# Patient Record
Sex: FEMALE | Race: BLACK OR AFRICAN AMERICAN | NOT HISPANIC OR LATINO
[De-identification: names, ages, dates, MRNs, and addresses within clinical notes are randomized per-mention and may not be internally consistent; named-entity substitution may affect disease eponyms.]

---

## 2017-04-14 ENCOUNTER — APPOINTMENT (OUTPATIENT)
Dept: INTERNAL MEDICINE | Facility: CLINIC | Age: 42
End: 2017-04-14

## 2017-07-25 ENCOUNTER — APPOINTMENT (OUTPATIENT)
Dept: INTERNAL MEDICINE | Facility: CLINIC | Age: 42
End: 2017-07-25

## 2017-08-14 ENCOUNTER — APPOINTMENT (OUTPATIENT)
Dept: INTERNAL MEDICINE | Facility: CLINIC | Age: 42
End: 2017-08-14
Payer: MEDICAID

## 2017-08-14 PROCEDURE — 86580 TB INTRADERMAL TEST: CPT

## 2018-02-26 ENCOUNTER — APPOINTMENT (OUTPATIENT)
Dept: INTERNAL MEDICINE | Facility: CLINIC | Age: 43
End: 2018-02-26
Payer: MEDICAID

## 2018-02-26 PROCEDURE — 99213 OFFICE O/P EST LOW 20 MIN: CPT | Mod: 25

## 2018-02-26 PROCEDURE — 36415 COLL VENOUS BLD VENIPUNCTURE: CPT

## 2018-04-27 ENCOUNTER — APPOINTMENT (OUTPATIENT)
Dept: INTERNAL MEDICINE | Facility: CLINIC | Age: 43
End: 2018-04-27

## 2018-05-10 ENCOUNTER — APPOINTMENT (OUTPATIENT)
Dept: INTERNAL MEDICINE | Facility: CLINIC | Age: 43
End: 2018-05-10
Payer: MEDICAID

## 2018-05-10 PROCEDURE — 99214 OFFICE O/P EST MOD 30 MIN: CPT

## 2018-07-02 ENCOUNTER — APPOINTMENT (OUTPATIENT)
Dept: BARIATRICS | Facility: CLINIC | Age: 43
End: 2018-07-02
Payer: MEDICAID

## 2018-07-02 VITALS
WEIGHT: 242.5 LBS | DIASTOLIC BLOOD PRESSURE: 80 MMHG | HEIGHT: 68 IN | SYSTOLIC BLOOD PRESSURE: 120 MMHG | HEART RATE: 82 BPM | OXYGEN SATURATION: 98 % | BODY MASS INDEX: 36.75 KG/M2

## 2018-07-02 DIAGNOSIS — Z01.818 ENCOUNTER FOR OTHER PREPROCEDURAL EXAMINATION: ICD-10-CM

## 2018-07-02 DIAGNOSIS — Z86.39 PERSONAL HISTORY OF OTHER ENDOCRINE, NUTRITIONAL AND METABOLIC DISEASE: ICD-10-CM

## 2018-07-02 PROCEDURE — 99215 OFFICE O/P EST HI 40 MIN: CPT

## 2018-07-06 ENCOUNTER — RECORD ABSTRACTING (OUTPATIENT)
Age: 43
End: 2018-07-06

## 2018-07-06 DIAGNOSIS — G89.29 PAIN IN RIGHT SHOULDER: ICD-10-CM

## 2018-07-06 DIAGNOSIS — M54.5 LOW BACK PAIN: ICD-10-CM

## 2018-07-06 DIAGNOSIS — M25.511 PAIN IN RIGHT SHOULDER: ICD-10-CM

## 2018-07-06 DIAGNOSIS — M79.605 LOW BACK PAIN: ICD-10-CM

## 2018-07-06 DIAGNOSIS — Z82.49 FAMILY HISTORY OF ISCHEMIC HEART DISEASE AND OTHER DISEASES OF THE CIRCULATORY SYSTEM: ICD-10-CM

## 2018-07-10 ENCOUNTER — APPOINTMENT (OUTPATIENT)
Dept: INTERNAL MEDICINE | Facility: CLINIC | Age: 43
End: 2018-07-10
Payer: MEDICAID

## 2018-07-10 VITALS
DIASTOLIC BLOOD PRESSURE: 78 MMHG | HEIGHT: 68 IN | SYSTOLIC BLOOD PRESSURE: 120 MMHG | BODY MASS INDEX: 36.75 KG/M2 | WEIGHT: 242.5 LBS
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
## 2018-07-10 DIAGNOSIS — S93.601A UNSPECIFIED SPRAIN OF RIGHT FOOT, INITIAL ENCOUNTER: ICD-10-CM

## 2018-07-10 DIAGNOSIS — Z92.89 PERSONAL HISTORY OF OTHER MEDICAL TREATMENT: ICD-10-CM

## 2018-07-10 PROCEDURE — 99214 OFFICE O/P EST MOD 30 MIN: CPT

## 2018-07-10 RX ORDER — BENAZEPRIL HYDROCHLORIDE AND HYDROCHLOROTHIAZIDE 20; 12.5 MG/1; MG/1
20-12.5 TABLET, FILM COATED ORAL DAILY
Refills: 0 | Status: DISCONTINUED | COMMUNITY
End: 2018-07-10

## 2018-07-10 RX ORDER — FLUCONAZOLE 100 MG/1
100 TABLET ORAL
Qty: 5 | Refills: 0 | Status: DISCONTINUED | COMMUNITY
Start: 2018-03-23

## 2018-07-10 RX ORDER — BENZONATATE 200 MG/1
200 CAPSULE ORAL
Qty: 20 | Refills: 0 | Status: DISCONTINUED | COMMUNITY
Start: 2018-05-29

## 2018-07-10 RX ORDER — AZITHROMYCIN 250 MG/1
250 TABLET, FILM COATED ORAL
Qty: 6 | Refills: 0 | Status: DISCONTINUED | COMMUNITY
Start: 2018-05-29

## 2018-07-10 NOTE — HISTORY OF PRESENT ILLNESS
[FreeTextEntry1] : Routine follow up  [de-identified] : 43-year-old woman with hypertension is here for followup. She is complaining of a small mass on right shin.No pain\par Blood pressure is normal. She used to be on Benzapril hydrochlorothiazide but stopped taking Benzapril due to numbness in hands and legs.Since stopping medication feels much better.instead she increased her hydrochlorothiazide to one and a half tablet every day.

## 2018-07-10 NOTE — PLAN
[FreeTextEntry1] : For hypertension Will continue hydrochlorothiazide 37.5 mg. diets and weight loss discussed.\par CBC from February 2018 reviewed with her normal anemia.\par For lipoma will refer her to surgery.

## 2018-07-10 NOTE — PHYSICAL EXAM

## 2018-07-23 PROBLEM — S93.601A RIGHT FOOT SPRAIN: Status: RESOLVED | Noted: 2018-07-06 | Resolved: 2018-07-23
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
## 2018-07-23 PROBLEM — Z92.89 HISTORY OF EKG: Status: RESOLVED | Noted: 2018-07-23 | Resolved: 2018-07-23

## 2018-07-31 ENCOUNTER — APPOINTMENT (OUTPATIENT)
Dept: INTERNAL MEDICINE | Facility: CLINIC | Age: 43
End: 2018-07-31
Payer: MEDICAID

## 2018-07-31 VITALS
SYSTOLIC BLOOD PRESSURE: 120 MMHG | DIASTOLIC BLOOD PRESSURE: 80 MMHG | WEIGHT: 244 LBS | HEIGHT: 68 IN | BODY MASS INDEX: 36.98 KG/M2

## 2018-07-31 PROCEDURE — 99213 OFFICE O/P EST LOW 20 MIN: CPT

## 2018-07-31 RX ORDER — BENAZEPRIL HYDROCHLORIDE AND HYDROCHLOROTHIAZIDE 20; 12.5 MG/1; MG/1
20-12.5 TABLET, FILM COATED ORAL DAILY
Refills: 0 | Status: DISCONTINUED | COMMUNITY
End: 2018-07-31

## 2018-07-31 NOTE — HISTORY OF PRESENT ILLNESS
[No Pertinent Cardiac History] : no history of aortic stenosis, atrial fibrillation, coronary artery disease, recent myocardial infarction, or implantable device/pacemaker [No Pertinent Pulmonary History] : no history of asthma, COPD, sleep apnea, or smoking [No Adverse Anesthesia Reaction] : no adverse anesthesia reaction in self or family member [Excellent (>10 METs)] : Excellent (>10 METs) [Chronic Anticoagulation] : no chronic anticoagulation [Chronic Kidney Disease] : no chronic kidney disease [Diabetes] : no diabetes [FreeTextEntry1] : removal of mass in right lower leg [FreeTextEntry2] : 08/03/2020 [FreeTextEntry3] : dr finkelstein [FreeTextEntry4] : Pre-op [FreeTextEntry6] : No chest pain, No SOB, good exercise tolerance  [FreeTextEntry7] : None needed

## 2018-07-31 NOTE — ASSESSMENT
[Patient Optimized for Surgery] : Patient optimized for surgery [No Further Testing Recommended] : no further testing recommended [Continue medications as is] : Continue current medications [As per surgery] : as per surgery [FreeTextEntry4] : She is in her optimized condition for the planned surgery. HTN is well controlled. for Low K will start supplements and high K containing food.  [FreeTextEntry2] : early ambulation recommended.

## 2018-07-31 NOTE — PLAN
[FreeTextEntry1] : -Will continue Meds Norvasc and HCTZ current doses. \par -Kcl 20meq daily \par -no ASA or NSIADs from today until after surgery. \par -

## 2018-07-31 NOTE — PHYSICAL EXAM

## 2018-08-13 ENCOUNTER — APPOINTMENT (OUTPATIENT)
Dept: INTERNAL MEDICINE | Facility: CLINIC | Age: 43
End: 2018-08-13
Payer: MEDICAID

## 2018-08-13 VITALS
HEIGHT: 66 IN | WEIGHT: 241 LBS | BODY MASS INDEX: 38.73 KG/M2 | SYSTOLIC BLOOD PRESSURE: 120 MMHG | DIASTOLIC BLOOD PRESSURE: 80 MMHG

## 2018-08-13 DIAGNOSIS — Z02.1 ENCOUNTER FOR PRE-EMPLOYMENT EXAMINATION: ICD-10-CM

## 2018-08-13 PROCEDURE — 99211 OFF/OP EST MAY X REQ PHY/QHP: CPT

## 2018-08-13 PROCEDURE — 86580 TB INTRADERMAL TEST: CPT

## 2018-08-15 ENCOUNTER — APPOINTMENT (OUTPATIENT)
Dept: INTERNAL MEDICINE | Facility: CLINIC | Age: 43
End: 2018-08-15
Payer: MEDICAID

## 2018-08-15 VITALS
BODY MASS INDEX: 38.41 KG/M2 | WEIGHT: 239 LBS | HEIGHT: 66 IN | DIASTOLIC BLOOD PRESSURE: 75 MMHG | SYSTOLIC BLOOD PRESSURE: 120 MMHG

## 2018-08-15 DIAGNOSIS — Z23 ENCOUNTER FOR IMMUNIZATION: ICD-10-CM

## 2018-08-15 PROCEDURE — 36415 COLL VENOUS BLD VENIPUNCTURE: CPT

## 2018-08-15 PROCEDURE — 90715 TDAP VACCINE 7 YRS/> IM: CPT

## 2018-08-15 PROCEDURE — 90472 IMMUNIZATION ADMIN EACH ADD: CPT

## 2018-08-15 PROCEDURE — 96372 THER/PROPH/DIAG INJ SC/IM: CPT

## 2018-08-15 PROCEDURE — 99396 PREV VISIT EST AGE 40-64: CPT | Mod: 25

## 2018-08-15 PROCEDURE — 90471 IMMUNIZATION ADMIN: CPT

## 2018-08-15 NOTE — PHYSICAL EXAM

## 2018-08-15 NOTE — PLAN
[FreeTextEntry1] : Patient is here for her annual exam  patient had PPD PLACED NOW NEG AT 48HRS\par TDAP GIVEN TO PT TODAY\par OVERALL FEELS WELL WILL CHECK LABS \par EKG DONE FOR PROP FOR LIPOMA WHICH I WELL HEALED

## 2018-08-15 NOTE — HISTORY OF PRESENT ILLNESS
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

## 2018-09-17 ENCOUNTER — APPOINTMENT (OUTPATIENT)
Dept: SURGERY | Facility: CLINIC | Age: 43
End: 2018-09-17

## 2018-09-28 ENCOUNTER — APPOINTMENT (OUTPATIENT)
Dept: INTERNAL MEDICINE | Facility: CLINIC | Age: 43
End: 2018-09-28
Payer: MEDICAID

## 2018-09-28 VITALS
SYSTOLIC BLOOD PRESSURE: 130 MMHG | DIASTOLIC BLOOD PRESSURE: 70 MMHG | BODY MASS INDEX: 38.41 KG/M2 | WEIGHT: 239 LBS | HEIGHT: 66 IN

## 2018-09-28 DIAGNOSIS — E87.6 HYPOKALEMIA: ICD-10-CM

## 2018-09-28 DIAGNOSIS — A09 INFECTIOUS GASTROENTERITIS AND COLITIS, UNSPECIFIED: ICD-10-CM

## 2018-09-28 DIAGNOSIS — K52.9 NONINFECTIVE GASTROENTERITIS AND COLITIS, UNSPECIFIED: ICD-10-CM

## 2018-09-28 PROCEDURE — 99213 OFFICE O/P EST LOW 20 MIN: CPT

## 2018-09-28 NOTE — PLAN
[FreeTextEntry1] : Patient signs and symptoms examination of her history of recent travel likely indicated patient is having gastroenteritis with possible traveler's diarrhea or possible food poisoning. In discussion with patient no antibiotics return at this time. We'll do stool culture ova parasites. Also the check comprehensive metabolic panel for evaluation of potassium levels and for signs of acute dehydration.\par \par Will discuss results with patient when they return\par \par Counseling included abnormal lab results, differential diagnoses, treatment options, risks and benefits, lifestyle changes, prognosis, current condition, medications, and dose adjustments. \par The patient was interactive, attentive, asked questions, and verbalized understanding

## 2018-09-28 NOTE — PHYSICAL EXAM
[No Acute Distress] : no acute distress [Well Nourished] : well nourished [Well Developed] : well developed [Well-Appearing] : well-appearing [Normal Sclera/Conjunctiva] : normal sclera/conjunctiva [PERRL] : pupils equal round and reactive to light [EOMI] : extraocular movements intact [Normal Outer Ear/Nose] : the outer ears and nose were normal in appearance [Normal Oropharynx] : the oropharynx was normal [No Respiratory Distress] : no respiratory distress  [Clear to Auscultation] : lungs were clear to auscultation bilaterally [No Accessory Muscle Use] : no accessory muscle use [Normal Rate] : normal rate  [Regular Rhythm] : with a regular rhythm [Normal S1, S2] : normal S1 and S2 [No Carotid Bruits] : no carotid bruits [No Abdominal Bruit] : a ~M bruit was not heard ~T in the abdomen [No Varicosities] : no varicosities [Soft] : abdomen soft [Non Tender] : non-tender [Non-distended] : non-distended [No CVA Tenderness] : no CVA  tenderness [No Spinal Tenderness] : no spinal tenderness [de-identified] : hyperactive bowel sounds

## 2018-10-03 ENCOUNTER — APPOINTMENT (OUTPATIENT)
Dept: INTERNAL MEDICINE | Facility: CLINIC | Age: 43
End: 2018-10-03
Payer: MEDICAID

## 2018-10-03 DIAGNOSIS — Z23 ENCOUNTER FOR IMMUNIZATION: ICD-10-CM

## 2018-10-03 PROCEDURE — G0008: CPT

## 2018-10-03 PROCEDURE — 90686 IIV4 VACC NO PRSV 0.5 ML IM: CPT

## 2018-10-03 NOTE — HISTORY OF PRESENT ILLNESS
[FreeTextEntry1] : flu shot [de-identified] : Ms. GLENDY WALKER is a43 year female  who comes to the office for flu vaccine. Patient feels well and has no complaint at this time.

## 2018-11-14 ENCOUNTER — RX RENEWAL (OUTPATIENT)
Age: 43
End: 2018-11-14

## 2019-04-11 ENCOUNTER — NON-APPOINTMENT (OUTPATIENT)
Age: 44
End: 2019-04-11

## 2019-04-11 ENCOUNTER — APPOINTMENT (OUTPATIENT)
Dept: INTERNAL MEDICINE | Facility: CLINIC | Age: 44
End: 2019-04-11
Payer: COMMERCIAL

## 2019-04-11 VITALS
DIASTOLIC BLOOD PRESSURE: 90 MMHG | SYSTOLIC BLOOD PRESSURE: 132 MMHG | HEIGHT: 66 IN | BODY MASS INDEX: 38.41 KG/M2 | WEIGHT: 239 LBS | HEART RATE: 93 BPM

## 2019-04-11 DIAGNOSIS — Z13.29 ENCOUNTER FOR SCREENING FOR OTHER SUSPECTED ENDOCRINE DISORDER: ICD-10-CM

## 2019-04-11 PROCEDURE — 99213 OFFICE O/P EST LOW 20 MIN: CPT | Mod: 25

## 2019-04-11 PROCEDURE — 36415 COLL VENOUS BLD VENIPUNCTURE: CPT

## 2019-04-11 PROCEDURE — G0444 DEPRESSION SCREEN ANNUAL: CPT

## 2019-04-11 PROCEDURE — 93000 ELECTROCARDIOGRAM COMPLETE: CPT

## 2019-04-11 PROCEDURE — G0447 BEHAVIOR COUNSEL OBESITY 15M: CPT

## 2019-04-11 PROCEDURE — 99396 PREV VISIT EST AGE 40-64: CPT | Mod: 25

## 2019-04-11 PROCEDURE — 99497 ADVNCD CARE PLAN 30 MIN: CPT

## 2019-04-11 RX ORDER — POTASSIUM CHLORIDE 1.5 G/1.58G
20 POWDER, FOR SOLUTION ORAL DAILY
Qty: 30 | Refills: 3 | Status: DISCONTINUED | COMMUNITY
Start: 2018-07-31 | End: 2019-04-11

## 2019-04-11 RX ORDER — HYDROCHLOROTHIAZIDE 25 MG/1
25 TABLET ORAL DAILY
Qty: 45 | Refills: 2 | Status: DISCONTINUED | COMMUNITY
Start: 2018-11-14 | End: 2019-04-11

## 2019-04-11 RX ORDER — ONDANSETRON 4 MG/1
4 TABLET ORAL EVERY 6 HOURS
Qty: 30 | Refills: 0 | Status: DISCONTINUED | COMMUNITY
Start: 2018-09-28 | End: 2019-04-11

## 2019-04-11 NOTE — HISTORY OF PRESENT ILLNESS
[FreeTextEntry1] : blood pressure issue [de-identified] : Ms. GLENDY WALKER is a 44 year female with a PMH of HTN comes to the office for physical exam. Patient feels well and has no complaints at this time.

## 2019-04-11 NOTE — COUNSELING
[Weight management counseling provided] : Weight management [Healthy eating counseling provided] : healthy eating [Good understanding] : Patient has a good understanding of disease, goals and obesity follow-up plan [Activity counseling provided] : activity [Low Salt Diet] : Low salt diet [Low Fat Diet] : Low fat diet [___ min/wk activity recommended] : [unfilled] min/wk activity recommended [Walking] : Walking [ - Behavioral Counseling for Obesity (Face-to-Face for 15 Minutes)] : Behavioral Counseling for Obesity (Face-to-Face for 15 Minutes) [ - Annual Depression Screening] : Annual Depression Screening

## 2019-04-11 NOTE — PHYSICAL EXAM
[No Acute Distress] : no acute distress [Well Developed] : well developed [Well Nourished] : well nourished [Normal Sclera/Conjunctiva] : normal sclera/conjunctiva [Well-Appearing] : well-appearing [EOMI] : extraocular movements intact [PERRL] : pupils equal round and reactive to light [Normal Oropharynx] : the oropharynx was normal [Normal Outer Ear/Nose] : the outer ears and nose were normal in appearance [No JVD] : no jugular venous distention [Supple] : supple [No Lymphadenopathy] : no lymphadenopathy [Thyroid Normal, No Nodules] : the thyroid was normal and there were no nodules present [No Respiratory Distress] : no respiratory distress  [Clear to Auscultation] : lungs were clear to auscultation bilaterally [No Accessory Muscle Use] : no accessory muscle use [Normal Rate] : normal rate  [Regular Rhythm] : with a regular rhythm [Normal S1, S2] : normal S1 and S2 [No Murmur] : no murmur heard [No Carotid Bruits] : no carotid bruits [No Abdominal Bruit] : a ~M bruit was not heard ~T in the abdomen [No Varicosities] : no varicosities [Pedal Pulses Present] : the pedal pulses are present [No Edema] : there was no peripheral edema [No Extremity Clubbing/Cyanosis] : no extremity clubbing/cyanosis [Soft] : abdomen soft [No Palpable Aorta] : no palpable aorta [Non Tender] : non-tender [Non-distended] : non-distended [No HSM] : no HSM [No Masses] : no abdominal mass palpated [Normal Bowel Sounds] : normal bowel sounds [Normal Posterior Cervical Nodes] : no posterior cervical lymphadenopathy [Normal Anterior Cervical Nodes] : no anterior cervical lymphadenopathy [No CVA Tenderness] : no CVA  tenderness [No Spinal Tenderness] : no spinal tenderness [No Joint Swelling] : no joint swelling [Grossly Normal Strength/Tone] : grossly normal strength/tone [No Rash] : no rash [Normal Gait] : normal gait [Coordination Grossly Intact] : coordination grossly intact [No Focal Deficits] : no focal deficits [Normal Affect] : the affect was normal [Normal Insight/Judgement] : insight and judgment were intact

## 2019-04-11 NOTE — HEALTH RISK ASSESSMENT
[Good] : ~his/her~  mood as  good [] : No [No falls in past year] : Patient reported no falls in the past year [0] : 2) Feeling down, depressed, or hopeless: Not at all (0) [[id number]] : 0 [Patient declined Low Dose CT Scan] : Patient declined Low Dose CT Scan [Patient declined Retinal Exam] : Patient declined Retinal Exam. [Patient reported mammogram was normal] : Patient reported mammogram was normal [Patient reported PAP Smear was normal] : Patient reported PAP Smear was normal [Patient declined bone density test] : Patient declined bone density test [Patient reported colonoscopy was normal] : Patient reported colonoscopy was normal [HIV test declined] : HIV test declined [Hepatitis C test declined] : Hepatitis C test declined [MammogramDate] : 08/18 [PapSmearDate] : 04/18 [ColonoscopyDate] : 04/13 [Reviewed updated] : Reviewed updated [Designated Healthcare Proxy] : Designated healthcare proxy [Name: ___] : Health Care Proxy's Name: [unfilled]  [Relationship: ___] : Relationship: [unfilled] [Aggressive treatment] : aggressive treatment [I will adhere to the patient's wishes as expressed in the advance directive except as noted below.] : I will adhere to the patient's wishes as expressed in the advance directive except as noted below [AdvancecareDate] : 04/19

## 2019-04-12 LAB
ALBUMIN SERPL ELPH-MCNC: 4.8 G/DL
ALP BLD-CCNC: 66 U/L
ALT SERPL-CCNC: 20 U/L
ANION GAP SERPL CALC-SCNC: 14 MMOL/L
AST SERPL-CCNC: 17 U/L
BASOPHILS # BLD AUTO: 0.05 K/UL
BASOPHILS NFR BLD AUTO: 0.6 %
BILIRUB SERPL-MCNC: 0.6 MG/DL
BUN SERPL-MCNC: 10 MG/DL
CALCIUM SERPL-MCNC: 10.4 MG/DL
CHLORIDE SERPL-SCNC: 100 MMOL/L
CHOLEST SERPL-MCNC: 172 MG/DL
CHOLEST/HDLC SERPL: 3.9 RATIO
CO2 SERPL-SCNC: 26 MMOL/L
CREAT SERPL-MCNC: 0.78 MG/DL
EOSINOPHIL # BLD AUTO: 0.29 K/UL
EOSINOPHIL NFR BLD AUTO: 3.4 %
ESTIMATED AVERAGE GLUCOSE: 82 MG/DL
GLUCOSE SERPL-MCNC: 126 MG/DL
HBA1C MFR BLD HPLC: 4.5 %
HCT VFR BLD CALC: 41.9 %
HDLC SERPL-MCNC: 44 MG/DL
HGB BLD-MCNC: 13.2 G/DL
IMM GRANULOCYTES NFR BLD AUTO: 0.2 %
LDLC SERPL CALC-MCNC: 106 MG/DL
LYMPHOCYTES # BLD AUTO: 2.3 K/UL
LYMPHOCYTES NFR BLD AUTO: 26.7 %
MAN DIFF?: NORMAL
MCHC RBC-ENTMCNC: 31.5 GM/DL
MCHC RBC-ENTMCNC: 32 PG
MCV RBC AUTO: 101.5 FL
MONOCYTES # BLD AUTO: 0.5 K/UL
MONOCYTES NFR BLD AUTO: 5.8 %
NEUTROPHILS # BLD AUTO: 5.47 K/UL
NEUTROPHILS NFR BLD AUTO: 63.3 %
PLATELET # BLD AUTO: 342 K/UL
POTASSIUM SERPL-SCNC: 3.8 MMOL/L
PROT SERPL-MCNC: 7.9 G/DL
RBC # BLD: 4.13 M/UL
RBC # FLD: 13.4 %
SODIUM SERPL-SCNC: 140 MMOL/L
TRIGL SERPL-MCNC: 110 MG/DL
TSH SERPL-ACNC: 1.19 UIU/ML
WBC # FLD AUTO: 8.63 K/UL

## 2019-04-18 ENCOUNTER — OTHER (OUTPATIENT)
Age: 44
End: 2019-04-18

## 2019-04-18 DIAGNOSIS — M25.569 PAIN IN UNSPECIFIED KNEE: ICD-10-CM

## 2019-04-25 ENCOUNTER — APPOINTMENT (OUTPATIENT)
Age: 44
End: 2019-04-25
Payer: COMMERCIAL

## 2019-04-25 VITALS
DIASTOLIC BLOOD PRESSURE: 85 MMHG | HEIGHT: 66 IN | BODY MASS INDEX: 38.41 KG/M2 | HEART RATE: 75 BPM | SYSTOLIC BLOOD PRESSURE: 134 MMHG | WEIGHT: 239 LBS

## 2019-04-25 DIAGNOSIS — M17.12 UNILATERAL PRIMARY OSTEOARTHRITIS, LEFT KNEE: ICD-10-CM

## 2019-04-25 PROCEDURE — S0020: CPT

## 2019-04-25 PROCEDURE — 99214 OFFICE O/P EST MOD 30 MIN: CPT | Mod: 25

## 2019-04-25 PROCEDURE — 20610 DRAIN/INJ JOINT/BURSA W/O US: CPT

## 2019-04-25 PROCEDURE — 73564 X-RAY EXAM KNEE 4 OR MORE: CPT

## 2019-04-25 NOTE — CONSULT LETTER
[Dear  ___] : Dear  [unfilled], [Please see my note below.] : Please see my note below. [Consult Letter:] : I had the pleasure of evaluating your patient, [unfilled]. [Consult Closing:] : Thank you very much for allowing me to participate in the care of this patient.  If you have any questions, please do not hesitate to contact me. [Sincerely,] : Sincerely, [FreeTextEntry2] : CRUZ GALAVIZ MD\par  [FreeTextEntry3] : Filiberto Dillard MD\par Chief of Joint Replacement\par [facility] Knee Replacement \par [facility] [place]\par \par

## 2019-04-25 NOTE — HISTORY OF PRESENT ILLNESS
[de-identified] : The patient is a 44 year old female being seen for evaluation of her left knee. She denies injury or trauma. She reports developing intermittent left knee pain several months ago. Pain is most notable over the patellar region. Pain is worse with activities involving flexion and stair climbing. She denies locking but notes a sensation of giving way. She reports associated swelling, crepitus and limping. She will take ibuprofen but not on a daily basis. She reports being aware of the need to lose weight. She is not doing any formal exercise.

## 2019-04-25 NOTE — PHYSICAL EXAM
[de-identified] : Xray- 4 views of the left knee shows mild to moderate medial compartment and mild patellofemoral compartment arthritis with patella frank of the left knee. [de-identified] : The patient appears well nourished  and in no apparent distress.  The patient is alert and oriented to person, place, and time.   Affect and mood appear normal. The head is normocephalic and atraumatic.  The eyes reveal normal sclera and extra ocular muscles are intact. The tongue is midline with no apparent lesions.  Skin shows normal turgor with no evidence of eczema or psoriasis.  No respiratory distress noted.  Sensation grossly intact.\par   [de-identified] : Exam of the left knee shows no patellofemoral crepitus. Mild medial and anterior knee pain with ROM, 0-120 degrees. 5/5 motor strength bilaterally distally. Sensation intact distally.

## 2019-04-25 NOTE — ADDENDUM
[FreeTextEntry1] : This note was authored by Paxton Perez working as a medical scribe for Dr. Filiberto Dillard. The note was reviewed, edited, and revised by Dr. Filiberto Dillard whom is in agreement with the exam findings, imaging findings, and treatment plan. 04/25/2019.

## 2019-04-25 NOTE — DISCUSSION/SUMMARY
[de-identified] : The patient is a 44 year old female with mild to moderate medial compartment and mild patellofemoral compartment arthritis with patella frank of the left knee. Conservative options were discussed. She was given a cortisone injection in her left knee today. She was given a prescription for Physcial therapy and anti-inflammatories. I recommended a course of Mobic. The patient was given a prescription for the Mobic with directions. She was instructed to stop the medicine and call the office if there are any adverse reaction to the medicine. She was also instructed to consult with their primary care doctor prior to starting the medication. She may follow up as needed.\par

## 2019-04-25 NOTE — PROCEDURE
[de-identified] : Using sterile technique, 2cc of depomedrol 40mg/ml, 4cc of 1% plain lidocaine, and 2 cc 0.25% marcaine was drawn up into a sterile syringe. The left knee was then sterilely prepped with chlorhexidine. Ethyl chloride spray was used to anesthetize the skin and subQ tissue. The depomedrol/lidocaine/marcaine mixture was then injected into the knee joint in the anterolateral position. The patient tolerated the procedure well without difficulty. The patient was given instructions on the use of ice and anti-inflammatories post injection site soreness.\par \par

## 2019-04-25 NOTE — REVIEW OF SYSTEMS
[Nasal Discharge] : nasal discharge [Sore Throat] : sore throat [Joint Pain] : joint pain [Cough] : cough

## 2019-07-03 ENCOUNTER — APPOINTMENT (OUTPATIENT)
Dept: BARIATRICS/WEIGHT MGMT | Facility: CLINIC | Age: 44
End: 2019-07-03

## 2019-07-10 ENCOUNTER — APPOINTMENT (OUTPATIENT)
Dept: BARIATRICS | Facility: CLINIC | Age: 44
End: 2019-07-10
Payer: COMMERCIAL

## 2019-07-10 VITALS
OXYGEN SATURATION: 99 % | BODY MASS INDEX: 38.42 KG/M2 | SYSTOLIC BLOOD PRESSURE: 120 MMHG | HEIGHT: 68 IN | DIASTOLIC BLOOD PRESSURE: 80 MMHG | WEIGHT: 253.53 LBS | HEART RATE: 88 BPM

## 2019-07-10 PROCEDURE — 99215 OFFICE O/P EST HI 40 MIN: CPT

## 2019-07-11 ENCOUNTER — APPOINTMENT (OUTPATIENT)
Dept: BARIATRICS | Facility: CLINIC | Age: 44
End: 2019-07-11

## 2019-07-11 NOTE — HISTORY OF PRESENT ILLNESS
[de-identified] : 44 year old woman with long-standing history of morbid obesity status post Lap Band. Patient has had overall modest weight loss with preoperative weight 255, lowest 223, and currently weighs 242. Patient had Lap Band removed 2014 secondary to dysphagia. Since that time her  symptoms have resolved but she has been unable to lose weight. Patient has multiple obesity related medical problems and realizes that weight loss would improve her  health.  Patient is familiar with the Laparoscopic Adjustable Gastric Band, the Laparoscopic Sleeve Gastrectomy and the Laparoscopic Gastric Bypass. Patient had been seen in the past for evaluation for weight loss surgery however patient was unable to proceed with surgery at that time. Patient now realizes that she will be unable to lose and maintain weight loss with diet and exercise alone and presents today for reevaluation and to discuss options for surgery, specifically the Laparoscopic Sleeve Gastrectomy.   [Procedure: ___] : Procedure performed: [unfilled]  [Date of Surgery: ___] : Date of Surgery:   [unfilled] [Surgeon Name:   ___] : Surgeon Name: Dr. CAPONE [Pre-Op Weight ___] : Pre-op weight was [unfilled] lbs [de-identified] : Laparoscopic removal of Lap Band and port, 2014, Dr. Kelly

## 2019-07-11 NOTE — REASON FOR VISIT
[Follow-Up Visit] : a follow-up visit for [Morbid Obesity (BMI<40)] : morbid obesity (bmi<40) [Family Member] : family member

## 2019-07-11 NOTE — REVIEW OF SYSTEMS
[Fatigue] : fatigue [Recent Change In Weight] : ~T recent weight change [Negative] : Allergic/Immunologic [de-identified] : numbness/tingling

## 2019-07-11 NOTE — ASSESSMENT
[FreeTextEntry1] : 44 year old woman  with long-standing history of morbid obesity status post Lap Band. Patient had overall modest weight loss. Lap Band was removed in 2014 for dysphagia. Patient feels that she will be unable to lose and maintain weight loss on her  own and presents today for re-evaluation for further weight loss surgery.  \par \par I had an extensive discussion with the patient reviewing the Laparoscopic Adjustable Gastric Band, Laparoscopic Sleeve Gastrectomy, and Laparoscopic Gastric Bypass.   Diagrams were used.  All questions were answered.\par \par Complications were discussed including but not limited to: dumping, vitamin and protein deficiencies, pneumonia, urinary infection, wound infection, leaks/peritonitis possibly requiring intraabdominal drains or reoperation, bleeding, DVT, pulmonary embolus, abdominal wall hernias, internal hernias,  revisions, death, inadequate weight loss.  Increased risk of revisional surgery was also discussed. The importance of vitamins and protein was stressed, as was the importance of follow-up.  \par \par Patient understands she needs to make significant dietary and lifestyle changes in order to lose and maintain weight loss in addition to further weight loss surgery.\par \par Patient feels that her weight is adversely affecting her health and that she will be unable to lose and maintain weight loss on her  own and is now interested in pursuing the Laparoscopic Sleeve Gastrectomy. Patient understands that if intraoperative conditions are unfavorable Laparoscopic Gastric Bypass may be more appropriate and that there is a small chance that she  would not have any weight loss procedure. Patient understands that there is a higher risk of complications with revisional surgery and wishes to proceed.  \par \par She  was given written material to review. Pre-operative evaluations were reviewed. She will need to lose weight prior to surgery and will be seen again prior to surgery. Once her pre-operative evaluations are completed She will be schedule for surgery.  She was told to call with any questions.

## 2019-08-07 ENCOUNTER — APPOINTMENT (OUTPATIENT)
Age: 44
End: 2019-08-07
Payer: COMMERCIAL

## 2019-08-07 VITALS
RESPIRATION RATE: 16 BRPM | BODY MASS INDEX: 37.89 KG/M2 | DIASTOLIC BLOOD PRESSURE: 86 MMHG | WEIGHT: 250 LBS | HEART RATE: 75 BPM | HEIGHT: 68 IN | SYSTOLIC BLOOD PRESSURE: 126 MMHG

## 2019-08-07 PROCEDURE — 99214 OFFICE O/P EST MOD 30 MIN: CPT | Mod: 25

## 2019-08-07 PROCEDURE — 86580 TB INTRADERMAL TEST: CPT

## 2019-08-07 RX ORDER — HYDROCHLOROTHIAZIDE 25 MG/1
25 TABLET ORAL DAILY
Qty: 45 | Refills: 2 | Status: DISCONTINUED | COMMUNITY
End: 2019-08-07

## 2019-08-07 NOTE — PLAN
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

## 2019-08-09 ENCOUNTER — APPOINTMENT (OUTPATIENT)
Dept: BARIATRICS | Facility: CLINIC | Age: 44
End: 2019-08-09
Payer: COMMERCIAL

## 2019-08-09 VITALS
HEART RATE: 78 BPM | SYSTOLIC BLOOD PRESSURE: 124 MMHG | BODY MASS INDEX: 38.06 KG/M2 | DIASTOLIC BLOOD PRESSURE: 80 MMHG | WEIGHT: 251.1 LBS | HEIGHT: 68 IN | OXYGEN SATURATION: 98 %

## 2019-08-09 PROCEDURE — 99214 OFFICE O/P EST MOD 30 MIN: CPT

## 2019-08-09 NOTE — REASON FOR VISIT
[Follow-Up Visit] : a follow-up visit for [S/P Bariatric Surgery] : s/p bariatric surgery [Morbid Obesity (BMI<40)] : morbid obesity (bmi<40)

## 2019-08-19 NOTE — ASSESSMENT
[FreeTextEntry1] : 44 year old F undergoing workup for laparoscopic sleeve gastrectomy here for WEIGH IN VISIT. Weight loss since last visit.\par \par Pre op nutrition classes scheduled.\par Nutrition 9/27/19  Psych - 9/17 \par 4 additional weigh in visits. ( September, October, November, December)\par Plan to scheduled cardiac/ pulmonary / GI consults and testing. \par \par Nutritional counseling has been provided. The patient is encouraged to remain calorie conscious and continue a low fat, low carbohydrate, protein focus diet. Pt encouraged to participate in a daily exercise regimen incorporating cardio and strength training. \par \par Return to office in 4 weeks or earlier if any concerns. \par \par

## 2019-08-19 NOTE — PHYSICAL EXAM
[Obese, well nourished, in no acute distress] : obese, well nourished, in no acute distress [Normal] : affect appropriate [de-identified] : normoactive bowel sounds, soft and non tender, no hepatosplenomegaly or masses appreciated.

## 2019-08-19 NOTE — REVIEW OF SYSTEMS
[Recent Change In Weight] : ~T recent weight change [Negative] : Endocrine [Fever] : no fever [Chills] : no chills [Dysphagia] : no dysphagia [Chest Pain] : no chest pain [Lower Ext Edema] : no lower extremity edema [Palpitations] : no palpitations [Shortness Of Breath] : no shortness of breath [Wheezing] : no wheezing [Cough] : no cough [SOB on Exertion] : no shortness of breath during exertion [Abdominal Pain] : no abdominal pain [Vomiting] : no vomiting [Constipation] : no constipation [Diarrhea] : no diarrhea [Reflux/Heartburn] : no reflux/ heartburn [FreeTextEntry2] : weight loss

## 2019-08-19 NOTE — HISTORY OF PRESENT ILLNESS
[Procedure: ___] : Procedure performed: [unfilled]  [Date of Surgery: ___] : Date of Surgery:   [unfilled] [Surgeon Name:   ___] : Surgeon Name: Dr. CAPONE [Pre-Op Weight ___] : Pre-op weight was [unfilled] lbs [de-identified] : 44 year old F undergoing workup for laparoscopic sleeve gastrectomy here for WEIGH IN VISIT. Weight loss since last visit.  Patient is currently in the process of completing her workup as well as a medically supervised diet. Patient continues to make efforts to improve food choices and increased activity.Pt is following a protein focused diet - 3 meals a day - consuming a sufficient amount of zero calorie liquid.  Patient knows she needs to lose weight prior to surgery.Exercising regularly as recommended.  All questions were answered.Discussed and reviewed further requirements needed prior to scheduling surgery. \par   [de-identified] : Laparoscopic removal of Lap Band and port, 2014, Dr. Kelly

## 2019-09-10 ENCOUNTER — APPOINTMENT (OUTPATIENT)
Dept: BARIATRICS | Facility: CLINIC | Age: 44
End: 2019-09-10
Payer: COMMERCIAL

## 2019-09-10 VITALS
SYSTOLIC BLOOD PRESSURE: 130 MMHG | WEIGHT: 252.65 LBS | OXYGEN SATURATION: 99 % | BODY MASS INDEX: 38.29 KG/M2 | DIASTOLIC BLOOD PRESSURE: 80 MMHG | HEIGHT: 68 IN | HEART RATE: 70 BPM

## 2019-09-10 PROCEDURE — 99214 OFFICE O/P EST MOD 30 MIN: CPT

## 2019-09-11 NOTE — ASSESSMENT
[FreeTextEntry1] : 44 year old F undergoing workup for laparoscopic sleeve gastrectomy here for WEIGH IN VISIT. Weight loss since last visit.\par \par Pre op nutrition classes scheduled.\par Nutrition 9/27/19  Psych - 9/17 scheduled. \par 3 additional weigh in visits. ( October, November, December)\par Plan to scheduled cardiac/ pulmonary / GI consults and testing. \par \par Nutritional counseling has been provided. The patient is encouraged to remain calorie conscious and continue a low fat, low carbohydrate, protein focus diet. Pt encouraged to participate in a daily exercise regimen incorporating cardio and strength training. \par \par Return to office in 4 weeks for next weigh in visit. \par \par

## 2019-09-11 NOTE — REVIEW OF SYSTEMS
[Recent Change In Weight] : ~T recent weight change [Negative] : Endocrine [Fever] : no fever [Chills] : no chills [Dysphagia] : no dysphagia [Chest Pain] : no chest pain [Lower Ext Edema] : no lower extremity edema [Palpitations] : no palpitations [Wheezing] : no wheezing [Shortness Of Breath] : no shortness of breath [SOB on Exertion] : no shortness of breath during exertion [Cough] : no cough [Vomiting] : no vomiting [Abdominal Pain] : no abdominal pain [Constipation] : no constipation [Diarrhea] : no diarrhea [Reflux/Heartburn] : no reflux/ heartburn [FreeTextEntry2] : weight stable

## 2019-09-11 NOTE — PHYSICAL EXAM
[Obese, well nourished, in no acute distress] : obese, well nourished, in no acute distress [Normal] : affect appropriate [de-identified] : normoactive bowel sounds, soft and non tender, no hepatosplenomegaly or masses appreciated.

## 2019-09-11 NOTE — HISTORY OF PRESENT ILLNESS
[Procedure: ___] : Procedure performed: [unfilled]  [Date of Surgery: ___] : Date of Surgery:   [unfilled] [Surgeon Name:   ___] : Surgeon Name: Dr. CAPONE [Pre-Op Weight ___] : Pre-op weight was [unfilled] lbs [de-identified] : 44 year old F undergoing workup for laparoscopic sleeve gastrectomy here for WEIGH IN VISIT. Weight stable since last visit. Patient is currently in the process of completing her workup as well as a medically supervised diet. Patient continues to make efforts to improve food choices and increased activity.Pt is following a protein focused diet - 3 meals a day - consuming a sufficient amount of zero calorie liquid.  Patient knows she needs to lose weight prior to surgery.Exercising regularly as recommended.  All questions were answered.Discussed and reviewed further requirements needed prior to scheduling surgery. \par   [de-identified] : Laparoscopic removal of Lap Band and port, 2014, Dr. Kelly

## 2019-09-26 ENCOUNTER — APPOINTMENT (OUTPATIENT)
Dept: INTERNAL MEDICINE | Facility: CLINIC | Age: 44
End: 2019-09-26
Payer: COMMERCIAL

## 2019-09-26 DIAGNOSIS — Z23 ENCOUNTER FOR IMMUNIZATION: ICD-10-CM

## 2019-09-26 PROCEDURE — 86580 TB INTRADERMAL TEST: CPT

## 2019-09-26 PROCEDURE — 90471 IMMUNIZATION ADMIN: CPT

## 2019-09-26 PROCEDURE — 90686 IIV4 VACC NO PRSV 0.5 ML IM: CPT

## 2019-09-27 ENCOUNTER — APPOINTMENT (OUTPATIENT)
Dept: BARIATRICS/WEIGHT MGMT | Facility: CLINIC | Age: 44
End: 2019-09-27

## 2019-09-27 ENCOUNTER — APPOINTMENT (OUTPATIENT)
Dept: BARIATRICS/WEIGHT MGMT | Facility: CLINIC | Age: 44
End: 2019-09-27
Payer: COMMERCIAL

## 2019-09-27 VITALS — WEIGHT: 252.2 LBS | HEIGHT: 68 IN | BODY MASS INDEX: 38.22 KG/M2

## 2019-09-27 PROCEDURE — 97802 MEDICAL NUTRITION INDIV IN: CPT

## 2019-10-10 ENCOUNTER — APPOINTMENT (OUTPATIENT)
Dept: BARIATRICS | Facility: CLINIC | Age: 44
End: 2019-10-10

## 2019-10-10 ENCOUNTER — APPOINTMENT (OUTPATIENT)
Dept: BARIATRICS/WEIGHT MGMT | Facility: CLINIC | Age: 44
End: 2019-10-10

## 2019-11-08 ENCOUNTER — APPOINTMENT (OUTPATIENT)
Dept: BARIATRICS | Facility: CLINIC | Age: 44
End: 2019-11-08

## 2019-12-02 ENCOUNTER — APPOINTMENT (OUTPATIENT)
Dept: BARIATRICS/WEIGHT MGMT | Facility: CLINIC | Age: 44
End: 2019-12-02

## 2019-12-10 ENCOUNTER — APPOINTMENT (OUTPATIENT)
Dept: BARIATRICS | Facility: CLINIC | Age: 44
End: 2019-12-10

## 2020-01-13 ENCOUNTER — RX RENEWAL (OUTPATIENT)
Age: 45
End: 2020-01-13

## 2020-03-17 ENCOUNTER — INPATIENT (INPATIENT)
Facility: HOSPITAL | Age: 45
LOS: 4 days | Discharge: HOME | DRG: 98 | End: 2020-03-22
Attending: HOSPITALIST | Admitting: HOSPITALIST
Payer: MEDICAID

## 2020-03-17 VITALS
DIASTOLIC BLOOD PRESSURE: 84 MMHG | RESPIRATION RATE: 16 BRPM | OXYGEN SATURATION: 100 % | HEIGHT: 69 IN | HEART RATE: 84 BPM | SYSTOLIC BLOOD PRESSURE: 135 MMHG | WEIGHT: 250 LBS | TEMPERATURE: 98 F

## 2020-03-17 DIAGNOSIS — Z98.89 OTHER SPECIFIED POSTPROCEDURAL STATES: Chronic | ICD-10-CM

## 2020-03-17 DIAGNOSIS — I63.9 CEREBRAL INFARCTION, UNSPECIFIED: ICD-10-CM

## 2020-03-17 DIAGNOSIS — I63.81 OTHER CEREBRAL INFARCTION DUE TO OCCLUSION OR STENOSIS OF SMALL ARTERY: ICD-10-CM

## 2020-03-17 DIAGNOSIS — I10 ESSENTIAL (PRIMARY) HYPERTENSION: ICD-10-CM

## 2020-03-17 LAB
ALBUMIN SERPL ELPH-MCNC: 4.1 G/DL — SIGNIFICANT CHANGE UP (ref 3.3–5.2)
ALP SERPL-CCNC: 64 U/L — SIGNIFICANT CHANGE UP (ref 40–120)
ALT FLD-CCNC: 20 U/L — SIGNIFICANT CHANGE UP
ANION GAP SERPL CALC-SCNC: 11 MMOL/L — SIGNIFICANT CHANGE UP (ref 5–17)
APPEARANCE UR: CLEAR — SIGNIFICANT CHANGE UP
APTT BLD: 32.9 SEC — SIGNIFICANT CHANGE UP (ref 27.5–36.3)
AST SERPL-CCNC: 19 U/L — SIGNIFICANT CHANGE UP
BASOPHILS # BLD AUTO: 0.04 K/UL — SIGNIFICANT CHANGE UP (ref 0–0.2)
BASOPHILS NFR BLD AUTO: 0.5 % — SIGNIFICANT CHANGE UP (ref 0–2)
BILIRUB SERPL-MCNC: 0.5 MG/DL — SIGNIFICANT CHANGE UP (ref 0.4–2)
BILIRUB UR-MCNC: NEGATIVE — SIGNIFICANT CHANGE UP
BUN SERPL-MCNC: 11 MG/DL — SIGNIFICANT CHANGE UP (ref 8–20)
CALCIUM SERPL-MCNC: 9.4 MG/DL — SIGNIFICANT CHANGE UP (ref 8.6–10.2)
CHLORIDE SERPL-SCNC: 101 MMOL/L — SIGNIFICANT CHANGE UP (ref 98–107)
CO2 SERPL-SCNC: 26 MMOL/L — SIGNIFICANT CHANGE UP (ref 22–29)
COLOR SPEC: YELLOW — SIGNIFICANT CHANGE UP
CREAT SERPL-MCNC: 0.62 MG/DL — SIGNIFICANT CHANGE UP (ref 0.5–1.3)
DIFF PNL FLD: NEGATIVE — SIGNIFICANT CHANGE UP
EOSINOPHIL # BLD AUTO: 0.26 K/UL — SIGNIFICANT CHANGE UP (ref 0–0.5)
EOSINOPHIL NFR BLD AUTO: 3 % — SIGNIFICANT CHANGE UP (ref 0–6)
GLUCOSE SERPL-MCNC: 91 MG/DL — SIGNIFICANT CHANGE UP (ref 70–99)
GLUCOSE UR QL: NEGATIVE MG/DL — SIGNIFICANT CHANGE UP
HCG SERPL-ACNC: <4 MIU/ML — SIGNIFICANT CHANGE UP
HCT VFR BLD CALC: 36.8 % — SIGNIFICANT CHANGE UP (ref 34.5–45)
HGB BLD-MCNC: 12 G/DL — SIGNIFICANT CHANGE UP (ref 11.5–15.5)
IMM GRANULOCYTES NFR BLD AUTO: 0.3 % — SIGNIFICANT CHANGE UP (ref 0–1.5)
INR BLD: 0.98 RATIO — SIGNIFICANT CHANGE UP (ref 0.88–1.16)
KETONES UR-MCNC: NEGATIVE — SIGNIFICANT CHANGE UP
LEUKOCYTE ESTERASE UR-ACNC: NEGATIVE — SIGNIFICANT CHANGE UP
LYMPHOCYTES # BLD AUTO: 2 K/UL — SIGNIFICANT CHANGE UP (ref 1–3.3)
LYMPHOCYTES # BLD AUTO: 23.1 % — SIGNIFICANT CHANGE UP (ref 13–44)
MCHC RBC-ENTMCNC: 32.2 PG — SIGNIFICANT CHANGE UP (ref 27–34)
MCHC RBC-ENTMCNC: 32.6 GM/DL — SIGNIFICANT CHANGE UP (ref 32–36)
MCV RBC AUTO: 98.7 FL — SIGNIFICANT CHANGE UP (ref 80–100)
MONOCYTES # BLD AUTO: 0.7 K/UL — SIGNIFICANT CHANGE UP (ref 0–0.9)
MONOCYTES NFR BLD AUTO: 8.1 % — SIGNIFICANT CHANGE UP (ref 2–14)
NEUTROPHILS # BLD AUTO: 5.64 K/UL — SIGNIFICANT CHANGE UP (ref 1.8–7.4)
NEUTROPHILS NFR BLD AUTO: 65 % — SIGNIFICANT CHANGE UP (ref 43–77)
NITRITE UR-MCNC: NEGATIVE — SIGNIFICANT CHANGE UP
PH UR: 6.5 — SIGNIFICANT CHANGE UP (ref 5–8)
PLATELET # BLD AUTO: 308 K/UL — SIGNIFICANT CHANGE UP (ref 150–400)
POTASSIUM SERPL-MCNC: 3.6 MMOL/L — SIGNIFICANT CHANGE UP (ref 3.5–5.3)
POTASSIUM SERPL-SCNC: 3.6 MMOL/L — SIGNIFICANT CHANGE UP (ref 3.5–5.3)
PROT SERPL-MCNC: 7.3 G/DL — SIGNIFICANT CHANGE UP (ref 6.6–8.7)
PROT UR-MCNC: NEGATIVE MG/DL — SIGNIFICANT CHANGE UP
PROTHROM AB SERPL-ACNC: 11.1 SEC — SIGNIFICANT CHANGE UP (ref 10–12.9)
RBC # BLD: 3.73 M/UL — LOW (ref 3.8–5.2)
RBC # FLD: 12.8 % — SIGNIFICANT CHANGE UP (ref 10.3–14.5)
SODIUM SERPL-SCNC: 138 MMOL/L — SIGNIFICANT CHANGE UP (ref 135–145)
SP GR SPEC: 1.01 — SIGNIFICANT CHANGE UP (ref 1.01–1.02)
TROPONIN T SERPL-MCNC: <0.01 NG/ML — SIGNIFICANT CHANGE UP (ref 0–0.06)
UROBILINOGEN FLD QL: NEGATIVE MG/DL — SIGNIFICANT CHANGE UP
WBC # BLD: 8.67 K/UL — SIGNIFICANT CHANGE UP (ref 3.8–10.5)
WBC # FLD AUTO: 8.67 K/UL — SIGNIFICANT CHANGE UP (ref 3.8–10.5)

## 2020-03-17 PROCEDURE — 62328 DX LMBR SPI PNXR W/FLUOR/CT: CPT

## 2020-03-17 PROCEDURE — 70551 MRI BRAIN STEM W/O DYE: CPT | Mod: 26

## 2020-03-17 PROCEDURE — 99284 EMERGENCY DEPT VISIT MOD MDM: CPT

## 2020-03-17 PROCEDURE — 99223 1ST HOSP IP/OBS HIGH 75: CPT

## 2020-03-17 PROCEDURE — 70450 CT HEAD/BRAIN W/O DYE: CPT | Mod: 26,59

## 2020-03-17 PROCEDURE — 70498 CT ANGIOGRAPHY NECK: CPT | Mod: 26

## 2020-03-17 PROCEDURE — 93010 ELECTROCARDIOGRAM REPORT: CPT

## 2020-03-17 PROCEDURE — 70496 CT ANGIOGRAPHY HEAD: CPT | Mod: 26

## 2020-03-17 RX ORDER — ASPIRIN/CALCIUM CARB/MAGNESIUM 324 MG
81 TABLET ORAL DAILY
Refills: 0 | Status: DISCONTINUED | OUTPATIENT
Start: 2020-03-18 | End: 2020-03-18

## 2020-03-17 RX ORDER — ASPIRIN/CALCIUM CARB/MAGNESIUM 324 MG
325 TABLET ORAL ONCE
Refills: 0 | Status: COMPLETED | OUTPATIENT
Start: 2020-03-17 | End: 2020-03-17

## 2020-03-17 RX ORDER — METOPROLOL TARTRATE 50 MG
0 TABLET ORAL
Qty: 0 | Refills: 0 | DISCHARGE

## 2020-03-17 RX ORDER — ATORVASTATIN CALCIUM 80 MG/1
20 TABLET, FILM COATED ORAL AT BEDTIME
Refills: 0 | Status: DISCONTINUED | OUTPATIENT
Start: 2020-03-17 | End: 2020-03-22

## 2020-03-17 RX ORDER — ENOXAPARIN SODIUM 100 MG/ML
40 INJECTION SUBCUTANEOUS DAILY
Refills: 0 | Status: DISCONTINUED | OUTPATIENT
Start: 2020-03-18 | End: 2020-03-18

## 2020-03-17 RX ADMIN — Medication 325 MILLIGRAM(S): at 18:43

## 2020-03-17 NOTE — ED ADULT TRIAGE NOTE - CHIEF COMPLAINT QUOTE
45 F, NAD, c/o right facial numbness and right arm weakness and numbness onset upon waking up this morning. Pt last known well at 0100 when went to bed. JANIE patterson.

## 2020-03-17 NOTE — H&P ADULT - HISTORY OF PRESENT ILLNESS
46 y/o female with history of HTN which is reported to be well controlled was in her normal state of health when she went to bed last night. She woke up this morning normal until around 8am she noted she had trouble walking due to right leg numbness and weakness. She also noted her right arm was weak and she had numbness on the right side of her face. She went back to bed and then around 10am woke up again with symptoms persisting. She thought she would give it more time to go away but when it didnt she came to ED. In ED patient was outside of TPA window, CTA with no LVO, or ICH. She had NIH of 3 and still has persistence of symptoms. She denies any palpitations, travel, fever, sick contacts, N/V.

## 2020-03-17 NOTE — H&P ADULT - NSICDXPASTSURGICALHX_GEN_ALL_CORE_FT
PAST SURGICAL HISTORY:  S/P      S/P gastric surgery lap band placement ; removed in     S/P tonsillectomy 2008

## 2020-03-17 NOTE — ED ADULT NURSE NOTE - NSIMPLEMENTINTERV_GEN_ALL_ED
Implemented All Universal Safety Interventions:  [place] to call system. Call bell, personal items and telephone within reach. Instruct patient to call for assistance. Room bathroom lighting operational. Non-slip footwear when patient is off stretcher. Physically safe environment: no spills, clutter or unnecessary equipment. Stretcher in lowest position, wheels locked, appropriate side rails in place.

## 2020-03-17 NOTE — ED PROVIDER NOTE - CLINICAL SUMMARY MEDICAL DECISION MAKING FREE TEXT BOX
45yoF with HTN pw minor stroke symptoms outside of TPA window;  out of window for " stroke code" per [facility] policy, however will obtain CTh followed by CTA to r/o large vessel occlusion, admit for neurology/ further management.

## 2020-03-17 NOTE — H&P ADULT - MOTOR
left side 5/5  right side: RUE 3/5  right LE 4/5  decreased sensation to light touch over right arm/leg

## 2020-03-17 NOTE — ED ADULT NURSE NOTE - OBJECTIVE STATEMENT
Patient with Patient with right upper and lower extremity drift and decreased sensation on right arm which she noticed when she woke up this morning.  Patient is normal sinus rhythm on cardiac monitor, pupils are reactive, breathing is unlabored.

## 2020-03-17 NOTE — ED ADULT NURSE REASSESSMENT NOTE - NS ED NURSE REASSESS COMMENT FT1
Assumed pt care at this time. Pt resting comfortably in stretcher, A&Ox3, NAD noted, respirations even and nonlabored, NSR on monitor. Pt offers no complaints at this time. Pt educated on plan of care, plan of care taught back to RN. Plan of care education deemed proficient through successful teach back. Will continue to reeducate pt regarding plan of care.

## 2020-03-17 NOTE — ED PROVIDER NOTE - OBJECTIVE STATEMENT
45yoF with h/o HTN, on norvasc, pw numbness/ tingling to R face, arm, as well as weakness to the R side and heaviness to the arm she noticed when she woke up at 8 this morning. Denies prior symptoms. No headache/ visual changes, slurred speech, dizziness. Pt not on aspirin.

## 2020-03-17 NOTE — H&P ADULT - NSICDXFAMILYHX_GEN_ALL_CORE_FT
FAMILY HISTORY:  Family history of CVA, grandfather age 65    Sibling  Still living? Yes, Estimated age: 41-50  Family history of hypertension, Age at diagnosis: Age Unknown

## 2020-03-17 NOTE — ED PROVIDER NOTE - CROS ED CARDIOVAS ALL NEG
Telephone Encounter by Leidy Watts at 06/06/18 12:41 PM     Author:  Leidy Watts Service:  (none) Author Type:  Patient      Filed:  06/06/18 12:43 PM Encounter Date:  2018 Status:  Signed     :  Leidy Watts (Patient )              NAHOMI POTTER    Patient Age: 4 month old    ACCT STATUS:   MESSAGE:[TB1.1T]   Patient's mother is calling and states that she has been crying for two days, she thinks that she may be teething, please advise mother would like to know what she should do.[TB1.1M]   Next and Last Visit with Provider and Department  Next visit with SETHDIMPLE GOFF is on 2018 at 10:50 AM in [facility]  Next visit with PEDIATRICS is on 2018 at 10:50 AM in [facility]  Last visit with DIMPLE ISAAC was on 2018 at  9:20 AM in [facility]  Last visit with PEDIATRICS was on 2018 at  9:20 AM in [facility]     WEIGHT AND HEIGHT: As of 2018 weight is 12.3125 lbs.(5.585 kg). Height is 1' 11.25\"(0.591 m).   BMI is 15.99 kg/(m^2) calculated from:     Height 1' 11.25\" (0.591 m) as of 4/9/18     Weight 12 lb 5 oz (5.585 kg) as of 4/9/18[TB1.1T]      No Known Allergies[TB1.2T]  No current outpatient prescriptions on file.      PHARMACY to use:[TB1.1T] n/a[TB1.1M]          Pharmacy preference(s) on file: [facility] [address]    CALL BACK INFO:[TB1.1T] Ok to leave response (including medical information) with family member or on answering machine[TB1.1M]  ROUTING:[TB1.1T] Patient's physician/staff[TB1.1M]        PCP: No primary care provider on file.         INS: Payor: [employer] / Plan: *No Plan* / Product Type: *No Product type* / Note: This is the primary coverage, but no account was found for this location or the patient's primary location.   ADDRESS:  [address] [place] [id number][TB1.1T]       Revision History        User Key Date/Time User Provider Type Action    > TB1.2 06/06/18  12:43 PM Leidy Watts Patient  Sign     TB1.1 06/06/18 12:41 PM Leidy Watts Patient      M - Manual, T - Template             negative...

## 2020-03-17 NOTE — H&P ADULT - PROBLEM SELECTOR PLAN 1
Admit to stroke unit, f/u MRI/2DEcho/Hypercoag w/u. Cont. Aspirin/Statin, Neuro checks, PT eval, check A1C, FLP. Neurology consult-called. OOB, Ambulate, DVT-P. No dysphagia, start on cardiac diet. Monitor for Afib, f/u EKG. May need MARC r/o PFO

## 2020-03-17 NOTE — PROGRESS NOTE ADULT - SUBJECTIVE AND OBJECTIVE BOX
REASON FOR CONSULT: telehospitalist evaluation    Outpatient physicians:  Dr. Himanshu Norwood (made aware)      HPI: 46 yo F with hx of HTN who presented with sudden onset of numbness and tingling to the R side of her face. Also reported that her R arm/leg felt weak and heavy. Her symptoms began at 8am this morning when she woke up, she thought that maybe she slept wrong and that was why this happened so went back to sleep until 10am. She realized that her sx were persistent, when she was trying to ambulate she found difficulty due to the weakness and heaviness of her R leg. That is when the pt decided to come to the ER but by then it was 2pm. In the ER pt deemed to be outside the TPA window and not given TPA. Pt given asa in the ER. Denies prior symptoms, no headache/ visual changes, slurred speech, dizziness, fever, chills or any other complaints.     PMH:   HTN     PSH:   S/P     S/P gastric surgery  lap band placement ; removed in   S/P tonsillectomy  2008.    Social Hx:  Denies smoking, alcohol or drug use     Family Hx:   -Grandfather/ sisters- HTN     Meds:   Norvasc 10mg po qd  Metoprolol 25mg po qd     Pharmacy: [address]     Allergies: Sulfa- rash       OBJECTIVE    Vital Signs Last 24 Hrs  T(C): 36.8 (17 Mar 2020 14:09), Max: 36.8 (17 Mar 2020 14:09)  T(F): 98.2 (17 Mar 2020 14:09), Max: 98.2 (17 Mar 2020 14:09)  HR: 78 (17 Mar 2020 19:34) (78 - 84)  BP: 142/96 (17 Mar 2020 19:34) (135/84 - 142/96)  BP(mean): --  RR: 18 (17 Mar 2020 19:34) (16 - 18)  SpO2: 100% (17 Mar 2020 19:34) (100% - 100%)        PHYSICAL EXAM: Tele-evaluation precludes physical exam.       LABS AND IMAGING DATA:                        12.0   8.67  )-----------( 308      ( 17 Mar 2020 16:05 )             36.8     03-17    138  |  101  |  11.0  ----------------------------<  91  3.6   |  26.0  |  0.62    Ca    9.4      17 Mar 2020 16:05    TPro  7.3  /  Alb  4.1  /  TBili  0.5  /  DBili  x   /  AST  19  /  ALT  20  /  AlkPhos  64  03-17    Urinalysis Basic - ( 17 Mar 2020 16:57 )    Color: Yellow / Appearance: Clear / S.015 / pH: x  Gluc: x / Ketone: Negative  / Bili: Negative / Urobili: Negative mg/dL   Blood: x / Protein: Negative mg/dL / Nitrite: Negative   Leuk Esterase: Negative / RBC: x / WBC x   Sq Epi: x / Non Sq Epi: x / Bacteria: x      ASSESSMENT AND PLAN:     46 yo F with hx of HTN who presented with sudden onset of numbness /tingling/ weakness to the R side of her face and body. Pt with persistence of symptoms but outside of the tpa window. Pt admitted with CVA, negative ct/cta hea and cta neck, pending MRI head.     Admit to Stroke unit     Acute CVA  R sided facial/ UE/LE numbness and weakness    -Not a candidate for TPA outside of the window   - monitoring on tele for arrhythmias  - pending ekg   -f/u lipid profile/ HBA1C/ TSH   -ct head negative  -cta head/negative for stenosis  - f/u MRI head  -f/u tte   - c/w asa po qd   - c/w atorvastatin po qhs   - dvt ppx   - neurology consulted   HTN  -given persistent sx and concern for CVA will allow for permissive htn   -holding home Norvasc   -will only treat bp if bp >220/110  -c/w monitoring bp closely  DVT ppx:  -c/w lovenox 40mg sq qd  Activity level: Increase as tolerated  Dispo: Pt is from home, pending PT/ OT consult.      Informed PMD- Dr. Norwood in regards to the admission.     Care plan discussed with Dr. Reyes REASON FOR CONSULT: telehospitalist evaluation    Outpatient physicians:  Dr. Himanshu Norwood (made aware)      HPI: 44 yo F with hx of HTN who presented with sudden onset of numbness and tingling to the R side of her face. Also reported that her R arm/leg felt weak and heavy. Her symptoms began at 8am this morning when she woke up, she thought that maybe she slept wrong and that was why this happened so went back to sleep until 10am. She realized that her sx were persistent, when she was trying to ambulate she found difficulty due to the weakness and heaviness of her R leg. That is when the pt decided to come to the ER but by then it was 2pm. In the ER pt deemed to be outside the TPA window and not given TPA. Pt given asa in the ER. Denies prior symptoms, no headache/ visual changes, slurred speech, dizziness, fever, chills or any other complaints.     PMH:   HTN     PSH:   S/P     S/P gastric surgery  lap band placement ; removed in   S/P tonsillectomy  2008.    Social Hx:  Denies smoking, alcohol or drug use     Family Hx:   -Grandfather/ sisters- HTN     Meds:   Norvasc 10mg po qd  Metoprolol 25mg po qd     Pharmacy: [address]     Allergies: Sulfa- rash       OBJECTIVE    Vital Signs Last 24 Hrs  T(C): 36.8 (17 Mar 2020 14:09), Max: 36.8 (17 Mar 2020 14:09)  T(F): 98.2 (17 Mar 2020 14:09), Max: 98.2 (17 Mar 2020 14:09)  HR: 78 (17 Mar 2020 19:34) (78 - 84)  BP: 142/96 (17 Mar 2020 19:34) (135/84 - 142/96)  BP(mean): --  RR: 18 (17 Mar 2020 19:34) (16 - 18)  SpO2: 100% (17 Mar 2020 19:34) (100% - 100%)        PHYSICAL EXAM: Tele-evaluation precludes physical exam.       LABS AND IMAGING DATA:                        12.0   8.67  )-----------( 308      ( 17 Mar 2020 16:05 )             36.8     03-17    138  |  101  |  11.0  ----------------------------<  91  3.6   |  26.0  |  0.62    Ca    9.4      17 Mar 2020 16:05    TPro  7.3  /  Alb  4.1  /  TBili  0.5  /  DBili  x   /  AST  19  /  ALT  20  /  AlkPhos  64  03-17    Urinalysis Basic - ( 17 Mar 2020 16:57 )    Color: Yellow / Appearance: Clear / S.015 / pH: x  Gluc: x / Ketone: Negative  / Bili: Negative / Urobili: Negative mg/dL   Blood: x / Protein: Negative mg/dL / Nitrite: Negative   Leuk Esterase: Negative / RBC: x / WBC x   Sq Epi: x / Non Sq Epi: x / Bacteria: x      ASSESSMENT AND PLAN:     44 yo F with hx of HTN who presented with sudden onset of numbness /tingling/ weakness to the R side of her face and body. Pt with persistence of symptoms but outside of the tpa window. Pt admitted with CVA, negative ct/cta hea and cta neck, pending MRI head.     Admit to Stroke unit     Acute CVA  R sided facial/ UE/LE numbness and weakness    -Not a candidate for TPA outside of the window   - monitoring on tele for arrhythmias  - pending ekg   -f/u lipid profile/ HBA1C/ TSH   - f/u hypercoagulable work up   -ct head negative  -cta head/negative for stenosis  - f/u MRI head  -f/u tte   - c/w asa po qd   - c/w atorvastatin po qhs   - dvt ppx   - neurology consulted   HTN  -given persistent sx and concern for CVA will allow for permissive htn   -holding home Norvasc   -will only treat bp if bp >220/110  -c/w monitoring bp closely  DVT ppx:  -c/w lovenox 40mg sq qd  Activity level: Increase as tolerated  Dispo: Pt is from home, pending PT/ OT consult.      Informed PMD- Dr. Norwood in regards to the admission.     Care plan discussed with Dr. Reyes REASON FOR CONSULT: telehospitalist evaluation    Outpatient physicians:  Dr. Himanshu Norwood (made aware)      HPI: 44 yo F with hx of HTN who presented with sudden onset of numbness and tingling to the R side of her face. Also reported that her R arm/leg felt weak and heavy. Her symptoms began at 8am this morning when she woke up, she thought that maybe she slept wrong and that was why this happened so went back to sleep until 10am. She realized that her sx were persistent, when she was trying to ambulate she found difficulty due to the weakness and heaviness of her R leg. That is when the pt decided to come to the ER but by then it was 2pm. In the ER pt deemed to be outside the TPA window and not given TPA. Pt given asa in the ER. Denies prior symptoms, no headache/ visual changes, slurred speech, dizziness, fever, chills or any other complaints.     PMH:   HTN     PSH:   S/P     S/P gastric surgery  lap band placement ; removed in   S/P tonsillectomy  2008.    Social Hx:  Denies smoking, alcohol or drug use     Family Hx:   -Grandfather/ sisters- HTN     Meds:   Norvasc 10mg po qd  Metoprolol 25mg po qd     Pharmacy: [address]     Allergies: Sulfa- rash       OBJECTIVE    Vital Signs Last 24 Hrs  T(C): 36.8 (17 Mar 2020 14:09), Max: 36.8 (17 Mar 2020 14:09)  T(F): 98.2 (17 Mar 2020 14:09), Max: 98.2 (17 Mar 2020 14:09)  HR: 78 (17 Mar 2020 19:34) (78 - 84)  BP: 142/96 (17 Mar 2020 19:34) (135/84 - 142/96)  BP(mean): --  RR: 18 (17 Mar 2020 19:34) (16 - 18)  SpO2: 100% (17 Mar 2020 19:34) (100% - 100%)        PHYSICAL EXAM: Tele-evaluation precludes physical exam.       LABS AND IMAGING DATA:                        12.0   8.67  )-----------( 308      ( 17 Mar 2020 16:05 )             36.8     03-17    138  |  101  |  11.0  ----------------------------<  91  3.6   |  26.0  |  0.62    Ca    9.4      17 Mar 2020 16:05    TPro  7.3  /  Alb  4.1  /  TBili  0.5  /  DBili  x   /  AST  19  /  ALT  20  /  AlkPhos  64  03-17    Urinalysis Basic - ( 17 Mar 2020 16:57 )    Color: Yellow / Appearance: Clear / S.015 / pH: x  Gluc: x / Ketone: Negative  / Bili: Negative / Urobili: Negative mg/dL   Blood: x / Protein: Negative mg/dL / Nitrite: Negative   Leuk Esterase: Negative / RBC: x / WBC x   Sq Epi: x / Non Sq Epi: x / Bacteria: x      ASSESSMENT AND PLAN:     44 yo F with hx of HTN who presented with sudden onset of numbness /tingling/ weakness to the R side of her face and body. Pt with persistence of symptoms but outside of the tpa window. Pt admitted with CVA, negative ct/cta hea and cta neck, pending MRI head.     Admit to Stroke unit     Acute CVA  R sided facial/ UE/LE numbness and weakness    -Not a candidate for TPA outside of the window   - monitoring on tele for arrhythmias  - pending ekg   -f/u lipid profile/ HBA1C/ TSH   - f/u hypercoagulable work up   -ct head negative  -cta head/negative for stenosis  - f/u MRI head  -f/u tte   - c/w asa po qd   - c/w atorvastatin po qhs   - dvt ppx   -Dysphagia screening with RN  - PT/OT consulted   - neurology consulted   HTN  -given persistent sx and concern for CVA will allow for permissive htn   -holding home Norvasc   -will only treat bp if bp >220/110  -c/w monitoring bp closely  DVT ppx:  -c/w lovenox 40mg sq qd  Activity level: Increase as tolerated  Dispo: Pt is from home, pending PT/ OT consult.      Informed PMD- Dr. Norwood in regards to the admission.     Care plan discussed with Dr. Reyes

## 2020-03-18 ENCOUNTER — TRANSCRIPTION ENCOUNTER (OUTPATIENT)
Age: 45
End: 2020-03-18

## 2020-03-18 DIAGNOSIS — G95.9 DISEASE OF SPINAL CORD, UNSPECIFIED: ICD-10-CM

## 2020-03-18 LAB
CHOLEST SERPL-MCNC: 152 MG/DL — SIGNIFICANT CHANGE UP (ref 110–199)
CRP SERPL-MCNC: <0.4 MG/DL — SIGNIFICANT CHANGE UP (ref 0–0.4)
ERYTHROCYTE [SEDIMENTATION RATE] IN BLOOD: 21 MM/HR — HIGH (ref 0–20)
FOLATE SERPL-MCNC: 15.7 NG/ML — SIGNIFICANT CHANGE UP
HBA1C BLD-MCNC: 4.4 % — SIGNIFICANT CHANGE UP (ref 4–5.6)
HCYS SERPL-MCNC: 4.9 UMOL/L — SIGNIFICANT CHANGE UP
HDLC SERPL-MCNC: 37 MG/DL — LOW
LIPID PNL WITH DIRECT LDL SERPL: 89 MG/DL — SIGNIFICANT CHANGE UP
RHEUMATOID FACT SERPL-ACNC: <10 IU/ML — SIGNIFICANT CHANGE UP (ref 0–13)
T PALLIDUM AB TITR SER: NEGATIVE — SIGNIFICANT CHANGE UP
TOTAL CHOLESTEROL/HDL RATIO MEASUREMENT: 4 RATIO — SIGNIFICANT CHANGE UP (ref 3.3–7.1)
TRIGL SERPL-MCNC: 129 MG/DL — SIGNIFICANT CHANGE UP (ref 10–200)
TSH SERPL-MCNC: 2.23 UIU/ML — SIGNIFICANT CHANGE UP (ref 0.27–4.2)
VIT B12 SERPL-MCNC: 497 PG/ML — SIGNIFICANT CHANGE UP (ref 232–1245)

## 2020-03-18 PROCEDURE — 99233 SBSQ HOSP IP/OBS HIGH 50: CPT | Mod: GC

## 2020-03-18 PROCEDURE — 72156 MRI NECK SPINE W/O & W/DYE: CPT | Mod: 26

## 2020-03-18 RX ORDER — DEXTROSE 50 % IN WATER 50 %
25 SYRINGE (ML) INTRAVENOUS ONCE
Refills: 0 | Status: DISCONTINUED | OUTPATIENT
Start: 2020-03-18 | End: 2020-03-22

## 2020-03-18 RX ORDER — GLUCAGON INJECTION, SOLUTION 0.5 MG/.1ML
1 INJECTION, SOLUTION SUBCUTANEOUS ONCE
Refills: 0 | Status: DISCONTINUED | OUTPATIENT
Start: 2020-03-18 | End: 2020-03-22

## 2020-03-18 RX ORDER — SODIUM CHLORIDE 9 MG/ML
1000 INJECTION, SOLUTION INTRAVENOUS
Refills: 0 | Status: DISCONTINUED | OUTPATIENT
Start: 2020-03-18 | End: 2020-03-22

## 2020-03-18 RX ORDER — PREGABALIN 225 MG/1
1000 CAPSULE ORAL DAILY
Refills: 0 | Status: COMPLETED | OUTPATIENT
Start: 2020-03-18 | End: 2020-03-20

## 2020-03-18 RX ORDER — DEXTROSE 50 % IN WATER 50 %
15 SYRINGE (ML) INTRAVENOUS ONCE
Refills: 0 | Status: DISCONTINUED | OUTPATIENT
Start: 2020-03-18 | End: 2020-03-22

## 2020-03-18 RX ORDER — ACETAMINOPHEN 500 MG
650 TABLET ORAL EVERY 6 HOURS
Refills: 0 | Status: DISCONTINUED | OUTPATIENT
Start: 2020-03-18 | End: 2020-03-22

## 2020-03-18 RX ORDER — DEXTROSE 50 % IN WATER 50 %
12.5 SYRINGE (ML) INTRAVENOUS ONCE
Refills: 0 | Status: DISCONTINUED | OUTPATIENT
Start: 2020-03-18 | End: 2020-03-22

## 2020-03-18 RX ORDER — LIDOCAINE HCL 20 MG/ML
20 VIAL (ML) INJECTION ONCE
Refills: 0 | Status: COMPLETED | OUTPATIENT
Start: 2020-03-18 | End: 2020-03-18

## 2020-03-18 RX ORDER — ACETAMINOPHEN 500 MG
650 TABLET ORAL ONCE
Refills: 0 | Status: COMPLETED | OUTPATIENT
Start: 2020-03-18 | End: 2020-03-18

## 2020-03-18 RX ADMIN — PREGABALIN 1000 MICROGRAM(S): 225 CAPSULE ORAL at 16:22

## 2020-03-18 RX ADMIN — Medication 650 MILLIGRAM(S): at 09:16

## 2020-03-18 RX ADMIN — ATORVASTATIN CALCIUM 20 MILLIGRAM(S): 80 TABLET, FILM COATED ORAL at 00:58

## 2020-03-18 RX ADMIN — Medication 650 MILLIGRAM(S): at 22:22

## 2020-03-18 RX ADMIN — Medication 58 MILLIGRAM(S): at 16:21

## 2020-03-18 RX ADMIN — Medication 81 MILLIGRAM(S): at 12:37

## 2020-03-18 RX ADMIN — ATORVASTATIN CALCIUM 20 MILLIGRAM(S): 80 TABLET, FILM COATED ORAL at 22:22

## 2020-03-18 RX ADMIN — Medication 20 MILLILITER(S): at 15:57

## 2020-03-18 RX ADMIN — Medication 650 MILLIGRAM(S): at 09:46

## 2020-03-18 RX ADMIN — Medication 650 MILLIGRAM(S): at 23:00

## 2020-03-18 NOTE — PROGRESS NOTE ADULT - ASSESSMENT
46 y/o female with PMH of HTN presenting to ER with Rt facial, Rt UE and LE weakness and numbness, NIH on admission 3 admitted for r/o CVA vs myelitis/demyelinating lesions. CT head negative for CVA/ICH. MR head few T2/FLAIR hyperintese foci in subcortical, periventricular and deep white matter non specific finding microangiopathic, postinflammatory/postinfectious or demyelinating etiology. MR C-spine enhancing T2/STIR hyperintense intramedullary lesion in Rt aspect of C1-C2 9x4mm, non specific lesion. LP at bedside unsucessful, pending IR LP. Patient with possible myelitis started on solumedrol 1gm x 5 days. Neurology consulted.    Rt UE and LE weakness 2/2 likely myelitis, acute CVA ruled out  - CT head negative for CVA/ICH.  - MR head. No acute infarction, ICH, mass. few T2/FLAIR hyperintese foci in subcortical, periventricular and deep white matter non specific finding microangiopathic, postinflammatory/postinfectious or demyelinating etiology.   - MR C-spine enhancing T2/STIR hyperintense intramedullary lesion in Rt aspect of C1-C2 9x4mm, non specific lesion.  - s/p bedside LP unsuccessful - pending IR LP - f/u CSF studies  - hold DVT ppx and ASA  - s/p  mg x 1 in ED  - f/u ECHO  - Start solumedrol 1gm x 5 days per neurology  - PT/OT eval: outpatient PT  - Bedside dysphagia screen pass  - q2 neurochecks  - Ambulate as tolerated  - Neurology consulted and     HTN  - initially held due to possible CVA which is ruled out  - Pt with labile BP; will restart home norvasc with holding parameters, if needed will start home metoprolol  - monitor BP    Advance directive: full code    Disposition: home when medically stable. home w/ home PT. 46 y/o female with PMH of HTN presenting to ER with Rt facial, Rt UE and LE weakness and numbness, NIH on admission 3 admitted for r/o CVA vs myelitis/demyelinating lesions. CT head negative for CVA/ICH. MR head few T2/FLAIR hyperintese foci in subcortical, periventricular and deep white matter non specific finding microangiopathic, postinflammatory/postinfectious or demyelinating etiology. MR C-spine enhancing T2/STIR hyperintense intramedullary lesion in Rt aspect of C1-C2 9x4mm, non specific lesion. LP at bedside unsucessful, pending IR LP. Patient with possible myelitis started on solumedrol 1gm x 5 days. Neurology consulted.    Rt UE and LE weakness 2/2 possible myelitis vs demyelinating syndrome , acute CVA ruled out  - CT head negative for CVA/ICH.  - MR head. No acute infarction, ICH, mass. few T2/FLAIR hyperintese foci in subcortical, periventricular and deep white matter non specific finding microangiopathic, postinflammatory/postinfectious or demyelinating etiology.   - MR C-spine enhancing T2/STIR hyperintense intramedullary lesion in Rt aspect of C1-C2 9x4mm, non specific lesion.  - s/p bedside LP unsuccessful - pending IR LP - f/u CSF studies  - hold DVT ppx and ASA  - s/p  mg x 1 in ED  - f/u ECHO  - Start solumedrol 1gm x 5 days per neurology  - PT/OT eval: outpatient PT  - Bedside dysphagia screen pass  - q2 neurochecks  - Ambulate as tolerated  - Neurology consulted and     HTN  - initially held due to possible CVA which is ruled out  - Pt with labile BP; will restart home norvasc with holding parameters, if needed will start home metoprolol  - monitor BP    Advance directive: full code    Disposition: home when medically stable. home w/ home PT.

## 2020-03-18 NOTE — OCCUPATIONAL THERAPY INITIAL EVALUATION ADULT - PERTINENT HX OF CURRENT PROBLEM, REHAB EVAL
Pt presents to ED with c/o numbness/tingling to right face and right arm as well as right UE/LE weakness. MRI head with few T2/FLAIR hyperintense foci in the subcortical, periventricular, and deep white matter, nonspecific finding, which may represent sequelae of microangiopathic, postinflammatory/postinfectious, or demyelinating etiology.

## 2020-03-18 NOTE — OCCUPATIONAL THERAPY INITIAL EVALUATION ADULT - ADDITIONAL COMMENTS
Pt lives in first floor, single level apartment with no ALEJANDRO and no steps inside. Bathroom has bathtub with curtains. Pt does not own any DME. Pt is right handed. Pt drives. Pt's  is unemployed and able to assist if needed upon discharge.

## 2020-03-18 NOTE — OCCUPATIONAL THERAPY INITIAL EVALUATION ADULT - COORDINATION ASSESSED, REHAB EVAL
left UE/LE intact, right UE/LE with impairments noted/finger to nose/heel to shin finger to nose/left UE/LE intact, right UE/LE with impairments noted/heel to shin

## 2020-03-18 NOTE — PROGRESS NOTE ADULT - SUBJECTIVE AND OBJECTIVE BOX
Patient is a 45y old  Female who presents with a chief complaint of CVA (18 Mar 2020 15:25)    INTERVAL HPI/OVERNIGHT EVENTS:  Patient seen and examined at bedside. Patient admitted last night from ED. This AM pt states her Rt side feels heavy, a bit better than yesterday. Pt states that when she touches her right arm, she has increased tingling sensations. Pt is able to walk with mild weakness. PT/OT evaluation, with outpatient PT. +reports remote history urinary incontinence. No FMHx of MS. Patient reports good appetite, tolerating PO intake, voiding    ROS: Denies CP, SOB, [place], abd pain, nausea/vomiting, diarrhea/constipation, fever chills, LE pain.    Vital Signs Last 24 Hrs  T(C): 36.8 (18 Mar 2020 16:15), Max: 36.8 (18 Mar 2020 16:15)  T(F): 98.2 (18 Mar 2020 16:15), Max: 98.2 (18 Mar 2020 16:15)  HR: 64 (18 Mar 2020 04:38) (64 - 79)  BP: 108/72 (18 Mar 2020 04:38) (108/72 - 142/96)  BP(mean): 111 (17 Mar 2020 23:20) (111 - 111)  RR: 20 (18 Mar 2020 04:38) (16 - 20)  SpO2: 100% (18 Mar 2020 04:38) (98% - 100%)      17 Mar 2020 07:01  -  18 Mar 2020 07:00  --------------------------------------------------------  IN: 200 mL / OUT: 0 mL / NET: 200 mL    PHYSICAL EXAM:  GEN:  female, sitting in bed, well-groomed, well-developed, able to speak in full sentences  HEENT: NC/AT, clear sclera, PEERL, EOMI, moist oral mucosa, no tongue and uvula deviation  NECK: Supple, ROM complete  Pul: Clear to auscultation bilaterally; No rales, rhonchi, wheezing, or rubs  CVS: +S1, +S2, Regular rate and rhythm; No murmurs, rubs, or gallops  GI: Soft, Nontender, Nondistended; Bowel sounds present  EXT:  2+ Peripheral Pulses, No clubbing, cyanosis, or edema  Neuro:  Alert & Oriented X4, Good concentration; Motor Strength 4/5 Rt upper and lower extremities, 5/5 LT upper and lower extremities; DTRs 2+ Rt brachial, sensory intact, no CN defecit  SKIN: No rashes or lesions    LABS:                        12.0   8.67  )-----------( 308      ( 17 Mar 2020 16:05 )             36.8     03-    138  |  101  |  11.0  ----------------------------<  91  3.6   |  26.0  |  0.62    Ca    9.4      17 Mar 2020 16:05    TPro  7.3  /  Alb  4.1  /  TBili  0.5  /  DBili  x   /  AST  19  /  ALT  20  /  AlkPhos  64  -17    PT/INR - ( 17 Mar 2020 16:05 )   PT: 11.1 sec;   INR: 0.98 ratio      PTT - ( 17 Mar 2020 16:05 )  PTT:32.9 sec  Urinalysis Basic - ( 17 Mar 2020 16:57 )    Color: Yellow / Appearance: Clear / S.015 / pH: x  Gluc: x / Ketone: Negative  / Bili: Negative / Urobili: Negative mg/dL   Blood: x / Protein: Negative mg/dL / Nitrite: Negative   Leuk Esterase: Negative / RBC: x / WBC x   Sq Epi: x / Non Sq Epi: x / Bacteria: x      RADIOLOGY & ADDITIONAL TESTS:  < from: CT Head No Cont (20 @ 15:43) >  FINDINGS:  There is periventricular and subcortical white matter hypodensity without mass effect, nonspecific, likely representing mild chronic microvascular ischemic changes. There is no compelling evidence for an acute transcortical infarction. There is no evidence of mass, mass effect, midline shift or extra-axial fluid collection. The lateral ventricles and cortical sulci are age-appropriate in size and configuration. Mild inflammatory mucosal changes are seen throughout thevarious portions of the paranasal sinuses. The orbits and mastoid air cells are unremarkable. The calvarium is intact.    IMPRESSION:  Mild chronic microvascular changes without evidence of an acute transcortical infarction or hemorrhage. MR is a more sensitive imaging modality for the evaluation of an acute infarction.     < end of copied text >    < from: MR Head No Cont (20 @ 23:30) >  IMPRESSION:     Few T2/FLAIR hyperintense foci in the subcortical, periventricular, and deep white matter, nonspecific finding, which may represent sequelae of microangiopathic, postinflammatory/postinfectious, or demyelinating etiology. Clinical correlation is advised.    < end of copied text >    < from: MR Cervical Spine w/wo IV Cont (20 @ 12:13) >  IMPRESSION:     Enhancing T2/STIR hyperintense intramedullary lesion in the right aspect of the spinal cord at C1-C2 level measuring 9 x 4 mm. This finding is nonspecific and may represent neoplastic, demyelinating and/or infectious pathology. Clinical correlation is advised.    < end of copied text >    pending ECHO read    MEDICATIONS  (STANDING):  atorvastatin 20 milliGRAM(s) Oral at bedtime  cyanocobalamin Injectable 1000 MICROGram(s) IntraMuscular daily  methylPREDNISolone sodium succinate IVPB 1000 milliGRAM(s) IV Intermittent daily

## 2020-03-18 NOTE — PHYSICAL THERAPY INITIAL EVALUATION ADULT - ADDITIONAL COMMENTS
Pt lives with brother in an apartment, no steps.  Independent with all PTA, without devices.  (+) driving, (+) working

## 2020-03-18 NOTE — OCCUPATIONAL THERAPY INITIAL EVALUATION ADULT - ASSISTIVE DEVICE FOR TOILET TRANSFER, REHAB EVAL
standard toilet seat with left grab bar; recommend use of commode over standard toilet at home to increase surface height and provide armrests; pt in agreement and interested in using commode upon discharge

## 2020-03-18 NOTE — CHART NOTE - NSCHARTNOTEFT_GEN_A_CORE
Pt continues to c/o weakness right hand  Pt without any other complaints  A+OX3 cooperative NAD   Strength 4/5 right upper extremity  Continue to monitor  Neuro checks q 2 hrs   Call PA if any changes in pts condition

## 2020-03-18 NOTE — CONSULT NOTE ADULT - ASSESSMENT
possible cervical myelopathy/myelitis, recommend stat mri c-spine w/wo, bw, will need lp after lesion confirmed on mri c-spine .

## 2020-03-18 NOTE — DISCHARGE NOTE NURSING/CASE MANAGEMENT/SOCIAL WORK - NSDCPEPTSTRK_GEN_ALL_CORE
Risk factors for stroke/Stroke warning signs and symptoms/Signs and symptoms of stroke/Stroke support groups for patients, families, and friends/Need for follow up after discharge/Prescribed medications/Stroke education booklet/Call 911 for stroke

## 2020-03-18 NOTE — OCCUPATIONAL THERAPY INITIAL EVALUATION ADULT - RANGE OF MOTION EXAMINATION, UPPER EXTREMITY
Left UE Active ROM was WFL (within functional limits)/right shoulder flexion to about 90 degrees only AROM (PROM WFL), right elbow flexion/extension AROM WFL, right gross grasp and digit extension AROM WFL

## 2020-03-18 NOTE — OCCUPATIONAL THERAPY INITIAL EVALUATION ADULT - MANUAL MUSCLE TESTING RESULTS, REHAB EVAL
right shoulder 2/5, right elbow flexion/extension 3/5, right gross grasp 3+/5; left shoulder 5/5, left elbow flexion/extension 5/5, left gross grasp 5/5

## 2020-03-18 NOTE — DISCHARGE NOTE NURSING/CASE MANAGEMENT/SOCIAL WORK - PATIENT PORTAL LINK FT
You can access the FollowMyHealth Patient Portal offered by [facility] by registering at the following website: [email or web address]. By joining [facility]’s FollowMyHealth portal, you will also be able to view your health information using other applications (apps) compatible with our system.

## 2020-03-18 NOTE — CONSULT NOTE ADULT - SUBJECTIVE AND OBJECTIVE BOX
HPI:  46 y/o female with history of HTN which is reported to be well controlled was in her normal state of health until yesterday. She woke up 10 ma and felt right sided numbness and weakness. Symptoms have been persisted today. no speech abnormalities. mri brain negative for acute cva, but upper cord seen shows hyperintense signal on right side,  PAST MEDICAL & SURGICAL HISTORY:  Anemia  Hypertension  S/P gastric surgery: lap band placement ; removed in   S/P tonsillectomy: 2008  S/P  and partial hystrectomy.       REVIEW OF SYSTEMS:    CONSTITUTIONAL: No fever, weight loss, or fatigue  EYES: No eye pain, visual disturbances, or discharge  ENMT:  No difficulty hearing, tinnitus, vertigo; No sinus or throat pain  NECK: No pain or stiffness  RESPIRATORY: No cough, wheezing, chills or hemoptysis; No shortness of breath  CARDIOVASCULAR: No chest pain, palpitations, dizziness, or leg swelling  GASTROINTESTINAL: No abdominal or epigastric pain. No nausea, vomiting, or hematemesis; No diarrhea or constipation. No melena or hematochezia.  GENITOURINARY: No dysuria, frequency, hematuria, or incontinence  NEUROLOGICAL: No headaches, memory loss.  SKIN: No itching, burning, rashes, or lesions   LYMPH NODES: No enlarged glands  ENDOCRINE: No heat or cold intolerance; No hair loss  MUSCULOSKELETAL: No joint pain or swelling; No muscle, back, or extremity pain  PSYCHIATRIC: No depression, anxiety, mood swings, or difficulty sleeping  HEME/LYMPH: No easy bruising, or bleeding gums    MEDICATIONS  (STANDING):  aspirin enteric coated 81 milliGRAM(s) Oral daily  atorvastatin 20 milliGRAM(s) Oral at bedtime  enoxaparin Injectable 40 milliGRAM(s) SubCutaneous daily    MEDICATIONS  (PRN):      Allergies    sulfa drugs (Rash)    Intolerances        SOCIAL HISTORY:    FAMILY HISTORY:  Family history of CVA: grandfather age 65  Family history of hypertension (Sibling)      PHYSICAL EXAM:  Vital Signs Last 24 Hrs  T(F): 98 (20 @ 07:36)  HR: 64 (20 @ 04:38)  BP: 108/72 (20 @ 04:38)  RR: 20 (20 @ 04:38)    GENERAL: NAD, well-groomed, well-developed  HEAD:  Atraumatic, Normocephalic  EYES: EOMI, PERRLA,  NECK: Supple, No JVD, Normal thyroid, no carotid bruit bilateral  NERVOUS SYSTEM:  Alert & Oriented X3, speech and language normal, cranial nerves II-XII normal,   Good concentration; Motor Strength 4/5 right  upper and lower extremities; left side 5/5 DTRs 2+ intact and symmetric, plantar responses flexor bilaterally, sensory exam dec  to light touch on right side. moderate dysmetria right side.   HEART: Regular rate and rhythm; No murmurs, rubs, or gallops          LABS:                        12.0   8.67  )-----------( 308      ( 17 Mar 2020 16:05 )             36.8     03-17    138  |  101  |  11.0  ----------------------------<  91  3.6   |  26.0  |  0.62    Ca    9.4      17 Mar 2020 16:05    TPro  7.3  /  Alb  4.1  /  TBili  0.5  /  DBili  x   /  AST  19  /  ALT  20  /  AlkPhos  64  03-17    PT/INR - ( 17 Mar 2020 16:05 )   PT: 11.1 sec;   INR: 0.98 ratio         PTT - ( 17 Mar 2020 16:05 )  PTT:32.9 sec  Urinalysis Basic - ( 17 Mar 2020 16:57 )    Color: Yellow / Appearance: Clear / S.015 / pH: x  Gluc: x / Ketone: Negative  / Bili: Negative / Urobili: Negative mg/dL   Blood: x / Protein: Negative mg/dL / Nitrite: Negative   Leuk Esterase: Negative / RBC: x / WBC x   Sq Epi: x / Non Sq Epi: x / Bacteria: x        RADIOLOGY & ADDITIONAL STUDIES:

## 2020-03-19 DIAGNOSIS — G37.3 ACUTE TRANSVERSE MYELITIS IN DEMYELINATING DISEASE OF CENTRAL NERVOUS SYSTEM: ICD-10-CM

## 2020-03-19 LAB
ANA PAT FLD IF-IMP: ABNORMAL
ANA TITR SER: ABNORMAL
ANTI-RIBONUCLEAR PROTEIN: <0.2 AI — SIGNIFICANT CHANGE UP
APCR PPP: 2.94 RATIO — SIGNIFICANT CHANGE UP
APPEARANCE CSF: CLEAR — SIGNIFICANT CHANGE UP
AT III ACT/NOR PPP CHRO: 100 % — SIGNIFICANT CHANGE UP (ref 85–135)
B2 GLYCOPROT1 AB SER QL: NEGATIVE — SIGNIFICANT CHANGE UP
CARDIOLIPIN AB SER-ACNC: NEGATIVE — SIGNIFICANT CHANGE UP
COLOR CSF: SIGNIFICANT CHANGE UP
ENA SM AB FLD QL: <0.2 AI — SIGNIFICANT CHANGE UP
GLUCOSE BLDC GLUCOMTR-MCNC: 143 MG/DL — HIGH (ref 70–99)
GLUCOSE BLDC GLUCOMTR-MCNC: 176 MG/DL — HIGH (ref 70–99)
GLUCOSE CSF-MCNC: 101 MG/DLG/24H — HIGH (ref 40–70)
GRAM STN FLD: SIGNIFICANT CHANGE UP
NEUTROPHILS # CSF: SIGNIFICANT CHANGE UP % (ref 0–6)
NRBC NFR CSF: 4 /UL — SIGNIFICANT CHANGE UP (ref 0–5)
PROT C ACT/NOR PPP: 114 % — SIGNIFICANT CHANGE UP (ref 74–150)
PROT CSF-MCNC: 18 MG/DL — SIGNIFICANT CHANGE UP (ref 15–45)
RBC # CSF: 2 /CMM — HIGH (ref 0–1)
TUBE TYPE: SIGNIFICANT CHANGE UP

## 2020-03-19 PROCEDURE — 99232 SBSQ HOSP IP/OBS MODERATE 35: CPT | Mod: GC

## 2020-03-19 RX ORDER — TRAMADOL HYDROCHLORIDE 50 MG/1
50 TABLET ORAL EVERY 8 HOURS
Refills: 0 | Status: DISCONTINUED | OUTPATIENT
Start: 2020-03-19 | End: 2020-03-22

## 2020-03-19 RX ORDER — TRAMADOL HYDROCHLORIDE 50 MG/1
25 TABLET ORAL EVERY 8 HOURS
Refills: 0 | Status: DISCONTINUED | OUTPATIENT
Start: 2020-03-19 | End: 2020-03-22

## 2020-03-19 RX ADMIN — PREGABALIN 1000 MICROGRAM(S): 225 CAPSULE ORAL at 11:01

## 2020-03-19 RX ADMIN — TRAMADOL HYDROCHLORIDE 25 MILLIGRAM(S): 50 TABLET ORAL at 19:12

## 2020-03-19 RX ADMIN — TRAMADOL HYDROCHLORIDE 50 MILLIGRAM(S): 50 TABLET ORAL at 10:45

## 2020-03-19 RX ADMIN — TRAMADOL HYDROCHLORIDE 50 MILLIGRAM(S): 50 TABLET ORAL at 11:45

## 2020-03-19 RX ADMIN — TRAMADOL HYDROCHLORIDE 25 MILLIGRAM(S): 50 TABLET ORAL at 18:12

## 2020-03-19 RX ADMIN — Medication 58 MILLIGRAM(S): at 05:45

## 2020-03-19 RX ADMIN — Medication 650 MILLIGRAM(S): at 06:34

## 2020-03-19 RX ADMIN — ATORVASTATIN CALCIUM 20 MILLIGRAM(S): 80 TABLET, FILM COATED ORAL at 21:28

## 2020-03-19 RX ADMIN — Medication 650 MILLIGRAM(S): at 05:44

## 2020-03-19 NOTE — PROGRESS NOTE ADULT - SUBJECTIVE AND OBJECTIVE BOX
INTERVAL HISTORY:  Seen at bedside and no new changes.  Awaits for further testing.    sulfa drugs (Rash)      VITAL SIGNS:  Vital Signs Last 24 Hrs  T(C): 36.9 (19 Mar 2020 07:23), Max: 36.9 (18 Mar 2020 19:40)  T(F): 98.4 (19 Mar 2020 07:23), Max: 98.5 (18 Mar 2020 19:40)  HR: 80 (19 Mar 2020 08:24) (80 - 103)  BP: 130/76 (19 Mar 2020 08:24) (124/70 - 138/71)  BP(mean): 83 (19 Mar 2020 04:00) (83 - 94)  RR: 17 (19 Mar 2020 08:24) (16 - 22)  SpO2: 100% (19 Mar 2020 08:24) (97% - 100%)    PHYSICAL EXAMINATION:  General: Well-developed, well nourished, in no acute distress.  Eyes: Conjunctiva and sclera clear.  Neurologic:  - Mental Status:  Alert, awake, oriented to person, place, and time; Speech is normal; Affect is normal.  - Cranial Nerves: II-XI intact.  - Motor:  Strength is 5/5 throughout L side and R side 4/5.  There is R sided pronator drift.  Normal muscle bulk and tone throughout.  - Reflexes:  2+ throughout; Plantars wd b/l.  - Sensory:  Dec to LT on R side  - Coordination:  + R sided dysmetria/dysdiadochokinesis.    MEDS:  MEDICATIONS  (STANDING):  atorvastatin 20 milliGRAM(s) Oral at bedtime  cyanocobalamin Injectable 1000 MICROGram(s) IntraMuscular daily  dextrose 5%. 1000 milliLiter(s) (50 mL/Hr) IV Continuous <Continuous>  dextrose 50% Injectable 12.5 Gram(s) IV Push once  dextrose 50% Injectable 25 Gram(s) IV Push once  dextrose 50% Injectable 25 Gram(s) IV Push once  methylPREDNISolone sodium succinate IVPB 1000 milliGRAM(s) IV Intermittent daily    MEDICATIONS  (PRN):  acetaminophen   Tablet .. 650 milliGRAM(s) Oral every 6 hours PRN Temp greater or equal to 38C (100.4F), Mild Pain (1 - 3)  dextrose 40% Gel 15 Gram(s) Oral once PRN Blood Glucose LESS THAN 70 milliGRAM(s)/deciLiter  glucagon  Injectable 1 milliGRAM(s) IntraMuscular once PRN Glucose <70 milliGRAM(s)/deciLiter  traMADol 50 milliGRAM(s) Oral every 8 hours PRN Severe Pain (7 - 10)  traMADol 25 milliGRAM(s) Oral every 8 hours PRN Moderate Pain (4 - 6)      LABS:                          12.0   8.67  )-----------( 308      ( 17 Mar 2020 16:05 )             36.8     03-17    138  |  101  |  11.0  ----------------------------<  91  3.6   |  26.0  |  0.62    Ca    9.4      17 Mar 2020 16:05    TPro  7.3  /  Alb  4.1  /  TBili  0.5  /  DBili  x   /  AST  19  /  ALT  20  /  AlkPhos  64  03-17    LIVER FUNCTIONS - ( 17 Mar 2020 16:05 )  Alb: 4.1 g/dL / Pro: 7.3 g/dL / ALK PHOS: 64 U/L / ALT: 20 U/L / AST: 19 U/L / GGT: x               RADIOLOGY & ADDITIONAL STUDIES:      < from: MR Cervical Spine w/wo IV Cont (03.18.20 @ 12:13) >  Enhancing T2/STIR hyperintense intramedullary lesion in the right aspect of the spinal cord at C1-C2 level measuring 9 x 4 mm. This finding is nonspecific and may represent neoplastic, demyelinating and/or infectious pathology. Clinical correlation is advised.      < from: MR Head No Cont (03.17.20 @ 23:30) >  1. Abnormal increased T2 signal lesion 4 x 4 mm right posterior aspect of the   cervical cord at the C1 level, incompletely visualized on this exam. Correlate   clinically. Consider further evaluation with cervical spine MRI.   2. Several small high T2 and flair signal lesions in the deep white matter of   both hemispheres which are nonspecific. Possible etiologies includechanges   related to vasculitis or migraine headache, lyme disease, early small vessel   ischemic changes or foci of demyelination. Correlate clinically.      < from: CT Angio Neck w/ IV Cont (03.17.20 @ 17:37) >    CTA HEAD: No vessel narrowing or occlusion in the neck.    CTA NECK: No evidence of stenosis or occlusion in the [place].      < from: CT Head No Cont (03.17.20 @ 15:43) >  Mild chronic microvascular changes without evidence of an acute transcortical infarction or hemorrhage. MR is a more sensitive imaging modality for the evaluation of an acute infarction.

## 2020-03-19 NOTE — PROGRESS NOTE ADULT - ASSESSMENT
46 y/o female with PMH of HTN presenting to ER with Rt facial, Rt UE and LE weakness and numbness, NIH on admission 3 admitted for r/o CVA vs myelitis/demyelinating lesions. CT head negative for CVA/ICH. MR head few T2/FLAIR hyperintese foci in subcortical, periventricular and deep white matter non specific finding microangiopathic, postinflammatory/postinfectious or demyelinating etiology. MR C-spine enhancing T2/STIR hyperintense intramedullary lesion in Rt aspect of C1-C2 9x4mm, non specific lesion. LP at bedside unsucessful s/p IR LP on 3/19. Patient with possible myelitis started on solumedrol 1gm x 5 days. Neurology consulted.    Rt UE and LE weakness 2/2 possible myelitis vs demyelinating syndrome , acute CVA ruled out  - CT head negative for CVA/ICH.  - MR head. No acute infarction, ICH, mass. few T2/FLAIR hyperintese foci in subcortical, periventricular and deep white matter non specific finding microangiopathic, postinflammatory/postinfectious or demyelinating etiology.   - MR C-spine enhancing T2/STIR hyperintense intramedullary lesion in Rt aspect of C1-C2 9x4mm, non specific lesion.\\  - f/u MR Thoracic spine w/ IV contrast  - s/p bedside LP unsuccessful - 3/19 IR LP - f/u CSF studies  - hold DVT ppx and ASA  - s/p  mg x 1 in ED  - Echo: EF 55-60%  - Start solumedrol 1gm x 5 days (d#2) per neurology  - PT/OT eval: outpatient PT  - Bedside dysphagia screen pass  - q2 neurochecks  - Ambulate as tolerated  - Neurology consulted and recs appreciated     HTN  - initially held due to possible CVA which is ruled out  - Pt with -140; will restart home norvasc with holding parameters, if needed will start home metoprolol  - monitor BP    Advance directive: full code    Disposition: home when medically stable. home w/ home PT.

## 2020-03-19 NOTE — PROGRESS NOTE ADULT - ASSESSMENT
Impression:  Transverse myelitis    Plan:    MRI T spine with and without sofie to assess ms  IV Solumederol #2/5 today + PPI and then PO prednisone taper thereafter.  PT/OT.  Await for LP through IR today as serologies ordered.  DVT prophylaxis recommended.  Pt also reports some sided puffiness and reports been managed by Dr Hong.  Answered all questions to the best from neuro standpoint.  Will follow.

## 2020-03-19 NOTE — PROGRESS NOTE ADULT - SUBJECTIVE AND OBJECTIVE BOX
Patient is a 45y old  Female who presents with a chief complaint of CVA (18 Mar 2020 15:25)    INTERVAL HPI/OVERNIGHT EVENTS:  Patient seen and examined at bedside. This AM pt states she still feels the same, her Rt side feels heavy, minimal improvement. Pt states that when she touches her sole of Right foot, she has increased tingling sensations. Pt is able to walk with mild weakness. PT/OT evaluation, with outpatient PT. +reports remote history urinary incontinence. No FMHx of MS. Patient reports good appetite, tolerating PO intake, voiding  Pt has been NPO for LP today    ROS: Denies CP, SOB, [place], abd pain, nausea/vomiting, diarrhea/constipation, fever chills, LE pain.    Vital Signs Last 24 Hrs  T(C): 36.6 (19 Mar 2020 16:40), Max: 36.9 (18 Mar 2020 19:40)  T(F): 97.8 (19 Mar 2020 16:40), Max: 98.5 (18 Mar 2020 19:40)  HR: 88 (19 Mar 2020 16:47) (80 - 103)  BP: 135/80 (19 Mar 2020 16:47) (124/70 - 140/79)  BP(mean): 83 (19 Mar 2020 04:00) (83 - 94)  RR: 19 (19 Mar 2020 16:47) (16 - 20)  SpO2: 95% (19 Mar 2020 16:47) (95% - 100%)    PHYSICAL EXAM:  GEN:  female, sitting in bed, well-groomed, well-developed, able to speak in full sentences  HEENT: NC/AT, clear sclera, PEERL, EOMI, moist oral mucosa, no tongue and uvula deviation  NECK: Supple, ROM complete  Pul: Clear to auscultation bilaterally; No rales, rhonchi, wheezing, or rubs  CVS: +S1, +S2, Regular rate and rhythm; No murmurs, rubs, or gallops  GI: Soft, Nontender, Nondistended; Bowel sounds present  EXT:  2+ Peripheral Pulses, No clubbing, cyanosis, or edema  Neuro:  Alert & Oriented X4, Good concentration; Decreased Rt hand > Lt hand strength, Motor Strength 4/5 Rt upper and lower extremities, 5/5 LT upper and lower extremities; DTRs 2+ Rt brachial, sensory intact, no CN defecit  SKIN: No rashes or lesions    LABS:                        12.0   8.67  )-----------( 308      ( 17 Mar 2020 16:05 )             36.8     -    138  |  101  |  11.0  ----------------------------<  91  3.6   |  26.0  |  0.62    Ca    9.4      17 Mar 2020 16:05    TPro  7.3  /  Alb  4.1  /  TBili  0.5  /  DBili  x   /  AST  19  /  ALT  20  /  AlkPhos  64  -    PT/INR - ( 17 Mar 2020 16:05 )   PT: 11.1 sec;   INR: 0.98 ratio      PTT - ( 17 Mar 2020 16:05 )  PTT:32.9 sec  Urinalysis Basic - ( 17 Mar 2020 16:57 )    Color: Yellow / Appearance: Clear / S.015 / pH: x  Gluc: x / Ketone: Negative  / Bili: Negative / Urobili: Negative mg/dL   Blood: x / Protein: Negative mg/dL / Nitrite: Negative   Leuk Esterase: Negative / RBC: x / WBC x   Sq Epi: x / Non Sq Epi: x / Bacteria: x      RADIOLOGY & ADDITIONAL TESTS:  < from: TTE Echo Complete w/ Contrast w/ Doppler (20 @ 10:13) >  Summary:   1. Left ventricular ejection fraction, by visual estimation, is 55 to 60%.   2. Normal global left ventricular systolic function.   3. Mildly increased LV wall thickness.   4. There is no evidence of pericardial effusion.   5. Endocardial visualization was enhanced with intravenous echo contrast.    < end of copied text >      < from: CT Head No Cont (20 @ 15:43) >  FINDINGS:  There is periventricular and subcortical white matter hypodensity without mass effect, nonspecific, likely representing mild chronic microvascular ischemic changes. There is no compelling evidence for an acute transcortical infarction. There is no evidence of mass, mass effect, midline shift or extra-axial fluid collection. The lateral ventricles and cortical sulci are age-appropriate in size and configuration. Mild inflammatory mucosal changes are seen throughout thevarious portions of the paranasal sinuses. The orbits and mastoid air cells are unremarkable. The calvarium is intact.    IMPRESSION:  Mild chronic microvascular changes without evidence of an acute transcortical infarction or hemorrhage. MR is a more sensitive imaging modality for the evaluation of an acute infarction.     < end of copied text >    < from: MR Head No Cont (20 @ 23:30) >  IMPRESSION:     Few T2/FLAIR hyperintense foci in the subcortical, periventricular, and deep white matter, nonspecific finding, which may represent sequelae of microangiopathic, postinflammatory/postinfectious, or demyelinating etiology. Clinical correlation is advised.    < end of copied text >    < from: MR Cervical Spine w/wo IV Cont (20 @ 12:13) >  IMPRESSION:     Enhancing T2/STIR hyperintense intramedullary lesion in the right aspect of the spinal cord at C1-C2 level measuring 9 x 4 mm. This finding is nonspecific and may represent neoplastic, demyelinating and/or infectious pathology. Clinical correlation is advised.    < end of copied text >    pending ECHO read    MEDICATIONS  (STANDING):  atorvastatin 20 milliGRAM(s) Oral at bedtime  cyanocobalamin Injectable 1000 MICROGram(s) IntraMuscular daily  methylPREDNISolone sodium succinate IVPB 1000 milliGRAM(s) IV Intermittent daily

## 2020-03-20 LAB
ALBUMIN CSF-MCNC: 9.6 MG/DL — LOW (ref 14–25)
ALBUMIN SERPL ELPH-MCNC: 4404 MG/DL — SIGNIFICANT CHANGE UP (ref 3500–5200)
ANION GAP SERPL CALC-SCNC: 15 MMOL/L — SIGNIFICANT CHANGE UP (ref 5–17)
B BURGDOR C6 AB SER-ACNC: NEGATIVE — SIGNIFICANT CHANGE UP
B BURGDOR IGG+IGM SER-ACNC: 0.12 INDEX — SIGNIFICANT CHANGE UP (ref 0.01–0.89)
BASOPHILS # BLD AUTO: 0.02 K/UL — SIGNIFICANT CHANGE UP (ref 0–0.2)
BASOPHILS NFR BLD AUTO: 0.1 % — SIGNIFICANT CHANGE UP (ref 0–2)
BUN SERPL-MCNC: 13 MG/DL — SIGNIFICANT CHANGE UP (ref 8–20)
CALCIUM SERPL-MCNC: 9.7 MG/DL — SIGNIFICANT CHANGE UP (ref 8.6–10.2)
CHLORIDE SERPL-SCNC: 98 MMOL/L — SIGNIFICANT CHANGE UP (ref 98–107)
CO2 SERPL-SCNC: 24 MMOL/L — SIGNIFICANT CHANGE UP (ref 22–29)
CREAT SERPL-MCNC: 0.61 MG/DL — SIGNIFICANT CHANGE UP (ref 0.5–1.3)
CSF PCR RESULT: SIGNIFICANT CHANGE UP
EOSINOPHIL # BLD AUTO: 0 K/UL — SIGNIFICANT CHANGE UP (ref 0–0.5)
EOSINOPHIL NFR BLD AUTO: 0 % — SIGNIFICANT CHANGE UP (ref 0–6)
GLUCOSE SERPL-MCNC: 168 MG/DL — HIGH (ref 70–99)
GRAM STN FLD: SIGNIFICANT CHANGE UP
HCT VFR BLD CALC: 40 % — SIGNIFICANT CHANGE UP (ref 34.5–45)
HGB BLD-MCNC: 12.9 G/DL — SIGNIFICANT CHANGE UP (ref 11.5–15.5)
IGG CSF-MCNC: 4.3 MG/DL — HIGH
IGG FLD-MCNC: 1553 MG/DL — SIGNIFICANT CHANGE UP (ref 610–1660)
IGG SYNTH RATE SER+CSF CALC-MRATE: 7.7 MG/DAY — SIGNIFICANT CHANGE UP
IGG/ALB CLEAR SER+CSF-RTO: 1.3 — HIGH
IGG/ALB CSF: 0.45 RATIO — HIGH
IGG/ALB SER: 0.35 RATIO — SIGNIFICANT CHANGE UP
IMM GRANULOCYTES NFR BLD AUTO: 0.6 % — SIGNIFICANT CHANGE UP (ref 0–1.5)
LYMPHOCYTES # BLD AUTO: 1.11 K/UL — SIGNIFICANT CHANGE UP (ref 1–3.3)
LYMPHOCYTES # BLD AUTO: 5.4 % — LOW (ref 13–44)
MCHC RBC-ENTMCNC: 31.8 PG — SIGNIFICANT CHANGE UP (ref 27–34)
MCHC RBC-ENTMCNC: 32.3 GM/DL — SIGNIFICANT CHANGE UP (ref 32–36)
MCV RBC AUTO: 98.5 FL — SIGNIFICANT CHANGE UP (ref 80–100)
MONOCYTES # BLD AUTO: 1 K/UL — HIGH (ref 0–0.9)
MONOCYTES NFR BLD AUTO: 4.9 % — SIGNIFICANT CHANGE UP (ref 2–14)
NEUTROPHILS # BLD AUTO: 18.14 K/UL — HIGH (ref 1.8–7.4)
NEUTROPHILS NFR BLD AUTO: 89 % — HIGH (ref 43–77)
PLATELET # BLD AUTO: 190 K/UL — SIGNIFICANT CHANGE UP (ref 150–400)
POTASSIUM SERPL-MCNC: 4.1 MMOL/L — SIGNIFICANT CHANGE UP (ref 3.5–5.3)
POTASSIUM SERPL-SCNC: 4.1 MMOL/L — SIGNIFICANT CHANGE UP (ref 3.5–5.3)
RBC # BLD: 4.06 M/UL — SIGNIFICANT CHANGE UP (ref 3.8–5.2)
RBC # FLD: 13 % — SIGNIFICANT CHANGE UP (ref 10.3–14.5)
SODIUM SERPL-SCNC: 137 MMOL/L — SIGNIFICANT CHANGE UP (ref 135–145)
SPECIMEN SOURCE: SIGNIFICANT CHANGE UP
WBC # BLD: 20.39 K/UL — HIGH (ref 3.8–10.5)
WBC # FLD AUTO: 20.39 K/UL — HIGH (ref 3.8–10.5)

## 2020-03-20 PROCEDURE — 93971 EXTREMITY STUDY: CPT | Mod: 26,RT

## 2020-03-20 PROCEDURE — 72157 MRI CHEST SPINE W/O & W/DYE: CPT | Mod: 26

## 2020-03-20 PROCEDURE — 93971 EXTREMITY STUDY: CPT | Mod: 26,59,RT

## 2020-03-20 PROCEDURE — 99232 SBSQ HOSP IP/OBS MODERATE 35: CPT | Mod: GC

## 2020-03-20 RX ORDER — ONDANSETRON 8 MG/1
4 TABLET, FILM COATED ORAL ONCE
Refills: 0 | Status: COMPLETED | OUTPATIENT
Start: 2020-03-20 | End: 2020-03-20

## 2020-03-20 RX ORDER — AMLODIPINE BESYLATE 2.5 MG/1
10 TABLET ORAL DAILY
Refills: 0 | Status: DISCONTINUED | OUTPATIENT
Start: 2020-03-20 | End: 2020-03-22

## 2020-03-20 RX ORDER — ENOXAPARIN SODIUM 100 MG/ML
40 INJECTION SUBCUTANEOUS DAILY
Refills: 0 | Status: DISCONTINUED | OUTPATIENT
Start: 2020-03-20 | End: 2020-03-20

## 2020-03-20 RX ADMIN — AMLODIPINE BESYLATE 10 MILLIGRAM(S): 2.5 TABLET ORAL at 14:10

## 2020-03-20 RX ADMIN — Medication 650 MILLIGRAM(S): at 05:47

## 2020-03-20 RX ADMIN — TRAMADOL HYDROCHLORIDE 25 MILLIGRAM(S): 50 TABLET ORAL at 23:15

## 2020-03-20 RX ADMIN — Medication 58 MILLIGRAM(S): at 05:48

## 2020-03-20 RX ADMIN — PREGABALIN 1000 MICROGRAM(S): 225 CAPSULE ORAL at 14:09

## 2020-03-20 RX ADMIN — TRAMADOL HYDROCHLORIDE 50 MILLIGRAM(S): 50 TABLET ORAL at 01:53

## 2020-03-20 RX ADMIN — TRAMADOL HYDROCHLORIDE 25 MILLIGRAM(S): 50 TABLET ORAL at 08:20

## 2020-03-20 RX ADMIN — TRAMADOL HYDROCHLORIDE 50 MILLIGRAM(S): 50 TABLET ORAL at 02:50

## 2020-03-20 RX ADMIN — ONDANSETRON 4 MILLIGRAM(S): 8 TABLET, FILM COATED ORAL at 05:48

## 2020-03-20 RX ADMIN — ATORVASTATIN CALCIUM 20 MILLIGRAM(S): 80 TABLET, FILM COATED ORAL at 20:03

## 2020-03-20 RX ADMIN — TRAMADOL HYDROCHLORIDE 25 MILLIGRAM(S): 50 TABLET ORAL at 09:20

## 2020-03-20 RX ADMIN — Medication 650 MILLIGRAM(S): at 06:40

## 2020-03-20 RX ADMIN — TRAMADOL HYDROCHLORIDE 25 MILLIGRAM(S): 50 TABLET ORAL at 23:48

## 2020-03-20 NOTE — PROGRESS NOTE ADULT - ASSESSMENT
Impression:  Transverse myelitis    Plan:    MRI T spine with and without sofie to assess ms  IV Solumederol #3/5 today + PPI and then PO prednisone taper thereafter.  PT/OT.  LP performed by IR with only 6 cc obtained.  DVT prophylaxis recommended.  D/w pt in great detail of limitations and current test results  Will need outpt follow up with our MS clinic Dr Celaya/Alina for further eval and treatment after discharge.  Will follow.

## 2020-03-20 NOTE — PROGRESS NOTE ADULT - SUBJECTIVE AND OBJECTIVE BOX
INTERVAL HISTORY:  Pt seen and no new changes.    sulfa drugs (Rash)      VITAL SIGNS:  Vital Signs Last 24 Hrs  T(C): 36.7 (20 Mar 2020 07:55), Max: 37.2 (20 Mar 2020 00:10)  T(F): 98 (20 Mar 2020 07:55), Max: 98.9 (20 Mar 2020 00:10)  HR: 90 (20 Mar 2020 00:00) (88 - 94)  BP: 153/85 (20 Mar 2020 00:00) (135/80 - 153/85)  BP(mean): 101 (20 Mar 2020 00:00) (101 - 101)  RR: 18 (20 Mar 2020 00:00) (18 - 19)  SpO2: 98% (20 Mar 2020 00:00) (95% - 100%)    PHYSICAL EXAMINATION:  General: Well-developed, well nourished, in no acute distress.  Eyes: Conjunctiva and sclera clear.  Neurologic:  - Mental Status:  Alert, awake, oriented to person, place, and time; Speech is normal; Affect is normal.  - Cranial Nerves: II-XI intact.  - Motor:  Strength is 5/5 throughout L side and R side 4/5.  There is R sided pronator drift.  Normal muscle bulk and tone throughout.  - Reflexes:  2+ throughout; Plantars wd b/l.  - Sensory:  Dec to LT on R side  - Coordination:  + R sided dysmetria/dysdiadochokinesis.    MEDS:  MEDICATIONS  (STANDING):  atorvastatin 20 milliGRAM(s) Oral at bedtime  cyanocobalamin Injectable 1000 MICROGram(s) IntraMuscular daily  dextrose 5%. 1000 milliLiter(s) (50 mL/Hr) IV Continuous <Continuous>  dextrose 50% Injectable 12.5 Gram(s) IV Push once  dextrose 50% Injectable 25 Gram(s) IV Push once  dextrose 50% Injectable 25 Gram(s) IV Push once  methylPREDNISolone sodium succinate IVPB 1000 milliGRAM(s) IV Intermittent daily    MEDICATIONS  (PRN):  acetaminophen   Tablet .. 650 milliGRAM(s) Oral every 6 hours PRN Temp greater or equal to 38C (100.4F), Mild Pain (1 - 3)  dextrose 40% Gel 15 Gram(s) Oral once PRN Blood Glucose LESS THAN 70 milliGRAM(s)/deciLiter  glucagon  Injectable 1 milliGRAM(s) IntraMuscular once PRN Glucose <70 milliGRAM(s)/deciLiter  traMADol 50 milliGRAM(s) Oral every 8 hours PRN Severe Pain (7 - 10)  traMADol 25 milliGRAM(s) Oral every 8 hours PRN Moderate Pain (4 - 6)      LABS:                          12.9   20.39 )-----------( 190      ( 20 Mar 2020 08:15 )             40.0     03-20    137  |  98  |  13.0  ----------------------------<  168<H>  4.1   |  24.0  |  0.61    Ca    9.7      20 Mar 2020 09:36    TPro  x   /  Alb  4404  /  TBili  x   /  DBili  x   /  AST  x   /  ALT  x   /  AlkPhos  x   03-20    LIVER FUNCTIONS - ( 20 Mar 2020 02:01 )  Alb: 4404 mg/dL / Pro: x     / ALK PHOS: x     / ALT: x     / AST: x     / GGT: x           CSF Appearance: Clear (03-19-20 @ 16:17)  CSF Segmented Neutrophils: N/A % (03-19-20 @ 16:17)  CSF Color: No Color (03-19-20 @ 16:17)  Glucose, CSF: 101 mg/dLG/24h (03-19-20 @ 16:16)  Protein, CSF: 18 mg/dL (03-19-20 @ 16:16)      RADIOLOGY & ADDITIONAL STUDIES:      < from: MR Cervical Spine w/wo IV Cont (03.18.20 @ 12:13) >  Enhancing T2/STIR hyperintense intramedullary lesion in the right aspect of the spinal cord at C1-C2 level measuring 9 x 4 mm. This finding is nonspecific and may represent neoplastic, demyelinating and/or infectious pathology. Clinical correlation is advised.      < from: MR Head No Cont (03.17.20 @ 23:30) >  1. Abnormal increased T2 signal lesion 4 x 4 mm right posterior aspect of the   cervical cord at the C1 level, incompletely visualized on this exam. Correlate   clinically. Consider further evaluation with cervical spine MRI.   2. Several small high T2 and flair signal lesions in the deep white matter of   both hemispheres which are nonspecific. Possible etiologies includechanges   related to vasculitis or migraine headache, lyme disease, early small vessel   ischemic changes or foci of demyelination. Correlate clinically.      < from: CT Angio Neck w/ IV Cont (03.17.20 @ 17:37) >    CTA HEAD: No vessel narrowing or occlusion in the neck.    CTA NECK: No evidence of stenosis or occlusion in the [place].      < from: CT Head No Cont (03.17.20 @ 15:43) >  Mild chronic microvascular changes without evidence of an acute transcortical infarction or hemorrhage. MR is a more sensitive imaging modality for the evaluation of an acute infarction.

## 2020-03-20 NOTE — PROGRESS NOTE ADULT - ASSESSMENT
44 y/o female with PMH of HTN presenting to ER with Rt facial, Rt UE and LE weakness and numbness, NIH on admission 3 admitted for r/o CVA vs myelitis/demyelinating lesions. CT head negative for CVA/ICH. MR head few T2/FLAIR hyperintese foci in subcortical, periventricular and deep white matter non specific finding microangiopathic, postinflammatory/postinfectious or demyelinating etiology. MR C-spine enhancing T2/STIR hyperintense intramedullary lesion in Rt aspect of C1-C2 9x4mm, non specific lesion. LP at bedside unsucessful s/p IR LP on 3/19. MR T-spine no cord compression. Patient with possible myelitis started on solumedrol 1gm x 5 days. Neurology consulted.    Rt UE and LE weakness 2/2 possible myelitis vs demyelinating syndrome , acute CVA ruled out  - CT head negative for CVA/ICH.  - MR head. No acute infarction, ICH, mass. few T2/FLAIR hyperintese foci in subcortical, periventricular and deep white matter non specific finding microangiopathic, postinflammatory/postinfectious or demyelinating etiology.   - MR C-spine enhancing T2/STIR hyperintense intramedullary lesion in Rt aspect of C1-C2 9x4mm, non specific lesion.\\  - MR Thoracic spine w/ IV contrast - no cord abnormalities  - s/p bedside LP unsuccessful - 3/19 IR LP - CSF studies noted, awaiting oligoclonal bands result  - resumed lovenox. will d/c ASA as acute CVA ruled out  - s/p  mg x 1 in ED  - Echo: EF 55-60%  - c/w solumedrol 1gm x 5 days (d#3) per neurology; will transition to PO Prednisone taper decrease by 10mg q3 days  - PT/OT eval: outpatient PT  - Bedside dysphagia screen pass  - Tylenol PRN, Tramadol PRN for pain  - q6 neurochecks  - Ambulate as tolerated  - Neurology consulted and recs appreciated    Rt Superficial non-occlusive Cephalic vein thrombus  - no AC needed as its superficial  - f/u LE Rt doppler to r/o DVT as pt is concerned for LE swelling     HTN  - BPs in 140-150's  - started home norvasc 10 mg POD  - if repeat BPs elevated, will start home metoprolol    Advance directive: full code    DVT PPX: lovenox     Disposition: home when medically stable. home w/ home PT. 44 y/o female with PMH of HTN presenting to ER with Rt facial, Rt UE and LE weakness and numbness, NIH on admission 3 admitted for r/o CVA vs myelitis/demyelinating lesions. CT head negative for CVA/ICH. MR head few T2/FLAIR hyperintese foci in subcortical, periventricular and deep white matter non specific finding microangiopathic, postinflammatory/postinfectious or demyelinating etiology. MR C-spine enhancing T2/STIR hyperintense intramedullary lesion in Rt aspect of C1-C2 9x4mm, non specific lesion. LP at bedside unsucessful s/p IR LP on 3/19. MR T-spine no cord compression. Patient with possible myelitis started on solumedrol 1gm x 5 days. Neurology consulted.    Rt UE and LE weakness 2/2 possible myelitis vs demyelinating syndrome , acute CVA ruled out  - CT head negative for CVA/ICH.  - MR head. No acute infarction, ICH, mass. few T2/FLAIR hyperintese foci in subcortical, periventricular and deep white matter non specific finding microangiopathic, postinflammatory/postinfectious or demyelinating etiology.   - MR C-spine enhancing T2/STIR hyperintense intramedullary lesion in Rt aspect of C1-C2 9x4mm, non specific lesion.\\  - MR Thoracic spine w/ IV contrast - no cord abnormalities  - s/p bedside LP unsuccessful - 3/19 IR LP - CSF studies noted, awaiting oligoclonal bands result  - resumed lovenox. will d/c ASA as acute CVA ruled out  - s/p  mg x 1 in ED  - Echo: EF 55-60%  - c/w solumedrol 1gm x 5 days (d#3) per neurology; will transition to PO Prednisone taper decrease by 10mg q3 days  - PT/OT eval: outpatient PT  - Bedside dysphagia screen pass  - Tylenol PRN, Tramadol PRN for pain  - q6 neurochecks  - Ambulate as tolerated  - Neurology consulted and recs appreciated: will follow up with Dr Celaya[facility].    Rt Superficial non-occlusive Cephalic vein thrombus  - no AC needed as its superficial  - f/u LE Rt doppler to r/o DVT as pt is concerned for LE swelling     HTN  - BPs in 140-150's  - started home norvasc 10 mg POD  - if repeat BPs elevated, will start home metoprolol    Advance directive: full code    DVT PPX: lovenox     Disposition: home when medically stable. home w/ home PT. 44 y/o female with PMH of HTN presenting to ER with Rt facial, Rt UE and LE weakness and numbness, NIH on admission 3 admitted for r/o CVA vs myelitis/demyelinating lesions. CT head negative for CVA/ICH. MR head few T2/FLAIR hyperintese foci in subcortical, periventricular and deep white matter non specific finding microangiopathic, postinflammatory/postinfectious or demyelinating etiology. MR C-spine enhancing T2/STIR hyperintense intramedullary lesion in Rt aspect of C1-C2 9x4mm, non specific lesion. LP at bedside unsucessful s/p IR LP on 3/19. MR T-spine no cord compression. Patient with possible myelitis started on solumedrol 1gm x 5 days. Neurology consulted.    Rt UE and LE weakness 2/2 possible myelitis vs demyelinating syndrome , acute CVA ruled out  - CT head negative for CVA/ICH.  - MR head. No acute infarction, ICH, mass. few T2/FLAIR hyperintese foci in subcortical, periventricular and deep white matter non specific finding microangiopathic, postinflammatory/postinfectious or demyelinating etiology.   - MR C-spine enhancing T2/STIR hyperintense intramedullary lesion in Rt aspect of C1-C2 9x4mm, non specific lesion.\\  - MR Thoracic spine w/ IV contrast - no cord abnormalities  - s/p bedside LP unsuccessful - 3/19 IR LP - CSF studies noted, awaiting oligoclonal bands result  - resumed lovenox. will d/c ASA as acute CVA ruled out  - s/p  mg x 1 in ED  - Echo: EF 55-60%  - c/w solumedrol 1gm x 5 days (d#3) per neurology; will transition to PO Prednisone taper decrease by 10mg q3 days  - PT/OT eval: outpatient PT  - Bedside dysphagia screen pass  - Tylenol PRN, Tramadol PRN for pain  - q6 neurochecks  - Ambulate as tolerated  - Neurology consulted and recs appreciated: will follow up with Dr Celaya[facility].    Rt Superficial non-occlusive Cephalic vein thrombus  - no AC needed as its superficial  - f/u LE Rt doppler to r/o DVT as pt is concerned for LE swelling     HTN  - BPs in 140-150's  - started home norvasc 10 mg POD  - if repeat BPs elevated, will start home metoprolol    Advance directive: full code    DVT PPX: lovenox started but pt refused, she would prefer VCD boots + ambulation    Disposition: home when medically stable. home w/ home PT. 46 y/o female with PMH of HTN presenting to ER with Rt facial, Rt UE and LE weakness and numbness, NIH on admission 3 admitted for r/o CVA vs myelitis/demyelinating lesions. CT head negative for CVA/ICH. MR head few T2/FLAIR hyperintese foci in subcortical, periventricular and deep white matter non specific finding microangiopathic, postinflammatory/postinfectious or demyelinating etiology. MR C-spine enhancing T2/STIR hyperintense intramedullary lesion in Rt aspect of C1-C2 9x4mm, non specific lesion. LP at bedside unsucessful s/p IR LP on 3/19. MR T-spine no cord compression. Patient with possible myelitis started on solumedrol 1gm x 5 days. Neurology consulted.    Rt UE and LE weakness 2/2 possible myelitis vs demyelinating syndrome , acute CVA ruled out  - CT head negative for CVA/ICH.  - MR head. No acute infarction, ICH, mass. few T2/FLAIR hyperintese foci in subcortical, periventricular and deep white matter non specific finding microangiopathic, postinflammatory/postinfectious or demyelinating etiology.   - MR C-spine enhancing T2/STIR hyperintense intramedullary lesion in Rt aspect of C1-C2 9x4mm, non specific lesion.\\  - MR Thoracic spine w/ IV contrast - no cord abnormalities  - s/p bedside LP unsuccessful - 3/19 IR LP - CSF studies noted, awaiting oligoclonal bands result  - resumed lovenox. will d/c ASA as acute CVA ruled out  - s/p  mg x 1 in ED  - Echo: EF 55-60%  - c/w solumedrol 1gm x 5 days (d#3) per neurology; will transition to PO Prednisone taper decrease by 10mg q3 days  - PT/OT eval: outpatient PT  - Bedside dysphagia screen pass  - Tylenol PRN, Tramadol PRN for pain  - q6 neurochecks  - Ambulate as tolerated  - Neurology consulted and recs appreciated: will follow up with Dr Celaya[facility].    Rt Superficial non-occlusive Cephalic vein thrombus  - no AC needed as its superficial  - f/u LE Rt doppler to r/o DVT as pt is concerned for LE swelling     HTN  - BPs in 140-150's  - started home norvasc 10 mg POD  - if repeat BPs elevated, will start home metoprolol    Leukocytosis  - 2/2 steroids  - no s/s of infection    Advance directive: full code    DVT PPX: lovenox started but pt refused, she would prefer VCD boots + ambulation    Disposition: home when medically stable. home w/ home PT.

## 2020-03-20 NOTE — PROGRESS NOTE ADULT - SUBJECTIVE AND OBJECTIVE BOX
Patient is a 45y old  Female who presents with a chief complaint of CVA (18 Mar 2020 15:25)    INTERVAL HPI/OVERNIGHT EVENTS:  Patient seen and examined at bedside. This AM pt states she still feels the same, her Rt side feels heavy, minimal improvement. Pt states that when she touches her sole of Right foot, she has increased tingling sensations. Pt is able to walk with mild weakness. PT/OT evaluation, with outpatient PT. No FMHx of MS. Patient reports good appetite, tolerating PO intake, voiding. LP performed yesterday    ROS: Denies CP, SOB, MAYFIELD, abd pain, nausea/vomiting, diarrhea/constipation, fever chills, LE pain.    Vital Signs Last 24 Hrs  T(C): 36.8 (20 Mar 2020 16:20), Max: 37.2 (20 Mar 2020 00:10)  T(F): 98.2 (20 Mar 2020 16:20), Max: 98.9 (20 Mar 2020 00:10)  HR: 78 (20 Mar 2020 13:13) (78 - 94)  BP: 152/86 (20 Mar 2020 13:13) (148/74 - 153/85)  BP(mean): 102 (20 Mar 2020 13:13) (101 - 102)  RR: 20 (20 Mar 2020 13:13) (18 - 20)  SpO2: 100% (20 Mar 2020 13:13) (97% - 100%)    PHYSICAL EXAM:  GEN:  female, sitting in bed, well-groomed, well-developed, able to speak in full sentences  HEENT: NC/AT, clear sclera, PEERL, EOMI, moist oral mucosa, no tongue and uvula deviation  NECK: Supple, ROM complete  Pul: Clear to auscultation bilaterally; No rales, rhonchi, wheezing, or rubs  CVS: +S1, +S2, Regular rate and rhythm; No murmurs, rubs, or gallops  GI: Soft, Nontender, Nondistended; Bowel sounds present  EXT:  2+ Peripheral Pulses, No clubbing, cyanosis, or edema  Neuro:  Alert & Oriented X4, Good concentration; improving Rt hand > Lt hand strength, Motor Strength 4/5 Rt upper and lower extremities, 5/5 LT upper and lower extremities; DTRs 2+ Rt brachial, sensory intact, no CN defecit  SKIN: No rashes or lesions    LABS:                         12.9   20.39 )-----------( 190      ( 20 Mar 2020 08:15 )             40.0         137  |  98  |  13.0  ----------------------------<  168<H>  4.1   |  24.0  |  0.61    Ca    9.7      20 Mar 2020 09:36    TPro  x   /  Alb  4404  /  TBili  x   /  DBili  x   /  AST  x   /  ALT  x   /  AlkPhos  x       CSF IgG Index (20 @ 02:01)    Quantitative Ig mg/dL    Albumin, Serum: 4404 mg/dL    IgG CSF: 4.3 mg/dL    CSF ALBU: 9.6 mg/dL    IgG/Albumin Ratio, Serum: 0.35 Ratio    IgG/Albumin Ratio, CSF: 0.45 Ratio    IgG Index: 1.3    IgG Synthesis: 7.7 mg/day    pending oligoclonal bands CSF    RADIOLOGY & ADDITIONAL TESTS:  < from: US Duplex Venous Upper Ext Ltd, Right (20 @ 15:12) >  IMPRESSION:     Peripheral nonocclusive thrombus within the cephalic vein in the distal aspect of the upper arm (a superficial vein).    pending LE Rt doppler      < from: MR Thoracic Spine w/wo IV Cont (20 @ 11:55) >    IMPRESSION:     No abnormal cord signal or enhancement.    < end of copied text >      < from: TTE Echo Complete w/ Contrast w/ Doppler (20 @ 10:13) >  Summary:   1. Left ventricular ejection fraction, by visual estimation, is 55 to 60%.   2. Normal global left ventricular systolic function.   3. Mildly increased LV wall thickness.   4. There is no evidence of pericardial effusion.   5. Endocardial visualization was enhanced with intravenous echo contrast.    < end of copied text >      < from: CT Head No Cont (20 @ 15:43) >  FINDINGS:  There is periventricular and subcortical white matter hypodensity without mass effect, nonspecific, likely representing mild chronic microvascular ischemic changes. There is no compelling evidence for an acute transcortical infarction. There is no evidence of mass, mass effect, midline shift or extra-axial fluid collection. The lateral ventricles and cortical sulci are age-appropriate in size and configuration. Mild inflammatory mucosal changes are seen throughout thevarious portions of the paranasal sinuses. The orbits and mastoid air cells are unremarkable. The calvarium is intact.    IMPRESSION:  Mild chronic microvascular changes without evidence of an acute transcortical infarction or hemorrhage. MR is a more sensitive imaging modality for the evaluation of an acute infarction.     < end of copied text >    < from: MR Head No Cont (20 @ 23:30) >  IMPRESSION:     Few T2/FLAIR hyperintense foci in the subcortical, periventricular, and deep white matter, nonspecific finding, which may represent sequelae of microangiopathic, postinflammatory/postinfectious, or demyelinating etiology. Clinical correlation is advised.    < end of copied text >    < from: MR Cervical Spine w/wo IV Cont (20 @ 12:13) >  IMPRESSION:     Enhancing T2/STIR hyperintense intramedullary lesion in the right aspect of the spinal cord at C1-C2 level measuring 9 x 4 mm. This finding is nonspecific and may represent neoplastic, demyelinating and/or infectious pathology. Clinical correlation is advised.    < end of copied text >    < from: TTE Echo Complete w/ Contrast w/ Doppler (20 @ 10:13) >  Summary:   1. Left ventricular ejection fraction, by visual estimation, is 55 to 60%.   2. Normal global left ventricular systolic function.   3. Mildly increased LV wall thickness.   4. There is no evidence of pericardial effusion.   5. Endocardial visualization was enhanced with intravenous echo contrast.    < end of copied text >

## 2020-03-21 LAB
BASOPHILS # BLD AUTO: 0.02 K/UL — SIGNIFICANT CHANGE UP (ref 0–0.2)
BASOPHILS NFR BLD AUTO: 0.1 % — SIGNIFICANT CHANGE UP (ref 0–2)
DSDNA AB SER QL CLIF: NEGATIVE — SIGNIFICANT CHANGE UP
EOSINOPHIL # BLD AUTO: 0 K/UL — SIGNIFICANT CHANGE UP (ref 0–0.5)
EOSINOPHIL NFR BLD AUTO: 0 % — SIGNIFICANT CHANGE UP (ref 0–6)
HCT VFR BLD CALC: 35.5 % — SIGNIFICANT CHANGE UP (ref 34.5–45)
HGB BLD-MCNC: 11.4 G/DL — LOW (ref 11.5–15.5)
IMM GRANULOCYTES NFR BLD AUTO: 1.2 % — SIGNIFICANT CHANGE UP (ref 0–1.5)
LYMPHOCYTES # BLD AUTO: 1.54 K/UL — SIGNIFICANT CHANGE UP (ref 1–3.3)
LYMPHOCYTES # BLD AUTO: 8.5 % — LOW (ref 13–44)
MCHC RBC-ENTMCNC: 31.8 PG — SIGNIFICANT CHANGE UP (ref 27–34)
MCHC RBC-ENTMCNC: 32.1 GM/DL — SIGNIFICANT CHANGE UP (ref 32–36)
MCV RBC AUTO: 99.2 FL — SIGNIFICANT CHANGE UP (ref 80–100)
METHYLMALONATE SERPL-SCNC: 74 NMOL/L — SIGNIFICANT CHANGE UP (ref 0–378)
MONOCYTES # BLD AUTO: 1.3 K/UL — HIGH (ref 0–0.9)
MONOCYTES NFR BLD AUTO: 7.2 % — SIGNIFICANT CHANGE UP (ref 2–14)
NEUTROPHILS # BLD AUTO: 15.02 K/UL — HIGH (ref 1.8–7.4)
NEUTROPHILS NFR BLD AUTO: 83 % — HIGH (ref 43–77)
PLATELET # BLD AUTO: 275 K/UL — SIGNIFICANT CHANGE UP (ref 150–400)
RBC # BLD: 3.58 M/UL — LOW (ref 3.8–5.2)
RBC # FLD: 13.2 % — SIGNIFICANT CHANGE UP (ref 10.3–14.5)
WBC # BLD: 18.09 K/UL — HIGH (ref 3.8–10.5)
WBC # FLD AUTO: 18.09 K/UL — HIGH (ref 3.8–10.5)

## 2020-03-21 PROCEDURE — 99232 SBSQ HOSP IP/OBS MODERATE 35: CPT | Mod: GC

## 2020-03-21 RX ORDER — METOPROLOL TARTRATE 50 MG
25 TABLET ORAL DAILY
Refills: 0 | Status: DISCONTINUED | OUTPATIENT
Start: 2020-03-21 | End: 2020-03-22

## 2020-03-21 RX ORDER — PANTOPRAZOLE SODIUM 20 MG/1
40 TABLET, DELAYED RELEASE ORAL
Refills: 0 | Status: DISCONTINUED | OUTPATIENT
Start: 2020-03-21 | End: 2020-03-22

## 2020-03-21 RX ADMIN — AMLODIPINE BESYLATE 10 MILLIGRAM(S): 2.5 TABLET ORAL at 06:05

## 2020-03-21 RX ADMIN — Medication 25 MILLIGRAM(S): at 18:05

## 2020-03-21 RX ADMIN — TRAMADOL HYDROCHLORIDE 25 MILLIGRAM(S): 50 TABLET ORAL at 18:51

## 2020-03-21 RX ADMIN — Medication 650 MILLIGRAM(S): at 06:05

## 2020-03-21 RX ADMIN — TRAMADOL HYDROCHLORIDE 25 MILLIGRAM(S): 50 TABLET ORAL at 10:31

## 2020-03-21 RX ADMIN — Medication 58 MILLIGRAM(S): at 06:05

## 2020-03-21 RX ADMIN — ATORVASTATIN CALCIUM 20 MILLIGRAM(S): 80 TABLET, FILM COATED ORAL at 21:29

## 2020-03-21 NOTE — PROGRESS NOTE ADULT - ASSESSMENT
44 y/o female with PMH of HTN presenting to ER with Rt facial, Rt UE and LE weakness and numbness, NIH on admission 3 admitted for r/o CVA vs myelitis/demyelinating lesions. CT head negative for CVA/ICH. MR head few T2/FLAIR hyperintese foci in subcortical, periventricular and deep white matter non specific finding microangiopathic, postinflammatory/postinfectious or demyelinating etiology. MR C-spine enhancing T2/STIR hyperintense intramedullary lesion in Rt aspect of C1-C2 9x4mm, non specific lesion. LP at bedside unsucessful s/p IR LP on 3/19. MR T-spine no cord compression. Patient with possible myelitis started on solumedrol 1gm x 5 days. Currently on high dose steroids pending CSF studies to be followed up outpt. Neurology consulted.    Rt UE and LE weakness 2/2 possible myelitis vs demyelinating syndrome , acute CVA ruled out  - CT head negative for CVA/ICH.  - MR head. No acute infarction, ICH, mass. few T2/FLAIR hyperintense foci in subcortical, periventricular and deep white matter non specific finding microangiopathic, postinflammatory/postinfectious or demyelinating etiology.   - MR C-spine enhancing T2/STIR hyperintense intramedullary lesion in Rt aspect of C1-C2 9x4mm, non specific lesion.  - MR Thoracic spine w/ IV contrast - no cord abnormalities  - s/p bedside LP unsuccessful - 3/19 IR LP - CSF studies noted, awaiting oligoclonal bands result. Likely f/u outpt.  - c/w lovenox  - s/p  mg x 1 in ED, d/c as acute CVA ruled out  - Echo: EF 55-60%  - c/w solumedrol 1gm x 5 days (d#4) per neurology; will transition to PO Prednisone taper decrease by 10mg q3 days  - PT/OT eval: outpatient PT  - Bedside dysphagia screen pass  - Tylenol PRN, Tramadol PRN for pain  - q6 neurochecks  - Ambulate as tolerated  - R LE with decreased sensation, numbness, tingling. Will monitor improvement on steroids. Consider gabapentin after 7-8 days of steroids.  - Neurology consulted and recs appreciated: will follow up with Dr Celaya[facility].    Rt Superficial non-occlusive Cephalic vein thrombus   - no AC needed as its superficial  - LE Rt doppler negative for DVT     HTN  - BPs in 140-150's  - c/w home norvasc 10 mg POD  - if repeat BPs elevated, will start home metoprolol    Leukocytosis  - 2/2 steroids  - no s/s of infection    Advance directive: full code    DVT PPX: lovenox started but pt refused, she would prefer VCD boots + ambulation    Disposition: home when medically stable. home w/ home PT. 46 y/o female with PMH of HTN presenting to ER with Rt facial, Rt UE and LE weakness and numbness, NIH on admission 3 admitted for r/o CVA vs myelitis/demyelinating lesions. CT head negative for CVA/ICH. MR head few T2/FLAIR hyperintese foci in subcortical, periventricular and deep white matter non specific finding microangiopathic, postinflammatory/postinfectious or demyelinating etiology. MR C-spine enhancing T2/STIR hyperintense intramedullary lesion in Rt aspect of C1-C2 9x4mm, non specific lesion. LP at bedside unsucessful s/p IR LP on 3/19. MR T-spine no cord compression. Patient with possible myelitis started on solumedrol 1gm x 5 days. Currently on high dose steroids pending CSF studies to be followed up outpt. Neurology consulted.    Rt UE and LE weakness 2/2 possible myelitis vs demyelinating syndrome , acute CVA ruled out  - CT head negative for CVA/ICH.  - MR head. No acute infarction, ICH, mass. few T2/FLAIR hyperintense foci in subcortical, periventricular and deep white matter non specific finding microangiopathic, postinflammatory/postinfectious or demyelinating etiology.   - MR C-spine enhancing T2/STIR hyperintense intramedullary lesion in Rt aspect of C1-C2 9x4mm, non specific lesion.  - MR Thoracic spine w/ IV contrast - no cord abnormalities.  - s/p bedside LP unsuccessful - 3/19 IR LP - CSF studies noted, awaiting oligoclonal bands result. Likely f/u outpt.  - c/w lovenox  - s/p  mg x 1 in ED, d/c as acute CVA ruled out  - Echo: EF 55-60%  - c/w solumedrol 1gm x 5 days (d#4) per neurology; will transition to PO Prednisone taper decrease by 10mg q3 days on dc.  - PT/OT eval: outpatient PT  - Bedside dysphagia screen pass  - Tylenol PRN, Tramadol PRN for pain  - q6 neurochecks  - Ambulate as tolerated  - B12, folate, and TSH wnl.  - R LE with decreased sensation, numbness, tingling. Will monitor improvement on steroids. Consider gabapentin after 7-8 days of steroids.  - Neurology consulted and recs appreciated: will follow up with Dr Celaya/[facility].    Rt Superficial non-occlusive Cephalic vein thrombus   - no AC needed as its superficial  - LE Rt doppler negative for DVT     HTN  - BPs in 140-150's  - c/w home norvasc 10 mg POD  - restarted home metoprolol 25mg daily.    Leukocytosis  - 2/2 steroids  - no s/s of infection    Advance directive: full code    DVT PPX: lovenox refused, pt prefers VCD boots + ambulation    Disposition: dc in 24-48hrs. home w/ home PT. 44 y/o female with PMH of HTN presenting to ER with Rt facial, Rt UE and LE weakness and numbness, NIH on admission 3 admitted for r/o CVA vs myelitis/demyelinating lesions. CT head negative for CVA/ICH. MR head few T2/FLAIR hyperintese foci in subcortical, periventricular and deep white matter non specific finding microangiopathic, postinflammatory/postinfectious or demyelinating etiology. MR C-spine enhancing T2/STIR hyperintense intramedullary lesion in Rt aspect of C1-C2 9x4mm, non specific lesion. LP at bedside unsucessful s/p IR LP on 3/19. MR T-spine no cord compression. Patient with possible myelitis started on solumedrol 1gm x 5 days. Currently on high dose steroids pending CSF studies to be followed up outpt. Neurology consulted.    Rt UE and LE weakness 2/2 possible myelitis vs demyelinating syndrome , acute CVA ruled out  - CT head negative for CVA/ICH.  - MR head. No acute infarction, ICH, mass. few T2/FLAIR hyperintense foci in subcortical, periventricular and deep white matter non specific finding microangiopathic, postinflammatory/postinfectious or demyelinating etiology.   - MR C-spine enhancing T2/STIR hyperintense intramedullary lesion in Rt aspect of C1-C2 9x4mm, non specific lesion.  - MR Thoracic spine w/ IV contrast - no cord abnormalities.  - s/p bedside LP unsuccessful - 3/19 IR LP - CSF studies noted, awaiting oligoclonal bands result. Likely f/u outpt.  - c/w lovenox  - s/p  mg x 1 in ED, d/c as acute CVA ruled out  - Echo: EF 55-60%  - c/w solumedrol 1gm x 5 days (d#4) per neurology; will transition to PO Prednisone taper decrease by 10mg q3 days on dc.  - PT/OT eval: outpatient PT  - Bedside dysphagia screen pass  - Tylenol PRN, Tramadol PRN for pain  - q6 neurochecks  - Ambulate as tolerated  - B12, folate, and TSH wnl.  - R LE with mildly decreased sensation, numbness, tingling. Will monitor improvement on steroids. Consider gabapentin after 7-8 days off steroids.  - Neurology consulted and recs appreciated: will follow up with Dr Celaya[facility].    Rt Superficial non-occlusive Cephalic vein thrombus   - no AC needed as it is an isolated superficial vein thrombosis  - LE Rt doppler negative for DVT     HTN  - BPs in 140-150's  - c/w home norvasc 10 mg POD  - restarted home metoprolol 25mg daily today    Leukocytosis  - 2/2 steroids  - no s/s of infection    Advance directive: full code    DVT PPX: lovenox refused, pt prefers VCD boots + ambulation    Disposition: dc in 24-48hrs. home w/ home PT. Patient is a 44 y/o female with PMH of HTN presenting to ER with Rt facial, Rt UE and LE weakness and numbness, NIH on admission 3 admitted for r/o CVA vs myelitis/demyelinating lesions. CT head negative for CVA/ICH. MR head few T2/FLAIR hyperintese foci in subcortical, periventricular and deep white matter non specific finding microangiopathic, postinflammatory/postinfectious or demyelinating etiology. MR C-spine enhancing T2/STIR hyperintense intramedullary lesion in Rt aspect of C1-C2 9x4mm, non specific lesion. LP at bedside unsucessful s/p IR LP on 3/19. MR T-spine no cord compression. Patient with possible myelitis started on solumedrol 1gm x 5 days. Currently on high dose steroids pending CSF studies to be followed up outpt. Neurology consulted. Patient c/o right sided swelling which is minimal but present slightly to our eyes. She feels that it is present and verbalized wanting to be taken seriously. She was found to have on the RUE Duplex a nonocclusive thrombus in the cephalic vein (superficial). She has also not had any improvement in her subjective symptoms since steroids have been started.     Rt UE and LE weakness 2/2 possible myelitis vs demyelinating syndrome , acute CVA ruled out  with leucocytosis likely 2/2 steroids   -MR head showing a few T2/FLAIR hyperintense foci in subcortical, periventricular. MR C spine shows enhancing T2/STIR hyperintense intramedullary lesion in Rt aspect of C1-C2 9x4mm, non specific lesion. MR Thoracic spine w/ IV contrast - no cord abnormalities.  - s/p bedside LP unsuccessful - 3/19 IR LP - CSF studies noted, awaiting oligoclonal bands result. Likely f/u outpt.  - c/w solumedrol 1gm x 5 days (d#4) per neurology; will transition to PO Prednisone taper decrease by 10mg q3 days on dc  -PPI  added   - PT/OT eval: outpatient PT  - Tylenol PRN, Tramadol PRN for pain  - B12, folate, and TSH wnl.  - R LE with mildly decreased sensation, numbness, tingling. Will monitor improvement on steroids. Consider gabapentin after 2-3 days off steroids.  - Neurology consulted and recs appreciated: will follow up with Dr Celaya/[facility]    Rt Superficial non-occlusive Cephalic vein thrombus   - no AC needed as it is an isolated superficial vein thrombosis  - LE Rt doppler negative for DVT     HTN  - BPs in 140-150's  - c/w home norvasc 10 mg POD  - restarted home metoprolol 25mg daily today    Advance directive: full code    DVT PPX: lovenox refused, pt prefers VCD boots + ambulation    Disposition: dc home in am. home w/ home PT.

## 2020-03-21 NOTE — PROGRESS NOTE ADULT - ASSESSMENT
Impression:  Transverse myelitis    Plan:    IV Solumederol #4/5 today + PPI and then PO prednisone taper thereafter.  PT/OT.  LP performed by IR with only 6 cc obtained.  DVT prophylaxis recommended.  D/w pt in great detail.  Will need outpt follow up with our MS clinic Dr Celaya/Alina for further eval and treatment after discharge [phone] at [facility].  Will follow.

## 2020-03-21 NOTE — PROGRESS NOTE ADULT - SUBJECTIVE AND OBJECTIVE BOX
Patient is a 45y old  Female who presents with a chief complaint of CVA (18 Mar 2020 15:25)    INTERVAL HPI/OVERNIGHT EVENTS:  Patient seen and examined at bedside. This AM pt states she still feels the same and does not feel improvement with steroids. States her right arm and leg look swollen. Doubts due solely to myelitis or MS. States Rt side feels heavy and has numbness and tingling in her R plantar aspect of foot. Agrees with plan to continue steroids and f/u oligoclonal band results. Pt is able to ambulate.Patient reports good appetite, tolerating PO intake, voiding.    ROS: Denies CP, SOB, MAYFIELD, abd pain, nausea/vomiting, diarrhea/constipation, fever chills, LE pain.    Vital Signs Last 24 Hrs  T(C): 36.9 (20 Mar 2020 23:49), Max: 36.9 (20 Mar 2020 20:01)  T(F): 98.4 (20 Mar 2020 23:49), Max: 98.5 (20 Mar 2020 20:01)  HR: 76 (21 Mar 2020 06:04) (70 - 93)  BP: 150/90 (21 Mar 2020 06:04) (136/77 - 151/81)  BP(mean): 96 (20 Mar 2020 20:01) (96 - 96)  RR: 18 (21 Mar 2020 06:04) (18 - 18)  SpO2: 98% (20 Mar 2020 23:49) (98% - 98%)    PHYSICAL EXAM:  GEN:  female, sitting in bed, well-groomed, well-developed, able to speak in full sentences  HEENT: NC/AT, clear sclera, PEERL, EOMI, moist oral mucosa, no tongue and uvula deviation  NECK: Supple, ROM complete  Pul: Clear to auscultation bilaterally; No rales, rhonchi, wheezing, or rubs  CVS: +S1, +S2, Regular rate and rhythm; No murmurs, rubs, or gallops  GI: Soft, Nontender, Nondistended; Bowel sounds present  EXT:  2+ Peripheral Pulses, No clubbing, cyanosis, or edema  Neuro:  Alert & Oriented X4, Good concentration; L hand strength > R hand strength, Motor Strength 4/5 Rt upper and lower extremities, 5/5 LT upper and lower extremities; DTRs 2+ Rt brachial, sensory intact, no CN defecit  SKIN: No rashes or lesions    LABS:                                             11.4   18.09 )-----------( 275      ( 21 Mar 2020 06:34 )             35.5         137  |  98  |  13.0  ----------------------------<  168<H>  4.1   |  24.0  |  0.61    Ca    9.7      20 Mar 2020 09:36    TPro  x   /  Alb  4404  /  TBili  x   /  DBili  x   /  AST  x   /  ALT  x   /  AlkPhos  x       CSF IgG Index (20 @ 02:01)    Quantitative Ig mg/dL    Albumin, Serum: 4404 mg/dL    IgG CSF: 4.3 mg/dL    CSF ALBU: 9.6 mg/dL    IgG/Albumin Ratio, Serum: 0.35 Ratio    IgG/Albumin Ratio, CSF: 0.45 Ratio    IgG Index: 1.3    IgG Synthesis: 7.7 mg/day    pending oligoclonal bands CSF    RADIOLOGY & ADDITIONAL TESTS:  < from: US Duplex Venous Upper Ext Ltd, Right (20 @ 15:12) >  IMPRESSION:     Peripheral nonocclusive thrombus within the cephalic vein in the distal aspect of the upper arm (a superficial vein).    R LE doppler negative      < from: MR Thoracic Spine w/wo IV Cont (20 @ 11:55) >    IMPRESSION:     No abnormal cord signal or enhancement.    < end of copied text >      < from: TTE Echo Complete w/ Contrast w/ Doppler (20 @ 10:13) >  Summary:   1. Left ventricular ejection fraction, by visual estimation, is 55 to 60%.   2. Normal global left ventricular systolic function.   3. Mildly increased LV wall thickness.   4. There is no evidence of pericardial effusion.   5. Endocardial visualization was enhanced with intravenous echo contrast.    < end of copied text >      < from: CT Head No Cont (20 @ 15:43) >  FINDINGS:  There is periventricular and subcortical white matter hypodensity without mass effect, nonspecific, likely representing mild chronic microvascular ischemic changes. There is no compelling evidence for an acute transcortical infarction. There is no evidence of mass, mass effect, midline shift or extra-axial fluid collection. The lateral ventricles and cortical sulci are age-appropriate in size and configuration. Mild inflammatory mucosal changes are seen throughout thevarious portions of the paranasal sinuses. The orbits and mastoid air cells are unremarkable. The calvarium is intact.    IMPRESSION:  Mild chronic microvascular changes without evidence of an acute transcortical infarction or hemorrhage. MR is a more sensitive imaging modality for the evaluation of an acute infarction.     < end of copied text >    < from: MR Head No Cont (20 @ 23:30) >  IMPRESSION:     Few T2/FLAIR hyperintense foci in the subcortical, periventricular, and deep white matter, nonspecific finding, which may represent sequelae of microangiopathic, postinflammatory/postinfectious, or demyelinating etiology. Clinical correlation is advised.    < end of copied text >    < from: MR Cervical Spine w/wo IV Cont (20 @ 12:13) >  IMPRESSION:     Enhancing T2/STIR hyperintense intramedullary lesion in the right aspect of the spinal cord at C1-C2 level measuring 9 x 4 mm. This finding is nonspecific and may represent neoplastic, demyelinating and/or infectious pathology. Clinical correlation is advised.    < end of copied text >    < from: TTE Echo Complete w/ Contrast w/ Doppler (20 @ 10:13) >  Summary:   1. Left ventricular ejection fraction, by visual estimation, is 55 to 60%.   2. Normal global left ventricular systolic function.   3. Mildly increased LV wall thickness.   4. There is no evidence of pericardial effusion.   5. Endocardial visualization was enhanced with intravenous echo contrast.    < end of copied text >    MEDICATIONS  (STANDING):  amLODIPine   Tablet 10 milliGRAM(s) Oral daily  atorvastatin 20 milliGRAM(s) Oral at bedtime  dextrose 5%. 1000 milliLiter(s) (50 mL/Hr) IV Continuous <Continuous>  dextrose 50% Injectable 12.5 Gram(s) IV Push once  dextrose 50% Injectable 25 Gram(s) IV Push once  dextrose 50% Injectable 25 Gram(s) IV Push once  methylPREDNISolone sodium succinate IVPB 1000 milliGRAM(s) IV Intermittent daily    MEDICATIONS  (PRN):  acetaminophen   Tablet .. 650 milliGRAM(s) Oral every 6 hours PRN Temp greater or equal to 38C (100.4F), Mild Pain (1 - 3)  dextrose 40% Gel 15 Gram(s) Oral once PRN Blood Glucose LESS THAN 70 milliGRAM(s)/deciLiter  glucagon  Injectable 1 milliGRAM(s) IntraMuscular once PRN Glucose <70 milliGRAM(s)/deciLiter  traMADol 50 milliGRAM(s) Oral every 8 hours PRN Severe Pain (7 - 10)  traMADol 25 milliGRAM(s) Oral every 8 hours PRN Moderate Pain (4 - 6) Patient is a 45y old  Female who presents with a chief complaint of CVA (18 Mar 2020 15:25)    INTERVAL HPI/OVERNIGHT EVENTS:  Patient seen and examined at bedside. This AM pt states she still feels the same and does not feel improvement with steroids. States her right arm and leg look swollen. Doubts due solely to myelitis or MS. States Rt arm and leg feels heavy and has numbness and tingling in her R  foot. Patients Right extremities circumferences measured and compared to the left. No difference in circumference of the legs, right arm is mildly larger than the left but this could be attributed to her being right hand dominant. No significant swelling seen. Agrees with plan to continue steroids and f/u oligoclonal band results. Pt is able to ambulate. Patient reports good appetite, tolerating PO intake, voiding.    ROS: Denies CP, SOB, MAYFIELD, abd pain, nausea/vomiting, diarrhea/constipation, fever chills, LE pain.    Vital Signs Last 24 Hrs  T(C): 36.9 (20 Mar 2020 23:49), Max: 36.9 (20 Mar 2020 20:01)  T(F): 98.4 (20 Mar 2020 23:49), Max: 98.5 (20 Mar 2020 20:01)  HR: 76 (21 Mar 2020 06:04) (70 - 93)  BP: 150/90 (21 Mar 2020 06:04) (136/77 - 151/81)  BP(mean): 96 (20 Mar 2020 20:01) (96 - 96)  RR: 18 (21 Mar 2020 06:04) (18 - 18)  SpO2: 98% (20 Mar 2020 23:49) (98% - 98%)    PHYSICAL EXAM:  GEN:  female, sitting in bed, well-groomed, well-developed, able to speak in full sentences  HEENT: NC/AT, clear sclera, PEERL, EOMI, moist oral mucosa, no tongue and uvula deviation  NECK: Supple, ROM complete  Pul: Clear to auscultation bilaterally; No rales, rhonchi, wheezing, or rubs  CVS: +S1, +S2, Regular rate and rhythm; No murmurs, rubs, or gallops  GI: Soft, Nontender, Nondistended; Bowel sounds present  EXT:  2+ Peripheral Pulses, No clubbing, cyanosis, or edema  Neuro:  Alert & Oriented X4, Good concentration; L hand strength > R hand strength, Motor Strength 4/5 Rt upper and lower extremities, 5/5 LT upper and lower extremities; DTRs 2+ Rt brachial, sensory intact, no CN defecit  SKIN: No rashes or lesions    LABS:                                             11.4   18.09 )-----------( 275      ( 21 Mar 2020 06:34 )             35.5         137  |  98  |  13.0  ----------------------------<  168<H>  4.1   |  24.0  |  0.61    Ca    9.7      20 Mar 2020 09:36    TPro  x   /  Alb  4404  /  TBili  x   /  DBili  x   /  AST  x   /  ALT  x   /  AlkPhos  x       CSF IgG Index (20 @ 02:01)    Quantitative Ig mg/dL    Albumin, Serum: 4404 mg/dL    IgG CSF: 4.3 mg/dL    CSF ALBU: 9.6 mg/dL    IgG/Albumin Ratio, Serum: 0.35 Ratio    IgG/Albumin Ratio, CSF: 0.45 Ratio    IgG Index: 1.3    IgG Synthesis: 7.7 mg/day    pending oligoclonal bands CSF    RADIOLOGY & ADDITIONAL TESTS:  < from: US Duplex Venous Upper Ext Ltd, Right (20 @ 15:12) >  IMPRESSION:     Peripheral nonocclusive thrombus within the cephalic vein in the distal aspect of the upper arm (a superficial vein).    R LE doppler negative      < from: MR Thoracic Spine w/wo IV Cont (20 @ 11:55) >    IMPRESSION:     No abnormal cord signal or enhancement.      < from: TTE Echo Complete w/ Contrast w/ Doppler (20 @ 10:13) >  Summary:   1. Left ventricular ejection fraction, by visual estimation, is 55 to 60%.   2. Normal global left ventricular systolic function.   3. Mildly increased LV wall thickness.   4. There is no evidence of pericardial effusion.   5. Endocardial visualization was enhanced with intravenous echo contrast.    < end of copied text >      < from: CT Head No Cont (20 @ 15:43) >  FINDINGS:  There is periventricular and subcortical white matter hypodensity without mass effect, nonspecific, likely representing mild chronic microvascular ischemic changes. There is no compelling evidence for an acute transcortical infarction. There is no evidence of mass, mass effect, midline shift or extra-axial fluid collection. The lateral ventricles and cortical sulci are age-appropriate in size and configuration. Mild inflammatory mucosal changes are seen throughout thevarious portions of the paranasal sinuses. The orbits and mastoid air cells are unremarkable. The calvarium is intact.    IMPRESSION:  Mild chronic microvascular changes without evidence of an acute transcortical infarction or hemorrhage. MR is a more sensitive imaging modality for the evaluation of an acute infarction.     < end of copied text >    < from: MR Head No Cont (20 @ 23:30) >  IMPRESSION:     Few T2/FLAIR hyperintense foci in the subcortical, periventricular, and deep white matter, nonspecific finding, which may represent sequelae of microangiopathic, postinflammatory/postinfectious, or demyelinating etiology. Clinical correlation is advised.    < end of copied text >    < from: MR Cervical Spine w/wo IV Cont (20 @ 12:13) >  IMPRESSION:     Enhancing T2/STIR hyperintense intramedullary lesion in the right aspect of the spinal cord at C1-C2 level measuring 9 x 4 mm. This finding is nonspecific and may represent neoplastic, demyelinating and/or infectious pathology. Clinical correlation is advised.    < end of copied text >    < from: TTE Echo Complete w/ Contrast w/ Doppler (20 @ 10:13) >  Summary:   1. Left ventricular ejection fraction, by visual estimation, is 55 to 60%.   2. Normal global left ventricular systolic function.   3. Mildly increased LV wall thickness.   4. There is no evidence of pericardial effusion.   5. Endocardial visualization was enhanced with intravenous echo contrast.    < end of copied text >    MEDICATIONS  (STANDING):  amLODIPine   Tablet 10 milliGRAM(s) Oral daily  atorvastatin 20 milliGRAM(s) Oral at bedtime  dextrose 5%. 1000 milliLiter(s) (50 mL/Hr) IV Continuous <Continuous>  dextrose 50% Injectable 12.5 Gram(s) IV Push once  dextrose 50% Injectable 25 Gram(s) IV Push once  dextrose 50% Injectable 25 Gram(s) IV Push once  methylPREDNISolone sodium succinate IVPB 1000 milliGRAM(s) IV Intermittent daily    MEDICATIONS  (PRN):  acetaminophen   Tablet .. 650 milliGRAM(s) Oral every 6 hours PRN Temp greater or equal to 38C (100.4F), Mild Pain (1 - 3)  dextrose 40% Gel 15 Gram(s) Oral once PRN Blood Glucose LESS THAN 70 milliGRAM(s)/deciLiter  glucagon  Injectable 1 milliGRAM(s) IntraMuscular once PRN Glucose <70 milliGRAM(s)/deciLiter  traMADol 50 milliGRAM(s) Oral every 8 hours PRN Severe Pain (7 - 10)  traMADol 25 milliGRAM(s) Oral every 8 hours PRN Moderate Pain (4 - 6) Patient is a 45y old  Female who presents with a chief complaint of CVA (18 Mar 2020 15:25)    INTERVAL HPI/OVERNIGHT EVENTS:  Patient seen and examined at bedside. This AM pt states she still feels the same and does not feel improvement with steroids. States her right arm and leg look swollen. Doubts due solely to myelitis or MS. States Rt arm and leg feels heavy and has numbness and tingling in her R  foot. Patients Right extremities circumferences measured and compared to the left. No difference in circumference of the legs, right arm is mildly larger than the left but this could be attributed to her being right hand dominant. No significant swelling seen. Agrees with plan to continue steroids and f/u oligoclonal band results. Pt is able to ambulate. Patient reports good appetite, tolerating PO intake, voiding.    ROS: Denies CP, SOB, MAFYIELD, abd pain, nausea/vomiting, diarrhea/constipation, fever chills, LE pain. All other ROS negative     Vital Signs Last 24 Hrs  T(C): 36.9 (20 Mar 2020 23:49), Max: 36.9 (20 Mar 2020 20:01)  T(F): 98.4 (20 Mar 2020 23:49), Max: 98.5 (20 Mar 2020 20:01)  HR: 76 (21 Mar 2020 06:04) (70 - 93)  BP: 150/90 (21 Mar 2020 06:04) (136/77 - 151/81)  BP(mean): 96 (20 Mar 2020 20:01) (96 - 96)  RR: 18 (21 Mar 2020 06:04) (18 - 18)  SpO2: 98% (20 Mar 2020 23:49) (98% - 98%)    PHYSICAL EXAM:  GEN:  female, sitting in bed, well-groomed, well-developed, able to speak in full sentences  HEENT: NC/AT, clear sclera, PEERL, EOMI, moist oral mucosa, no tongue and uvula deviation  Pulm: Clear to auscultation bilaterally; No rales, rhonchi, wheezing, or rubs  CVS: +S1, +S2, Regular rate and rhythm; No murmurs, rubs, or gallops  GI: Soft, Nontender, Nondistended; Bowel sounds present  EXT:  2+ Peripheral Pulses, No clubbing, cyanosis. Minimal edema to RLE and RUE. Measured LE - equal in diameter at gastrocnemius. Measured b/l mid humerus for UE and right UE slightly larger than left by about 0.5 inch  Neuro:  Alert & Oriented X4, Good concentration; L hand strength > R hand strength, Motor Strength 4/5 Rt upper and lower extremities, Right LLE motor strength is 4/5 also. 5/5 LT upper and lower extremities; DTRs 2+ Rt brachial, sensory intact, no CN deficit. Subjective numbness/tingling sensation to RLE   SKIN: No rashes or lesions    LABS:                                         11.4   18.09 )-----------( 275      ( 21 Mar 2020 06:34 )             35.5     03-    137  |  98  |  13.0  ----------------------------<  168<H>  4.1   |  24.0  |  0.61    Ca    9.7      20 Mar 2020 09:36    TPro  x   /  Alb  4404  /  TBili  x   /  DBili  x   /  AST  x   /  ALT  x   /  AlkPhos  x       CSF IgG Index (20 @ 02:01)    Quantitative Ig mg/dL    Albumin, Serum: 4404 mg/dL    IgG CSF: 4.3 mg/dL    CSF ALBU: 9.6 mg/dL    IgG/Albumin Ratio, Serum: 0.35 Ratio    IgG/Albumin Ratio, CSF: 0.45 Ratio    IgG Index: 1.3    IgG Synthesis: 7.7 mg/day    pending oligoclonal bands CSF    RADIOLOGY & ADDITIONAL TESTS:  < from: US Duplex Venous Upper Ext Ltd, Right (20 @ 15:12) >  IMPRESSION:     Peripheral nonocclusive thrombus within the cephalic vein in the distal aspect of the upper arm (a superficial vein).    R LE doppler negative      < from: MR Thoracic Spine w/wo IV Cont (20 @ 11:55) >    IMPRESSION:     No abnormal cord signal or enhancement.    < from: TTE Echo Complete w/ Contrast w/ Doppler (20 @ 10:13) >  Summary:   1. Left ventricular ejection fraction, by visual estimation, is 55 to 60%.   2. Normal global left ventricular systolic function.   3. Mildly increased LV wall thickness.   4. There is no evidence of pericardial effusion.   5. Endocardial visualization was enhanced with intravenous echo contrast.    < from: CT Head No Cont (20 @ 15:43) >    IMPRESSION:  Mild chronic microvascular changes without evidence of an acute transcortical infarction or hemorrhage. MR is a more sensitive imaging modality for the evaluation of an acute infarction.     < from: MR Head No Cont (20 @ 23:30) >  IMPRESSION:     Few T2/FLAIR hyperintense foci in the subcortical, periventricular, and deep white matter, nonspecific finding, which may represent sequelae of microangiopathic, postinflammatory/postinfectious, or demyelinating etiology. Clinical correlation is advised.      < from: MR Cervical Spine w/wo IV Cont (20 @ 12:13) >  IMPRESSION:     Enhancing T2/STIR hyperintense intramedullary lesion in the right aspect of the spinal cord at C1-C2 level measuring 9 x 4 mm. This finding is nonspecific and may represent neoplastic, demyelinating and/or infectious pathology. Clinical correlation is advised.    MEDICATIONS  (STANDING):  amLODIPine   Tablet 10 milliGRAM(s) Oral daily  atorvastatin 20 milliGRAM(s) Oral at bedtime  dextrose 5%. 1000 milliLiter(s) (50 mL/Hr) IV Continuous <Continuous>  dextrose 50% Injectable 12.5 Gram(s) IV Push once  dextrose 50% Injectable 25 Gram(s) IV Push once  dextrose 50% Injectable 25 Gram(s) IV Push once  methylPREDNISolone sodium succinate IVPB 1000 milliGRAM(s) IV Intermittent daily  metoprolol tartrate 25 milliGRAM(s) Oral daily  pantoprazole    Tablet 40 milliGRAM(s) Oral before breakfast    MEDICATIONS  (PRN):  acetaminophen   Tablet .. 650 milliGRAM(s) Oral every 6 hours PRN Temp greater or equal to 38C (100.4F), Mild Pain (1 - 3)  dextrose 40% Gel 15 Gram(s) Oral once PRN Blood Glucose LESS THAN 70 milliGRAM(s)/deciLiter  glucagon  Injectable 1 milliGRAM(s) IntraMuscular once PRN Glucose <70 milliGRAM(s)/deciLiter  traMADol 50 milliGRAM(s) Oral every 8 hours PRN Severe Pain (7 - 10)  traMADol 25 milliGRAM(s) Oral every 8 hours PRN Moderate Pain (4 - 6)

## 2020-03-21 NOTE — PROGRESS NOTE ADULT - SUBJECTIVE AND OBJECTIVE BOX
INTERVAL HISTORY:  Seen at bedside and no new changes.  Still with R sided weakness/incoordination.    sulfa drugs (Rash)      VITAL SIGNS:  Vital Signs Last 24 Hrs  T(C): 36.9 (20 Mar 2020 23:49), Max: 36.9 (20 Mar 2020 20:01)  T(F): 98.4 (20 Mar 2020 23:49), Max: 98.5 (20 Mar 2020 20:01)  HR: 76 (21 Mar 2020 06:04) (70 - 93)  BP: 150/90 (21 Mar 2020 06:04) (136/77 - 151/81)  BP(mean): 96 (20 Mar 2020 20:01) (96 - 96)  RR: 18 (21 Mar 2020 06:04) (18 - 18)  SpO2: 98% (20 Mar 2020 23:49) (98% - 98%)    PHYSICAL EXAMINATION:  General: Well-developed, well nourished, in no acute distress.  Eyes: Conjunctiva and sclera clear.  Neurologic:  - Mental Status:  Alert, awake, oriented to person, place, and time; Speech is normal; Affect is normal.  - Cranial Nerves: II-XI intact.  - Motor:  Strength is 5/5 throughout L side and R side 4/5.  There is R sided pronator drift.  Normal muscle bulk and tone throughout.  - Reflexes:  2+ throughout; Plantars wd b/l.  - Sensory:  Dec to LT on R side  - Coordination:  + R sided dysmetria/dysdiadochokinesis.    MEDS:  MEDICATIONS  (STANDING):  amLODIPine   Tablet 10 milliGRAM(s) Oral daily  atorvastatin 20 milliGRAM(s) Oral at bedtime  dextrose 5%. 1000 milliLiter(s) (50 mL/Hr) IV Continuous <Continuous>  dextrose 50% Injectable 12.5 Gram(s) IV Push once  dextrose 50% Injectable 25 Gram(s) IV Push once  dextrose 50% Injectable 25 Gram(s) IV Push once  methylPREDNISolone sodium succinate IVPB 1000 milliGRAM(s) IV Intermittent daily    MEDICATIONS  (PRN):  acetaminophen   Tablet .. 650 milliGRAM(s) Oral every 6 hours PRN Temp greater or equal to 38C (100.4F), Mild Pain (1 - 3)  dextrose 40% Gel 15 Gram(s) Oral once PRN Blood Glucose LESS THAN 70 milliGRAM(s)/deciLiter  glucagon  Injectable 1 milliGRAM(s) IntraMuscular once PRN Glucose <70 milliGRAM(s)/deciLiter  traMADol 50 milliGRAM(s) Oral every 8 hours PRN Severe Pain (7 - 10)  traMADol 25 milliGRAM(s) Oral every 8 hours PRN Moderate Pain (4 - 6)      LABS:                          11.4   18.09 )-----------( 275      ( 21 Mar 2020 06:34 )             35.5     03-20    137  |  98  |  13.0  ----------------------------<  168<H>  4.1   |  24.0  |  0.61    Ca    9.7      20 Mar 2020 09:36    TPro  x   /  Alb  4404  /  TBili  x   /  DBili  x   /  AST  x   /  ALT  x   /  AlkPhos  x   03-20    LIVER FUNCTIONS - ( 20 Mar 2020 02:01 )  Alb: 4404 mg/dL / Pro: x     / ALK PHOS: x     / ALT: x     / AST: x     / GGT: x               RADIOLOGY & ADDITIONAL STUDIES:      < from: MR Cervical Spine w/wo IV Cont (03.18.20 @ 12:13) >  Enhancing T2/STIR hyperintense intramedullary lesion in the right aspect of the spinal cord at C1-C2 level measuring 9 x 4 mm. This finding is nonspecific and may represent neoplastic, demyelinating and/or infectious pathology. Clinical correlation is advised.      < from: MR Head No Cont (03.17.20 @ 23:30) >  1. Abnormal increased T2 signal lesion 4 x 4 mm right posterior aspect of the   cervical cord at the C1 level, incompletely visualized on this exam. Correlate   clinically. Consider further evaluation with cervical spine MRI.   2. Several small high T2 and flair signal lesions in the deep white matter of   both hemispheres which are nonspecific. Possible etiologies includechanges   related to vasculitis or migraine headache, lyme disease, early small vessel   ischemic changes or foci of demyelination. Correlate clinically.      < from: CT Angio Neck w/ IV Cont (03.17.20 @ 17:37) >    CTA HEAD: No vessel narrowing or occlusion in the neck.    CTA NECK: No evidence of stenosis or occlusion in the [place].      < from: CT Head No Cont (03.17.20 @ 15:43) >  Mild chronic microvascular changes without evidence of an acute transcortical infarction or hemorrhage. MR is a more sensitive imaging modality for the evaluation of an acute infarction.     < from: MR Thoracic Spine w/wo IV Cont (03.20.20 @ 11:55) >  No abnormal cord signal or enhancement.

## 2020-03-21 NOTE — PROGRESS NOTE ADULT - ATTENDING COMMENTS
Senior Resident Addendum  Agree with the above note, note addended where needed. Patient seen and examined at bedside today with the teaching service. 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 Patient on high dose steroids, Day 4 of 5, with plan to DC on po steroid taper and outpatient follow in MS clinic. Patient complained of increased swelling on right extremities but no significant swelling appreciated on exam. Nonspecific numbness of her right foot, advised to get re-evaluated once steroids have completed and to assessed for treatment w/ Gabapentin.  Disposition: patient to be discharged tomorrow Senior Resident Addendum  Agree with the above note, note addended where needed. Patient seen and examined at bedside today with the teaching service. 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 Patient on high dose steroids, Day 4 of 5, with plan to DC on po steroid taper and outpatient follow in MS clinic. Patient complained of increased swelling on right extremities but no significant swelling appreciated on exam. Nonspecific numbness of her right foot, advised to get re-evaluated once steroids have completed and to assessed for treatment w/ Gabapentin. Disposition: patient to be discharged tomorrow    Attending Addendum: Note addended where needed. Plan discussed with patient at bedside. subjective minimal swelling likely due to the fact that she is right sided dominant, + numbness/tingling and decreased use.

## 2020-03-22 ENCOUNTER — TRANSCRIPTION ENCOUNTER (OUTPATIENT)
Age: 45
End: 2020-03-22

## 2020-03-22 VITALS
HEART RATE: 86 BPM | DIASTOLIC BLOOD PRESSURE: 76 MMHG | TEMPERATURE: 99 F | RESPIRATION RATE: 18 BRPM | OXYGEN SATURATION: 96 % | SYSTOLIC BLOOD PRESSURE: 138 MMHG

## 2020-03-22 DIAGNOSIS — I63.9 CEREBRAL INFARCTION, UNSPECIFIED: ICD-10-CM

## 2020-03-22 LAB
BASOPHILS # BLD AUTO: 0.02 K/UL — SIGNIFICANT CHANGE UP (ref 0–0.2)
BASOPHILS NFR BLD AUTO: 0.1 % — SIGNIFICANT CHANGE UP (ref 0–2)
CULTURE RESULTS: NO GROWTH — SIGNIFICANT CHANGE UP
EOSINOPHIL # BLD AUTO: 0 K/UL — SIGNIFICANT CHANGE UP (ref 0–0.5)
EOSINOPHIL NFR BLD AUTO: 0 % — SIGNIFICANT CHANGE UP (ref 0–6)
HCT VFR BLD CALC: 37 % — SIGNIFICANT CHANGE UP (ref 34.5–45)
HGB BLD-MCNC: 12.1 G/DL — SIGNIFICANT CHANGE UP (ref 11.5–15.5)
IMM GRANULOCYTES NFR BLD AUTO: 2 % — HIGH (ref 0–1.5)
LYMPHOCYTES # BLD AUTO: 1.42 K/UL — SIGNIFICANT CHANGE UP (ref 1–3.3)
LYMPHOCYTES # BLD AUTO: 8.5 % — LOW (ref 13–44)
MCHC RBC-ENTMCNC: 31.9 PG — SIGNIFICANT CHANGE UP (ref 27–34)
MCHC RBC-ENTMCNC: 32.7 GM/DL — SIGNIFICANT CHANGE UP (ref 32–36)
MCV RBC AUTO: 97.6 FL — SIGNIFICANT CHANGE UP (ref 80–100)
MONOCYTES # BLD AUTO: 1.27 K/UL — HIGH (ref 0–0.9)
MONOCYTES NFR BLD AUTO: 7.6 % — SIGNIFICANT CHANGE UP (ref 2–14)
NEUTROPHILS # BLD AUTO: 13.71 K/UL — HIGH (ref 1.8–7.4)
NEUTROPHILS NFR BLD AUTO: 81.8 % — HIGH (ref 43–77)
PLATELET # BLD AUTO: 282 K/UL — SIGNIFICANT CHANGE UP (ref 150–400)
PROT S FREE PPP-ACNC: 94 % NORMAL — SIGNIFICANT CHANGE UP (ref 60–140)
RBC # BLD: 3.79 M/UL — LOW (ref 3.8–5.2)
RBC # FLD: 12.9 % — SIGNIFICANT CHANGE UP (ref 10.3–14.5)
SPECIMEN SOURCE: SIGNIFICANT CHANGE UP
WBC # BLD: 16.75 K/UL — HIGH (ref 3.8–10.5)
WBC # FLD AUTO: 16.75 K/UL — HIGH (ref 3.8–10.5)

## 2020-03-22 PROCEDURE — 82945 GLUCOSE OTHER FLUID: CPT

## 2020-03-22 PROCEDURE — 70551 MRI BRAIN STEM W/O DYE: CPT

## 2020-03-22 PROCEDURE — 86780 TREPONEMA PALLIDUM: CPT

## 2020-03-22 PROCEDURE — 80053 COMPREHEN METABOLIC PANEL: CPT

## 2020-03-22 PROCEDURE — 85306 CLOT INHIBIT PROT S FREE: CPT

## 2020-03-22 PROCEDURE — 85610 PROTHROMBIN TIME: CPT

## 2020-03-22 PROCEDURE — 82607 VITAMIN B-12: CPT

## 2020-03-22 PROCEDURE — 86147 CARDIOLIPIN ANTIBODY EA IG: CPT

## 2020-03-22 PROCEDURE — 85300 ANTITHROMBIN III ACTIVITY: CPT

## 2020-03-22 PROCEDURE — 86618 LYME DISEASE ANTIBODY: CPT

## 2020-03-22 PROCEDURE — 99285 EMERGENCY DEPT VISIT HI MDM: CPT

## 2020-03-22 PROCEDURE — 80061 LIPID PANEL: CPT

## 2020-03-22 PROCEDURE — 36415 COLL VENOUS BLD VENIPUNCTURE: CPT

## 2020-03-22 PROCEDURE — C8929: CPT

## 2020-03-22 PROCEDURE — 84484 ASSAY OF TROPONIN QUANT: CPT

## 2020-03-22 PROCEDURE — 85027 COMPLETE CBC AUTOMATED: CPT

## 2020-03-22 PROCEDURE — 84157 ASSAY OF PROTEIN OTHER: CPT

## 2020-03-22 PROCEDURE — 72157 MRI CHEST SPINE W/O & W/DYE: CPT

## 2020-03-22 PROCEDURE — 87205 SMEAR GRAM STAIN: CPT

## 2020-03-22 PROCEDURE — 97167 OT EVAL HIGH COMPLEX 60 MIN: CPT

## 2020-03-22 PROCEDURE — 82962 GLUCOSE BLOOD TEST: CPT

## 2020-03-22 PROCEDURE — 93971 EXTREMITY STUDY: CPT

## 2020-03-22 PROCEDURE — 81003 URINALYSIS AUTO W/O SCOPE: CPT

## 2020-03-22 PROCEDURE — 86146 BETA-2 GLYCOPROTEIN ANTIBODY: CPT

## 2020-03-22 PROCEDURE — 70450 CT HEAD/BRAIN W/O DYE: CPT

## 2020-03-22 PROCEDURE — 85303 CLOT INHIBIT PROT C ACTIVITY: CPT

## 2020-03-22 PROCEDURE — 87483 CNS DNA AMP PROBE TYPE 12-25: CPT

## 2020-03-22 PROCEDURE — 77003 FLUOROGUIDE FOR SPINE INJECT: CPT

## 2020-03-22 PROCEDURE — 86617 LYME DISEASE ANTIBODY: CPT

## 2020-03-22 PROCEDURE — 80048 BASIC METABOLIC PNL TOTAL CA: CPT

## 2020-03-22 PROCEDURE — 85307 ASSAY ACTIVATED PROTEIN C: CPT

## 2020-03-22 PROCEDURE — 86255 FLUORESCENT ANTIBODY SCREEN: CPT

## 2020-03-22 PROCEDURE — 86235 NUCLEAR ANTIGEN ANTIBODY: CPT

## 2020-03-22 PROCEDURE — 70498 CT ANGIOGRAPHY NECK: CPT

## 2020-03-22 PROCEDURE — 87476 LYME DIS DNA AMP PROBE: CPT

## 2020-03-22 PROCEDURE — 84702 CHORIONIC GONADOTROPIN TEST: CPT

## 2020-03-22 PROCEDURE — 86038 ANTINUCLEAR ANTIBODIES: CPT

## 2020-03-22 PROCEDURE — 83921 ORGANIC ACID SINGLE QUANT: CPT

## 2020-03-22 PROCEDURE — 82746 ASSAY OF FOLIC ACID SERUM: CPT

## 2020-03-22 PROCEDURE — 99239 HOSP IP/OBS DSCHRG MGMT >30: CPT | Mod: GC

## 2020-03-22 PROCEDURE — 83036 HEMOGLOBIN GLYCOSYLATED A1C: CPT

## 2020-03-22 PROCEDURE — 86431 RHEUMATOID FACTOR QUANT: CPT

## 2020-03-22 PROCEDURE — 93005 ELECTROCARDIOGRAM TRACING: CPT

## 2020-03-22 PROCEDURE — 97163 PT EVAL HIGH COMPLEX 45 MIN: CPT

## 2020-03-22 PROCEDURE — 76942 ECHO GUIDE FOR BIOPSY: CPT

## 2020-03-22 PROCEDURE — 83090 ASSAY OF HOMOCYSTEINE: CPT

## 2020-03-22 PROCEDURE — 87070 CULTURE OTHR SPECIMN AEROBIC: CPT

## 2020-03-22 PROCEDURE — 89051 BODY FLUID CELL COUNT: CPT

## 2020-03-22 PROCEDURE — 70496 CT ANGIOGRAPHY HEAD: CPT

## 2020-03-22 PROCEDURE — 85730 THROMBOPLASTIN TIME PARTIAL: CPT

## 2020-03-22 PROCEDURE — 85652 RBC SED RATE AUTOMATED: CPT

## 2020-03-22 PROCEDURE — 84443 ASSAY THYROID STIM HORMONE: CPT

## 2020-03-22 PROCEDURE — 86140 C-REACTIVE PROTEIN: CPT

## 2020-03-22 PROCEDURE — 72156 MRI NECK SPINE W/O & W/DYE: CPT

## 2020-03-22 RX ORDER — CYCLOBENZAPRINE HYDROCHLORIDE 10 MG/1
1 TABLET, FILM COATED ORAL
Qty: 15 | Refills: 0
Start: 2020-03-22 | End: 2020-03-26

## 2020-03-22 RX ORDER — TRAMADOL HYDROCHLORIDE 50 MG/1
0.5 TABLET ORAL
Qty: 7.5 | Refills: 0
Start: 2020-03-22 | End: 2020-04-05

## 2020-03-22 RX ORDER — PANTOPRAZOLE SODIUM 20 MG/1
1 TABLET, DELAYED RELEASE ORAL
Qty: 15 | Refills: 0
Start: 2020-03-22 | End: 2020-04-05

## 2020-03-22 RX ORDER — ATORVASTATIN CALCIUM 80 MG/1
1 TABLET, FILM COATED ORAL
Qty: 30 | Refills: 0
Start: 2020-03-22 | End: 2020-04-20

## 2020-03-22 RX ORDER — GABAPENTIN 400 MG/1
1 CAPSULE ORAL
Qty: 90 | Refills: 0
Start: 2020-03-22 | End: 2020-04-20

## 2020-03-22 RX ADMIN — TRAMADOL HYDROCHLORIDE 25 MILLIGRAM(S): 50 TABLET ORAL at 05:20

## 2020-03-22 RX ADMIN — PANTOPRAZOLE SODIUM 40 MILLIGRAM(S): 20 TABLET, DELAYED RELEASE ORAL at 05:51

## 2020-03-22 RX ADMIN — Medication 58 MILLIGRAM(S): at 05:51

## 2020-03-22 RX ADMIN — Medication 650 MILLIGRAM(S): at 01:51

## 2020-03-22 RX ADMIN — Medication 25 MILLIGRAM(S): at 05:50

## 2020-03-22 RX ADMIN — AMLODIPINE BESYLATE 10 MILLIGRAM(S): 2.5 TABLET ORAL at 05:50

## 2020-03-22 RX ADMIN — Medication 650 MILLIGRAM(S): at 02:48

## 2020-03-22 RX ADMIN — TRAMADOL HYDROCHLORIDE 25 MILLIGRAM(S): 50 TABLET ORAL at 04:34

## 2020-03-22 NOTE — DISCHARGE NOTE PROVIDER - CARE PROVIDERS DIRECT ADDRESSES
,[email or web address],[email or web address]

## 2020-03-22 NOTE — DISCHARGE NOTE PROVIDER - NSDCMRMEDTOKEN_GEN_ALL_CORE_FT
amLODIPine 10 mg oral tablet: 1 tab(s) orally once a day  atorvastatin 20 mg oral tablet: 1 tab(s) orally once a day (at bedtime)  gabapentin 100 mg oral capsule: 1 cap(s) orally 3 times a day. To start on 3/25 if symptoms of Right arm/leg numbness tingling do not improve  metoprolol: 25 milligram(s) orally once a day  Outpatient physical therapy: Right sided numbness/tingling with MRI/CT changes   pantoprazole 40 mg oral delayed release tablet: 1 tab(s) orally once a day (before a meal)  predniSONE 10 mg oral tablet: 6 tabs by mouth daily x 3 days, then 4 tabs daily x 3 days, then 2 tabs daily x 3 days, then 1 tab daily x 3 days &amp; STOP amLODIPine 10 mg oral tablet: 1 tab(s) orally once a day  atorvastatin 20 mg oral tablet: 1 tab(s) orally once a day (at bedtime)  cyclobenzaprine 5 mg oral tablet: 1 tab(s) orally 3 times a day as needed for muscle spasm MDD:3 tabs  gabapentin 100 mg oral capsule: 1 cap(s) orally 3 times a day. To start on 3/25 if symptoms of Right arm/leg numbness tingling do not improve  metoprolol: 25 milligram(s) orally once a day  Outpatient physical therapy: Right sided numbness/tingling with MRI/CT changes   pantoprazole 40 mg oral delayed release tablet: 1 tab(s) orally once a day (before a meal)  predniSONE 10 mg oral tablet: 6 tabs by mouth daily x 3 days, then 4 tabs daily x 3 days, then 2 tabs daily x 3 days, then 1 tab daily x 3 days &amp; STOP amLODIPine 10 mg oral tablet: 1 tab(s) orally once a day  atorvastatin 20 mg oral tablet: 1 tab(s) orally once a day (at bedtime)  cyclobenzaprine 5 mg oral tablet: 1 tab(s) orally 3 times a day as needed for muscle spasm MDD:3 tabs  gabapentin 100 mg oral capsule: 1 cap(s) orally 3 times a day. To start on 3/25 if symptoms of Right arm/leg numbness tingling do not improve  metoprolol: 25 milligram(s) orally once a day  Outpatient physical therapy: Right sided numbness/tingling with MRI/CT changes   pantoprazole 40 mg oral delayed release tablet: 1 tab(s) orally once a day (before a meal)  predniSONE 10 mg oral tablet: 6 tabs by mouth daily x 3 days, then 4 tabs daily x 3 days, then 2 tabs daily x 3 days, then 1 tab daily x 3 days &amp; STOP  traMADol 50 mg oral tablet: 0.5 tab(s) orally once a day x 15 days total to use as needed for moderate to severe pain MDD:1  tab

## 2020-03-22 NOTE — DISCHARGE NOTE PROVIDER - NSDCFUADDINST_GEN_ALL_CORE_FT
11.4   18.09 )-----------( 275      ( 21 Mar 2020 06:34 )             35.5         137  |  98  |  13.0  ----------------------------<  168<H>  4.1   |  24.0  |  0.61    Ca    9.7      20 Mar 2020 09:36    TPro  x   /  Alb  4404  /  TBili  x   /  DBili  x   /  AST  x   /  ALT  x   /  AlkPhos  x       CSF IgG Index (20 @ 02:01)    Quantitative Ig mg/dL    Albumin, Serum: 4404 mg/dL    IgG CSF: 4.3 mg/dL    CSF ALBU: 9.6 mg/dL    IgG/Albumin Ratio, Serum: 0.35 Ratio    IgG/Albumin Ratio, CSF: 0.45 Ratio    IgG Index: 1.3    IgG Synthesis: 7.7 mg/day    pending oligoclonal bands CSF    RADIOLOGY & ADDITIONAL TESTS:  < from: US Duplex Venous Upper Ext Ltd, Right (20 @ 15:12) >  IMPRESSION:     Peripheral nonocclusive thrombus within the cephalic vein in the distal aspect of the upper arm (a superficial vein).    R LE doppler negative      < from: MR Thoracic Spine w/wo IV Cont (20 @ 11:55) >    IMPRESSION:     No abnormal cord signal or enhancement.    < from: TTE Echo Complete w/ Contrast w/ Doppler (20 @ 10:13) >  Summary:   1. Left ventricular ejection fraction, by visual estimation, is 55 to 60%.   2. Normal global left ventricular systolic function.   3. Mildly increased LV wall thickness.   4. There is no evidence of pericardial effusion.   5. Endocardial visualization was enhanced with intravenous echo contrast.    < from: CT Head No Cont (20 @ 15:43) >    IMPRESSION:  Mild chronic microvascular changes without evidence of an acute transcortical infarction or hemorrhage. MR is a more sensitive imaging modality for the evaluation of an acute infarction.     < from: MR Head No Cont (20 @ 23:30) >  IMPRESSION:     Few T2/FLAIR hyperintense foci in the subcortical, periventricular, and deep white matter, nonspecific finding, which may represent sequelae of microangiopathic, postinflammatory/postinfectious, or demyelinating etiology. Clinical correlation is advised.      < from: MR Cervical Spine w/wo IV Cont (20 @ 12:13) >  IMPRESSION:     Enhancing T2/STIR hyperintense intramedullary lesion in the right aspect of the spinal cord at C1-C2 level measuring 9 x 4 mm. This finding is nonspecific and may represent neoplastic, demyelinating and/or infectious pathology. Clinical correlation is advised.

## 2020-03-22 NOTE — DISCHARGE NOTE PROVIDER - HOSPITAL COURSE
Patient is a 46 y/o female with PMH of HTN who presented to the ER with Rt facial, Rt UE and LE weakness and numbness, NIH on admission 3 admitted for r/o CVA vs myelitis/demyelinating lesions. CT head negative for CVA/ICH. MR head few T2/FLAIR hypintense foci in subcortical, periventricular and deep white matter non specific finding microangiopathic, postinflammatory/postinfectious or demyelinating etiology. MR C-spine enhancing T2/STIR hyperintense intramedullary lesion in Rt aspect of C1-C2 9x4mm, non specific lesion. LP at bedside was unsuccessful s/p IR LP on 3/19. MR T-spine with no cord compression. Patient with possible myelitis started on solumedrol 1gm x 5 days with last day on 3/22.  Studies show only an elevated TEA titer, oligoclonal bands and NMO ab pending.   Patient c/o right sided swelling which is minimal but present slightly to our eyes. She feels that it is present and verbalized wanting to be taken seriously. She was found to have on the RUE Duplex a nonocclusive thrombus in the cephalic vein (superficial). She has also not had any improvement in her subjective symptoms since steroids have been started.         During her hospital stay, she was seen by Neurology. Found to have normal B12, folate/TSH. She is to f/up with Dr. Celaya in the MS clinic. Her blood pressure was stable on home medications. Plan d/w patient in detail. She c/o Right arm/leg numbness/tingling. Will give a prescription for gabapentin to start if symptoms do not improve in the next few days.         Total time coordinating this discharge was 40 minutes.         Physical exam and VS on the day of discharge:     Vital Signs Last 24 Hrs    T(C): 37 (21 Mar 2020 21:49), Max: 37 (21 Mar 2020 21:49)    T(F): 98.6 (21 Mar 2020 21:49), Max: 98.6 (21 Mar 2020 21:49)    HR: 85 (22 Mar 2020 04:57) (69 - 89)    RR: 18 (21 Mar 2020 21:49) (18 - 18)    SpO2: 99% (21 Mar 2020 21:49) (98% - 99%)        Physical Exam:     GEN:  female, sitting in bed, well-groomed, well-developed, able to speak in full sentences    HEENT: NC/AT, clear sclera, PEERL, EOMI, moist oral mucosa, no tongue and uvula deviation    Pulm: Clear to auscultation bilaterally; No rales, rhonchi, wheezing, or rubs    CVS: +S1, +S2, Regular rate and rhythm; No murmurs, rubs, or gallops    GI: Soft, Nontender, Nondistended; Bowel sounds present    EXT:  2+ Peripheral Pulses, No clubbing, cyanosis. Minimal edema to RLE and RUE. Measured LE - equal in diameter at gastrocnemius. Measured b/l mid humerus for UE and right UE slightly larger than left by about 0.5 inch    Neuro:  Alert & Oriented X4, Good concentration; L hand strength > R hand strength, Motor Strength 4/5 Rt upper and lower extremities, Right LLE motor strength is 4/5 also. 5/5 LT upper and lower extremities; DTRs 2+ Rt brachial, sensory intact, no CN deficit. Subjective numbness/tingling sensation to RLE     SKIN: No rashes or lesions

## 2020-03-22 NOTE — DISCHARGE NOTE PROVIDER - NSDCCPCAREPLAN_GEN_ALL_CORE_FT
PRINCIPAL DISCHARGE DIAGNOSIS  Diagnosis: Numbness on right side  Assessment and Plan of Treatment: concern for inflammatory disease (Transverse myelitis, myelopathy are on the differential in addition to Multiple sclerosis.   You received multiple MRI testing (results below) and were started on high dose IV steroids for 5 days (last day 3/22). You did not have significnt improvement in your symptoms and some of your testing is still pending in lab.   You are to go home on a prednisone taper of 60mg daily x 3 days, 40 mg daily x 3 days, 20 mg daily x 3 days and then 10mg daily x 3 days. we added protonix for stomach protection  We add gabapentin for you to help with numbness and tingling if those symptoms do not resolve in 2-3 days after going home      SECONDARY DISCHARGE DIAGNOSES  Diagnosis: Leucocytosis  Assessment and Plan of Treatment: likely due to steroids. No e/o infection and ROS negative.    Diagnosis: Superficial vein thrombosis  Assessment and Plan of Treatment: right upper extremity non occlusive cephalic vein thrombosis   No intervention    Diagnosis: Essential hypertension  Assessment and Plan of Treatment: continue home medications    Diagnosis: CVA (cerebral vascular accident)  Assessment and Plan of Treatment: ruled out PRINCIPAL DISCHARGE DIAGNOSIS  Diagnosis: Numbness on right side  Assessment and Plan of Treatment: concern for inflammatory disease (Transverse myelitis, myelopathy are on the differential in addition to Multiple sclerosis.   You received multiple MRI testing (results below) and were started on high dose IV steroids for 5 days (last day 3/22). You did not have significnt improvement in your symptoms and some of your testing is still pending in lab.   You are to go home on a prednisone taper of 60mg daily x 3 days, 40 mg daily x 3 days, 20 mg daily x 3 days and then 10mg daily x 3 days. we added protonix for stomach protection. Flexeril added as well for muscle spasm to use as needed.   We add gabapentin for you to help with numbness and tingling if those symptoms do not resolve in 2-3 days after going home      SECONDARY DISCHARGE DIAGNOSES  Diagnosis: Leucocytosis  Assessment and Plan of Treatment: likely due to steroids. No e/o infection and ROS negative.    Diagnosis: Superficial vein thrombosis  Assessment and Plan of Treatment: right upper extremity non occlusive cephalic vein thrombosis   No intervention    Diagnosis: Essential hypertension  Assessment and Plan of Treatment: continue home medications    Diagnosis: CVA (cerebral vascular accident)  Assessment and Plan of Treatment: ruled out PRINCIPAL DISCHARGE DIAGNOSIS  Diagnosis: Numbness on right side  Assessment and Plan of Treatment: concern for inflammatory disease (Transverse myelitis, myelopathy are on the differential in addition to Multiple sclerosis.   You received multiple MRI testing (results below) and were started on high dose IV steroids for 5 days (last day 3/22). You did not have significnt improvement in your symptoms and some of your testing is still pending in lab.   You are to go home on a prednisone taper of 60mg daily x 3 days, 40 mg daily x 3 days, 20 mg daily x 3 days and then 10mg daily x 3 days. we added protonix for stomach protection. Flexeril added as well for muscle spasm to use as needed. Tramadol as needed given for pain control as well   We add gabapentin for you to help with numbness and tingling if those symptoms do not resolve in 2-3 days after going home      SECONDARY DISCHARGE DIAGNOSES  Diagnosis: Leucocytosis  Assessment and Plan of Treatment: likely due to steroids. No e/o infection and ROS negative.    Diagnosis: Superficial vein thrombosis  Assessment and Plan of Treatment: right upper extremity non occlusive cephalic vein thrombosis   No intervention    Diagnosis: Essential hypertension  Assessment and Plan of Treatment: continue home medications    Diagnosis: CVA (cerebral vascular accident)  Assessment and Plan of Treatment: ruled out

## 2020-03-22 NOTE — DISCHARGE NOTE PROVIDER - CARE PROVIDER_API CALL
Nba Celaya)  Neurology  [address] [place] [address]  Phone: [phone]  Fax: [phone]  Follow Up Time:     Himanshu Norwood)  Infectious Disease; Internal Medicine  [address] [place] [address], [address] [place] [address]  Phone: [phone]  Fax: [phone]  Follow Up Time:

## 2020-03-22 NOTE — DISCHARGE NOTE PROVIDER - NSDCFUADDAPPT_GEN_ALL_CORE_FT
Please make an appt with your primary card MD in 2-3 days after discharge. In addition, you should see the neurologist in 1-2 weeks after discharge  Physical therapy on discharge to be assessed

## 2020-03-22 NOTE — CHART NOTE - NSCHARTNOTEFT_GEN_A_CORE
Patient was admitted to [facility] on 3/17/20 and discharged on 3/22/20. 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

## 2020-03-24 LAB — B BURGDOR DNA SPEC QL NAA+PROBE: NEGATIVE — SIGNIFICANT CHANGE UP

## 2020-03-25 LAB — AQP4 H2O CHANNEL AB SERPL IA-ACNC: NEGATIVE — SIGNIFICANT CHANGE UP

## 2020-03-27 LAB — B BURGDOR AB CSF-ACNC: SIGNIFICANT CHANGE UP

## 2020-06-16 ENCOUNTER — APPOINTMENT (OUTPATIENT)
Dept: BARIATRICS/WEIGHT MGMT | Facility: CLINIC | Age: 45
End: 2020-06-16

## 2020-07-27 NOTE — PHYSICAL EXAM
Rt call [Obese, well nourished, in no acute distress] : obese, well nourished, in no acute distress [Normal] : affect appropriate [de-identified] : obese, soft, nontender, no evidence of hernia, incisions well healed

## 2020-08-13 ENCOUNTER — APPOINTMENT (OUTPATIENT)
Dept: BARIATRICS | Facility: CLINIC | Age: 45
End: 2020-08-13
Payer: MEDICAID

## 2020-08-13 VITALS — HEIGHT: 68 IN | WEIGHT: 257 LBS | BODY MASS INDEX: 38.95 KG/M2

## 2020-08-13 PROCEDURE — 99214 OFFICE O/P EST MOD 30 MIN: CPT | Mod: 95

## 2020-08-13 RX ORDER — MELOXICAM 7.5 MG/1
7.5 TABLET ORAL TWICE DAILY
Qty: 60 | Refills: 0 | Status: COMPLETED | COMMUNITY
Start: 2019-04-25 | End: 2020-08-13

## 2020-08-13 NOTE — REASON FOR VISIT
[Follow-Up Visit] : a follow-up visit for [Morbid Obesity (BMI<40)] : morbid obesity (bmi<40) [Medical Office: ([facility])___] : at the medical office located in  [Home] : at home, [unfilled] , at the time of the visit. [Verbal consent obtained from patient] : the patient, [unfilled]

## 2020-08-19 NOTE — HISTORY OF PRESENT ILLNESS
[Procedure: ___] : Procedure performed: [unfilled]  [Date of Surgery: ___] : Date of Surgery:   [unfilled] [Surgeon Name:   ___] : Surgeon Name: Dr. CAPONE [Pre-Op Weight ___] : Pre-op weight was [unfilled] lbs [de-identified] : 46 yo F  with long-standing history of morbid obesity status post Lap Band.Pt is here for a TELEMEDICINE VISIT to restart the process to convert to a lap sleeve gastrectomy. Patient has had overall modest weight loss with pre-operative weight 255, lowest 223. Current weight in 256 lbs.Patient had Lap Band removed 2014 secondary to dysphagia. Since that time her symptoms have resolved but she has been unable to lose weight. Patient has multiple obesity related medical problems and realizes that weight loss would improve her  health. Patient is familiar with the Laparoscopic Adjustable Gastric Band, the Laparoscopic Sleeve Gastrectomy and the Laparoscopic Gastric Bypass. Patient had been seen in the past for evaluation for weight loss surgery however patient was unable to proceed with surgery at that time. Patient now realizes that she will be unable to lose and maintain weight loss with diet and exercise alone and presents today for reevaluation and to discuss options for surgery, specifically the Laparoscopic Sleeve Gastrectomy.Pt is currently staying on track with nutritionist- making better food choices.Pt states she is unable to walk long distances due to history of knee pain associated with an injury. Scheduled follow up with nutritionist scheduled on 8/20/2020. Upper EGD scheduled on 8/28/2020- Insurance does not require monthly weigh in visits prior to surgery.  [de-identified] : Laparoscopic removal of Lap Band and port, 2014, Dr. Kelly

## 2020-08-19 NOTE — ASSESSMENT
[FreeTextEntry1] : 44 yo F  with long-standing history of morbid obesity status post Lap Band.Pt is here for a TELEMEDICINE VISIT to restart the process to convert to a lap sleeve gastrectomy. Patient has had overall modest weight loss with pre-operative weight 255, lowest 223. Current weight in 256 lbs.  Patient had Lap Band removed 2014 secondary to dysphagia. \par \par Will schedule surgery once workup is complete. Plan to review requirements needed with Emmanuelle prior to scheduling surgery. \par \par Appointments for clearances and testing have been scheduled. \par Scheduled follow up with nutritionist scheduled on 8/20/2020. Upper EGD scheduled on 8/28/2020- Insurance does not require monthly weigh in visits prior to surgery. \par \par Nutritional counseling has been provided. The patient is encouraged to remain calorie conscious and continue a low fat, low carbohydrate, protein focus diet. Pt encouraged to participate in a daily exercise regimen incorporating cardio and strength training. \par \par Return to office in 6-8  weeks weeks or earlier if any concerns. \par

## 2020-08-20 ENCOUNTER — APPOINTMENT (OUTPATIENT)
Dept: BARIATRICS/WEIGHT MGMT | Facility: CLINIC | Age: 45
End: 2020-08-20
Payer: MEDICAID

## 2020-08-20 VITALS — HEIGHT: 68 IN | WEIGHT: 256 LBS | BODY MASS INDEX: 38.8 KG/M2

## 2020-08-20 PROCEDURE — 97803 MED NUTRITION INDIV SUBSEQ: CPT | Mod: 95

## 2020-09-04 ENCOUNTER — APPOINTMENT (OUTPATIENT)
Dept: INTERNAL MEDICINE | Facility: CLINIC | Age: 45
End: 2020-09-04
Payer: MEDICAID

## 2020-09-04 PROCEDURE — 99214 OFFICE O/P EST MOD 30 MIN: CPT | Mod: 25

## 2020-09-04 PROCEDURE — 36415 COLL VENOUS BLD VENIPUNCTURE: CPT

## 2020-09-05 NOTE — PLAN
[FreeTextEntry1] : Patient is here for   FOLLOWUP exam   FEELS OK HAS COMPLETED HER NO\par PLAN FOR BARIATRIC SURGERY \par WILL CHECK LABS \par OVERALL DOIING WELL NEEED TO EXERCISE MORE\par

## 2020-09-05 NOTE — HISTORY OF PRESENT ILLNESS
[FreeTextEntry1] : FOLLOWUP ON  BARIATRIC PLANS  AND BP  [de-identified] : PT HERE FOR   EXAM FEELS  OK   H AS  COMPLETED HER NP\par ALSO PLANS TO HAVE BARIATRIC SURGERY  OVERALL FEELS OK\par HAD NOT HAD BLOOD WORK RECENTLY

## 2020-09-08 ENCOUNTER — APPOINTMENT (OUTPATIENT)
Dept: BARIATRICS/WEIGHT MGMT | Facility: CLINIC | Age: 45
End: 2020-09-08
Payer: MEDICAID

## 2020-09-08 VITALS — BODY MASS INDEX: 38.65 KG/M2 | HEIGHT: 68 IN | WEIGHT: 255 LBS

## 2020-09-08 DIAGNOSIS — F31.73 BIPOLAR DISORDER, IN PARTIAL REMISSION, MOST RECENT EPISODE MANIC: ICD-10-CM

## 2020-09-08 DIAGNOSIS — Z81.8 FAMILY HISTORY OF OTHER MENTAL AND BEHAVIORAL DISORDERS: ICD-10-CM

## 2020-09-08 DIAGNOSIS — Z87.891 PERSONAL HISTORY OF NICOTINE DEPENDENCE: ICD-10-CM

## 2020-09-08 DIAGNOSIS — F43.23 ADJUSTMENT DISORDER WITH MIXED ANXIETY AND DEPRESSED MOOD: ICD-10-CM

## 2020-09-08 DIAGNOSIS — Z78.9 OTHER SPECIFIED HEALTH STATUS: ICD-10-CM

## 2020-09-08 LAB
25(OH)D3 SERPL-MCNC: 145 NG/ML
ALBUMIN SERPL ELPH-MCNC: 4.8 G/DL
ALP BLD-CCNC: 70 U/L
ALT SERPL-CCNC: 21 U/L
ANION GAP SERPL CALC-SCNC: 13 MMOL/L
AST SERPL-CCNC: 15 U/L
BASOPHILS # BLD AUTO: 0.04 K/UL
BASOPHILS NFR BLD AUTO: 0.5 %
BILIRUB SERPL-MCNC: 0.7 MG/DL
BUN SERPL-MCNC: 9 MG/DL
CALCIUM SERPL-MCNC: 9.7 MG/DL
CHLORIDE SERPL-SCNC: 103 MMOL/L
CHOLEST SERPL-MCNC: 174 MG/DL
CHOLEST/HDLC SERPL: 3.8 RATIO
CO2 SERPL-SCNC: 22 MMOL/L
CREAT SERPL-MCNC: 0.6 MG/DL
EOSINOPHIL # BLD AUTO: 0.19 K/UL
EOSINOPHIL NFR BLD AUTO: 2.5 %
ESTIMATED AVERAGE GLUCOSE: 94 MG/DL
GLUCOSE SERPL-MCNC: 93 MG/DL
HBA1C MFR BLD HPLC: 4.9 %
HCT VFR BLD CALC: 42 %
HDLC SERPL-MCNC: 46 MG/DL
HGB BLD-MCNC: 12.9 G/DL
IMM GRANULOCYTES NFR BLD AUTO: 0.4 %
LDLC SERPL CALC-MCNC: 110 MG/DL
LYMPHOCYTES # BLD AUTO: 1.9 K/UL
LYMPHOCYTES NFR BLD AUTO: 25.3 %
MAN DIFF?: NORMAL
MCHC RBC-ENTMCNC: 30.7 GM/DL
MCHC RBC-ENTMCNC: 31 PG
MCV RBC AUTO: 101 FL
MONOCYTES # BLD AUTO: 0.61 K/UL
MONOCYTES NFR BLD AUTO: 8.1 %
NEUTROPHILS # BLD AUTO: 4.74 K/UL
NEUTROPHILS NFR BLD AUTO: 63.2 %
PLATELET # BLD AUTO: 309 K/UL
POTASSIUM SERPL-SCNC: 4.1 MMOL/L
PROT SERPL-MCNC: 7.4 G/DL
RBC # BLD: 4.16 M/UL
RBC # FLD: 13.3 %
SODIUM SERPL-SCNC: 139 MMOL/L
T4 SERPL-MCNC: 7.7 UG/DL
TRIGL SERPL-MCNC: 90 MG/DL
TSH SERPL-ACNC: 1.8 UIU/ML
WBC # FLD AUTO: 7.51 K/UL

## 2020-09-08 PROCEDURE — 90791 PSYCH DIAGNOSTIC EVALUATION: CPT | Mod: 95

## 2020-09-15 ENCOUNTER — APPOINTMENT (OUTPATIENT)
Dept: CARDIOLOGY | Facility: CLINIC | Age: 45
End: 2020-09-15
Payer: MEDICAID

## 2020-09-15 ENCOUNTER — NON-APPOINTMENT (OUTPATIENT)
Age: 45
End: 2020-09-15

## 2020-09-15 VITALS
OXYGEN SATURATION: 100 % | SYSTOLIC BLOOD PRESSURE: 126 MMHG | WEIGHT: 256 LBS | HEART RATE: 79 BPM | BODY MASS INDEX: 38.93 KG/M2 | TEMPERATURE: 98.3 F | RESPIRATION RATE: 16 BRPM | DIASTOLIC BLOOD PRESSURE: 80 MMHG

## 2020-09-15 VITALS — SYSTOLIC BLOOD PRESSURE: 123 MMHG | DIASTOLIC BLOOD PRESSURE: 81 MMHG

## 2020-09-15 PROCEDURE — 93000 ELECTROCARDIOGRAM COMPLETE: CPT

## 2020-09-15 PROCEDURE — 99203 OFFICE O/P NEW LOW 30 MIN: CPT | Mod: 25

## 2020-09-15 RX ORDER — PANTOPRAZOLE 40 MG/1
40 TABLET, DELAYED RELEASE ORAL DAILY
Qty: 90 | Refills: 2 | Status: DISCONTINUED | COMMUNITY
Start: 2020-09-15 | End: 2020-09-15

## 2020-09-15 RX ORDER — GABAPENTIN 100 MG/1
100 CAPSULE ORAL 3 TIMES DAILY
Refills: 0 | Status: DISCONTINUED | COMMUNITY
Start: 2020-09-15 | End: 2020-09-15

## 2020-09-15 RX ORDER — CYCLOBENZAPRINE HYDROCHLORIDE 5 MG/1
5 TABLET, FILM COATED ORAL
Refills: 0 | Status: DISCONTINUED | COMMUNITY
Start: 2020-09-15 | End: 2020-09-15

## 2020-09-18 ENCOUNTER — APPOINTMENT (OUTPATIENT)
Dept: BARIATRICS | Facility: CLINIC | Age: 45
End: 2020-09-18
Payer: MEDICAID

## 2020-09-18 VITALS — WEIGHT: 255 LBS | HEIGHT: 68 IN | BODY MASS INDEX: 38.65 KG/M2

## 2020-09-18 DIAGNOSIS — E66.8 OTHER OBESITY: ICD-10-CM

## 2020-09-18 PROCEDURE — 99213 OFFICE O/P EST LOW 20 MIN: CPT | Mod: 95

## 2020-09-21 ENCOUNTER — RX RENEWAL (OUTPATIENT)
Age: 45
End: 2020-09-21

## 2020-09-22 NOTE — HISTORY OF PRESENT ILLNESS
[Home] : at home, [unfilled] , at the time of the visit. [Medical Office: ([facility])___] : at the medical office located in  [Verbal consent obtained from patient] : the patient, [unfilled] [de-identified] : 45 year old female with longstanding history of obesity including lap band and removal of lap band secondary to dysphagia now undergoing workup for laparoscopic sleeve gastrectomy presents today for follow up visit. Patient lost weight since last visit. Patient is in the process of completing preoperative evaluations in the next few months. She met with nutritionist last month and is making efforts to have three protein rich meals a day and drink mostly water. Patient also received psych clearance last week. She has PFT, sleep study and upper EGD next week and ECHO next month. [de-identified] : Lap Band Realize, Date of Surgery: 3/4/2009, Surgeon Name: Dr. Dorantes . Pre-op weight was 255 lbs. \par Laparoscopic removal of Lap Band and port, 8/4/2014, Dr. Kelly

## 2020-09-22 NOTE — REVIEW OF SYSTEMS
[Recent Change In Weight] : ~T recent weight change [Negative] : Endocrine [Fever] : no fever [Chills] : no chills [Night Sweats] : no night sweats [Fatigue] : no fatigue [Eye Pain] : no eye pain [Red Eyes] : eyes not red [Vision Problems] : no vision problems [Dysphagia] : no dysphagia [Hoarseness] : no hoarseness [Odynophagia] : no odynophagia [Chest Pain] : no chest pain [Palpitations] : no palpitations [Leg Claudication] : no intermittent leg claudication [Lower Ext Edema] : no lower extremity edema [Shortness Of Breath] : no shortness of breath [Wheezing] : no wheezing [Cough] : no cough

## 2020-09-22 NOTE — ASSESSMENT
[FreeTextEntry1] : 45 year old female with longstanding history of obesity including lap band and removal of lap band secondary to dysphagia now undergoing workup for laparoscopic sleeve gastrectomy presents today for follow up visit.  Patient lost weight since last visit and is encouraged to continue to lose weight prior to surgery. Nutrition and exercise guidelines were reviewed with the patient. Procedure and risks reviewed.  The additional risks associated with current COVID 19 pandemic was discussed in detail.  Patient was informed that she will get tested for COVID 19 prior to surgery and need to self quarantine for a period of time before and after surgery.  All questions answered.\par \par Outstanding items - letter of support from PCP, diet history, incomplete labs, upper EGD, pulmonary workup and clearance, ECHO and cardiology clearance\par Follow up IN OFFICE in one month\par Call with any questions or concerns.\par

## 2020-09-22 NOTE — PHYSICAL EXAM
[Obese, well nourished, in no acute distress] : obese, well nourished, in no acute distress [Normal] : affect appropriate [de-identified] : EOMI, anicteric

## 2020-10-14 ENCOUNTER — APPOINTMENT (OUTPATIENT)
Dept: CARDIOLOGY | Facility: CLINIC | Age: 45
End: 2020-10-14
Payer: MEDICAID

## 2020-10-14 PROCEDURE — 93306 TTE W/DOPPLER COMPLETE: CPT

## 2020-10-22 ENCOUNTER — NON-APPOINTMENT (OUTPATIENT)
Age: 45
End: 2020-10-22

## 2020-10-22 ENCOUNTER — APPOINTMENT (OUTPATIENT)
Dept: BARIATRICS | Facility: CLINIC | Age: 45
End: 2020-10-22

## 2020-10-22 DIAGNOSIS — Z13.0 ENCOUNTER FOR SCREENING FOR DISEASES OF THE BLOOD AND BLOOD-FORMING ORGANS AND CERTAIN DISORDERS INVOLVING THE IMMUNE MECHANISM: ICD-10-CM

## 2020-10-22 NOTE — REASON FOR VISIT
[Initial Evaluation] : an initial evaluation of [FreeTextEntry2] : Preoperative cardiac risk evaluation for bariatric surgery

## 2020-10-22 NOTE — DISCUSSION/SUMMARY
[FreeTextEntry1] : 45F history of HTN, HLD and obesity presents for preoperative cardiac risk evaluation for bariatric surgery. \par \par \par Overall excellent functional status with MET >4 and without cardiac complains, not indicted for stress testing. Will check baseline echocardiogram given chronic HTN. \par \par 1. Preoperative cardiac risk evaluation- patient is at overall low cardiac risk for the intermediate procedure,currently without unstable cardiac issues and can proceed with planned bariatric surgery without cardiac contraindication. \par \par 2. HTN- well controlled and at goal on current regimen. \par \par 3. HLD- lipid panel at goal on atorvastatin. \par \par \par Follow up as needed.

## 2020-10-22 NOTE — PHYSICAL EXAM
[General Appearance - Well Nourished] : well nourished [General Appearance - Well Developed] : well developed [Normal Conjunctiva] : the conjunctiva exhibited no abnormalities [Heart Rate And Rhythm] : heart rate and rhythm were normal [Heart Sounds] : normal S1 and S2 [Edema] : no peripheral edema present [Murmurs] : no murmurs present [Arterial Pulses Normal] : the arterial pulses were normal [Respiration, Rhythm And Depth] : normal respiratory rhythm and effort [] : no respiratory distress [Exaggerated Use Of Accessory Muscles For Inspiration] : no accessory muscle use [Bowel Sounds] : normal bowel sounds [Abdomen Tenderness] : non-tender [Abdomen Soft] : soft [Nail Clubbing] : no clubbing of the fingernails [Abnormal Walk] : normal gait [Oriented To Time, Place, And Person] : oriented to person, place, and time [Skin Color & Pigmentation] : normal skin color and pigmentation [Affect] : the affect was normal [Mood] : the mood was normal [FreeTextEntry1] : No JVD or carotid bruit

## 2020-10-22 NOTE — REVIEW OF SYSTEMS
[Joint Pain] : joint pain [Joint Swelling] : joint swelling [Fever] : no fever [Chills] : no chills [Blurry Vision] : no blurred vision [Earache] : no earache [Shortness Of Breath] : no shortness of breath [Dyspnea on exertion] : not dyspnea during exertion [Chest Pain] : no chest pain [Lower Ext Edema] : no extremity edema [Palpitations] : no palpitations [Cough] : no cough [Abdominal Pain] : no abdominal pain [Heartburn] : no heartburn [Dysuria] : no dysuria [Muscle Cramps] : no muscle cramps [Limb Weakness (Paresis)] : no limb weakness [Skin: A Rash] : no rash: [Dizziness] : no dizziness [Confusion] : no confusion was observed [Excessive Thirst] : no polydipsia [Easy Bleeding] : no tendency for easy bleeding

## 2020-10-22 NOTE — ADDENDUM
[FreeTextEntry1] : \par Echocardiogram done with normal biventricular systolic function. Currently optimized from cardiac standpoint to proceed with the bariatric surgery without cardiac contraindication. Hold antihypertensives on the day of the surgery to avoid intraoperative hypotension.

## 2020-10-22 NOTE — HISTORY OF PRESENT ILLNESS
[FreeTextEntry1] : 45F history of HTN, HLD and obesity presents for preoperative cardiac risk evaluation for bariatric surgery. \par \par She fell at work recently and injured her R-knee but still able to walk 10 blocks. She has chronic HTN >15 years managed by her PMD, only takes Triamterene/HCTZ every other day but on amlodipine/metoprolol daily. Denies any chest discomfort or dyspnea at rest or on exertion. She is working as a nurse practitioner. No first degree family history of CAD and she is a non-smoker. Denies DAVID but pending at home sleep study. \par \par Lipid panel: , TG 90, HDL 46,

## 2020-10-29 ENCOUNTER — APPOINTMENT (OUTPATIENT)
Dept: BARIATRICS | Facility: CLINIC | Age: 45
End: 2020-10-29
Payer: MEDICAID

## 2020-10-29 VITALS
BODY MASS INDEX: 39.56 KG/M2 | DIASTOLIC BLOOD PRESSURE: 80 MMHG | HEART RATE: 99 BPM | TEMPERATURE: 97.5 F | HEIGHT: 68 IN | WEIGHT: 261.02 LBS | OXYGEN SATURATION: 100 % | SYSTOLIC BLOOD PRESSURE: 130 MMHG

## 2020-10-29 PROCEDURE — 99072 ADDL SUPL MATRL&STAF TM PHE: CPT

## 2020-10-29 PROCEDURE — 99214 OFFICE O/P EST MOD 30 MIN: CPT

## 2020-10-29 RX ORDER — ATORVASTATIN CALCIUM 20 MG/1
20 TABLET, FILM COATED ORAL
Qty: 1 | Refills: 1 | Status: COMPLETED | COMMUNITY
Start: 2020-09-15 | End: 2020-10-29

## 2020-10-29 RX ORDER — TRIAMTERENE AND HYDROCHLOROTHIAZIDE 25; 37.5 MG/1; MG/1
37.5-25 TABLET ORAL
Qty: 90 | Refills: 1 | Status: DISCONTINUED | COMMUNITY
Start: 2020-09-04 | End: 2020-10-29

## 2020-11-04 LAB
ALBUMIN SERPL ELPH-MCNC: 4.5 G/DL
ALP BLD-CCNC: 84 U/L
ALT SERPL-CCNC: 26 U/L
ANION GAP SERPL CALC-SCNC: 11 MMOL/L
AST SERPL-CCNC: 19 U/L
BASOPHILS # BLD AUTO: 0.04 K/UL
BASOPHILS NFR BLD AUTO: 0.5 %
BILIRUB SERPL-MCNC: 0.6 MG/DL
BUN SERPL-MCNC: 9 MG/DL
CALCIUM SERPL-MCNC: 9.6 MG/DL
CHLORIDE SERPL-SCNC: 103 MMOL/L
CO2 SERPL-SCNC: 24 MMOL/L
CREAT SERPL-MCNC: 0.69 MG/DL
EOSINOPHIL # BLD AUTO: 0.24 K/UL
EOSINOPHIL NFR BLD AUTO: 3.1 %
GLUCOSE SERPL-MCNC: 114 MG/DL
HCT VFR BLD CALC: 40 %
HGB BLD-MCNC: 12.7 G/DL
IMM GRANULOCYTES NFR BLD AUTO: 0.3 %
LYMPHOCYTES # BLD AUTO: 1.98 K/UL
LYMPHOCYTES NFR BLD AUTO: 25.3 %
MAN DIFF?: NORMAL
MCHC RBC-ENTMCNC: 31.7 PG
MCHC RBC-ENTMCNC: 31.8 GM/DL
MCV RBC AUTO: 99.8 FL
MONOCYTES # BLD AUTO: 0.57 K/UL
MONOCYTES NFR BLD AUTO: 7.3 %
NEUTROPHILS # BLD AUTO: 4.98 K/UL
NEUTROPHILS NFR BLD AUTO: 63.5 %
PLATELET # BLD AUTO: 292 K/UL
POTASSIUM SERPL-SCNC: 4.1 MMOL/L
PROT SERPL-MCNC: 7 G/DL
RBC # BLD: 4.01 M/UL
RBC # FLD: 13.3 %
SARS-COV-2 IGG SERPL IA-ACNC: 0.09 INDEX
SARS-COV-2 IGG SERPL QL IA: NEGATIVE
SODIUM SERPL-SCNC: 139 MMOL/L
WBC # FLD AUTO: 7.83 K/UL

## 2020-11-04 NOTE — REVIEW OF SYSTEMS
[Recent Change In Weight] : ~T recent weight change [Negative] : Endocrine [Fever] : no fever [Chills] : no chills [Night Sweats] : no night sweats [Fatigue] : no fatigue [Eye Pain] : no eye pain [Red Eyes] : eyes not red [Vision Problems] : no vision problems [Dysphagia] : no dysphagia [Hoarseness] : no hoarseness [Odynophagia] : no odynophagia [Chest Pain] : no chest pain [Palpitations] : no palpitations [Leg Claudication] : no intermittent leg claudication [Lower Ext Edema] : no lower extremity edema [Shortness Of Breath] : no shortness of breath [Wheezing] : no wheezing [Cough] : no cough [FreeTextEntry2] : weight gain

## 2020-11-04 NOTE — PHYSICAL EXAM
[Obese, well nourished, in no acute distress] : obese, well nourished, in no acute distress [Normal] : affect appropriate [de-identified] : EOMI, anicteric  [de-identified] :  obese, soft, nontender, no evidence of hernia, incisions well healed.

## 2020-11-04 NOTE — ASSESSMENT
[FreeTextEntry1] : 45 year old female with longstanding history of obesity, lap band and lap band removal completed workup for laparoscopic sleeve gastrectomy and is ready to be scheduled for laparoscopic sleeve gastrectomy. Patient was encouraged to lose weight prior to surgery. Patient will be on preoperative modified liquid diet.  Nutrition and exercise guidelines were reviewed with the patient. Patient will attend preoperative education class. Procedure risks and benefits were again discussed with patient.  The additional risks associated with current COVID 19 pandemic was discussed in detail.  Patient was informed that she will get tested for COVID 19 prior to surgery and then need to self quarantine for a period of time before and after surgery. All questions were answered.\par \par Schedule surgery date\par One week preoperative modified liquid diet\par Preop labs\par PST and Medical clearance\par Preoperative education class\par Call if any questions or concerns.

## 2020-11-04 NOTE — HISTORY OF PRESENT ILLNESS
[de-identified] : 45 year old female with longstanding history of obesity, lap band and lap band removal completed workup for laparoscopic sleeve gastrectomy presents today for preoperative visit. Patient gained weight since last visit. She tends to miss breakfast and then has two protein rich meals a day. She drinks water and 16 oz of coffee with brown sugar. For exercise, she walks 10 K steps daily.  During workup, she was found to have mild DAVID and is now using AutoPAP nightly. She has POSITIVE motion sickness and denies history of postoperative nausea and urinary retention.  [de-identified] : Lap Band Realize, Date of Surgery: 3/4/2009, Surgeon Name: Dr. Dorantes . Pre-op weight was 255 lbs. \par Laparoscopic removal of Lap Band and port, 8/4/2014, Dr. Kelly

## 2020-11-09 NOTE — DISCHARGE NOTE NURSING/CASE MANAGEMENT/SOCIAL WORK - NSFLUVACAGEDISCH_IMM_ALL_CORE
Retinal tear and detachment warning symptoms reviewed and patient instructed to call immediately if increasing floaters, flashes, or decreasing peripheral vision. Adult

## 2020-11-10 ENCOUNTER — OUTPATIENT (OUTPATIENT)
Dept: OUTPATIENT SERVICES | Facility: HOSPITAL | Age: 45
LOS: 1 days | End: 2020-11-10
Payer: MEDICAID

## 2020-11-10 VITALS
TEMPERATURE: 98 F | HEIGHT: 69 IN | DIASTOLIC BLOOD PRESSURE: 81 MMHG | OXYGEN SATURATION: 98 % | HEART RATE: 83 BPM | WEIGHT: 261.91 LBS | RESPIRATION RATE: 16 BRPM | SYSTOLIC BLOOD PRESSURE: 131 MMHG

## 2020-11-10 DIAGNOSIS — I10 ESSENTIAL (PRIMARY) HYPERTENSION: ICD-10-CM

## 2020-11-10 DIAGNOSIS — Z98.89 OTHER SPECIFIED POSTPROCEDURAL STATES: Chronic | ICD-10-CM

## 2020-11-10 DIAGNOSIS — E66.9 OBESITY, UNSPECIFIED: ICD-10-CM

## 2020-11-10 DIAGNOSIS — G47.30 SLEEP APNEA, UNSPECIFIED: ICD-10-CM

## 2020-11-10 DIAGNOSIS — Z01.818 ENCOUNTER FOR OTHER PREPROCEDURAL EXAMINATION: ICD-10-CM

## 2020-11-10 DIAGNOSIS — Z98.84 BARIATRIC SURGERY STATUS: ICD-10-CM

## 2020-11-10 DIAGNOSIS — G47.33 OBSTRUCTIVE SLEEP APNEA (ADULT) (PEDIATRIC): ICD-10-CM

## 2020-11-10 DIAGNOSIS — Z11.59 ENCOUNTER FOR SCREENING FOR OTHER VIRAL DISEASES: ICD-10-CM

## 2020-11-10 PROCEDURE — G0463: CPT

## 2020-11-10 NOTE — H&P PST ADULT - HISTORY OF PRESENT ILLNESS
this is a 46 y/o female who has a weight problem which is causing some medical issues; to have gastric sleeve surgery

## 2020-11-10 NOTE — H&P PST ADULT - NSICDXFAMILYHX_GEN_ALL_CORE_FT
FAMILY HISTORY:  Family history of CVA, grandfather age 65  Family history of sinus bradycardia, mother has pacemaker    Sibling  Still living? Yes, Estimated age: 41-50  Family history of hypertension, Age at diagnosis: Age Unknown

## 2020-11-10 NOTE — H&P PST ADULT - NSICDXPROBLEM_GEN_ALL_CORE_FT
PROBLEM DIAGNOSES  Problem: Sleep apnea  Assessment and Plan: DAVID precautions    Problem: Obesity  Assessment and Plan: revision of laparoscopic sleeve gastrectomy with upper endoscopy, covid appt given, preop instructions given, had labwork, to have final medical clearance and ekg

## 2020-11-10 NOTE — H&P PST ADULT - TOBACCO USE
RX PROGRESS NOTE: Vancomycin Therapeutic Drug Monitoring    Day of therapy: 4    Indication and target trough: SSTI (10-15 mcg/mL)    Current vancomycin dosing regimen: 1000 mg IVPB every 12 hours    Most recent height and weight information:  Weight: 85.5 kg (10/23/17 0200)  Height: 6' 1\" (185.4 cm) (10/23/17 0200)    The Following are the Calculated  Current Weights for Godfrey Deleon     Adjusted Ideal        82.1 kg 79.9 kg            Labs:  Serum Creatinine and Creatinine Clearance:  Serum creatinine: 0.93 mg/dL 10/26/17 0410  Estimated creatinine clearance: 119.3 mL/min    Vancomycin Serum Concentrations:  VANCOMYCIN (TROUGH) (mcg/mL)   Date/Time Value   10/26/2017 0957 9.8 (L)       Assessment:  Serum concentration of 9.8 after the 6th dose.   Based on the serum concentration, will keep regimen at 1000 mg IVPB every 12 hours.    Additional serum concentrations may be necessary depending on pathogen identified, risk factors for adverse events, and/or duration of therapy.  Pharmacy will continue to follow and adjust as needed.    Thank you,    Eliane Solis [facility]  10/26/2017 10:54 AM     Former smoker

## 2020-11-10 NOTE — H&P PST ADULT - ADDITIONAL PE
physical exam today at bedside only; history taken by another provider at another dtae  [place], TORRES-C

## 2020-11-12 RX ORDER — ENOXAPARIN SODIUM 100 MG/ML
30 INJECTION SUBCUTANEOUS EVERY 12 HOURS
Refills: 0 | Status: DISCONTINUED | OUTPATIENT
Start: 2020-11-17 | End: 2020-11-18

## 2020-11-12 RX ORDER — ONDANSETRON 8 MG/1
4 TABLET, FILM COATED ORAL EVERY 6 HOURS
Refills: 0 | Status: DISCONTINUED | OUTPATIENT
Start: 2020-11-17 | End: 2020-11-18

## 2020-11-12 RX ORDER — HYOSCYAMINE SULFATE 0.13 MG
0.12 TABLET ORAL EVERY 6 HOURS
Refills: 0 | Status: DISCONTINUED | OUTPATIENT
Start: 2020-11-17 | End: 2020-11-18

## 2020-11-12 RX ORDER — HYDROMORPHONE HYDROCHLORIDE 2 MG/ML
0.5 INJECTION INTRAMUSCULAR; INTRAVENOUS; SUBCUTANEOUS EVERY 4 HOURS
Refills: 0 | Status: DISCONTINUED | OUTPATIENT
Start: 2020-11-17 | End: 2020-11-18

## 2020-11-12 RX ORDER — SODIUM CHLORIDE 9 MG/ML
1000 INJECTION, SOLUTION INTRAVENOUS
Refills: 0 | Status: DISCONTINUED | OUTPATIENT
Start: 2020-11-17 | End: 2020-11-18

## 2020-11-12 RX ORDER — PANTOPRAZOLE SODIUM 20 MG/1
40 TABLET, DELAYED RELEASE ORAL DAILY
Refills: 0 | Status: DISCONTINUED | OUTPATIENT
Start: 2020-11-17 | End: 2020-11-18

## 2020-11-13 ENCOUNTER — APPOINTMENT (OUTPATIENT)
Dept: INTERNAL MEDICINE | Facility: CLINIC | Age: 45
End: 2020-11-13
Payer: MEDICAID

## 2020-11-13 VITALS
DIASTOLIC BLOOD PRESSURE: 95 MMHG | HEIGHT: 68 IN | BODY MASS INDEX: 39.56 KG/M2 | HEART RATE: 65 BPM | WEIGHT: 261 LBS | SYSTOLIC BLOOD PRESSURE: 141 MMHG | TEMPERATURE: 98 F

## 2020-11-13 PROCEDURE — 99214 OFFICE O/P EST MOD 30 MIN: CPT

## 2020-11-13 PROCEDURE — 99072 ADDL SUPL MATRL&STAF TM PHE: CPT

## 2020-11-13 NOTE — PLAN
[FreeTextEntry1] : Ms. GLENDY WALKER is a 45 year female with a PMH of HTN, HLD, comes to the office for preoperative evaluation. Patient has greater than 4 METS, is a low perioperative risk for Major adverse cardiac event for an intermediate risk procedure. ECG and blood work reviewed. Cardiac clearance reviewed. No further testing indicated at this time. Patient is medically optimized for this procedure. \par \par During conversation with patient extensive medical records were reviewed including but not limited to, Hospital records records, out patient records, laboratory data and microbiology data \par In addition extensive time was also spent in reviewing diagnostic studies.\par \par Total encounter total time 25 mins\par >50% of time spent counseling/coordinating care \par \par Counseling included abnormal lab results, differential diagnoses, treatment options, risks and benefits, lifestyle changes, current condition, medications, and dose adjustments. \par The patient was interactive, attentive, asked questions, and verbalized understanding

## 2020-11-13 NOTE — ASSESSMENT
[Patient Optimized for Surgery] : Patient optimized for surgery [No Further Testing Recommended] : no further testing recommended [As per surgery] : as per surgery [FreeTextEntry4] : Ms. GLENDY WALKER is a 45 year female with a PMH of HTN, HLD, comes to the office for preoperative evaluation. Patient has greater than 4 METS, is a low perioperative risk for Major adverse cardiac event for an intermediate risk procedure. ECG and blood work reviewed. Cardiac clearance reviewed. No further testing indicated at this time. Patient is medically optimized for this procedure.

## 2020-11-13 NOTE — HISTORY OF PRESENT ILLNESS
[No Pertinent Cardiac History] : no history of aortic stenosis, atrial fibrillation, coronary artery disease, recent myocardial infarction, or implantable device/pacemaker [No Pertinent Pulmonary History] : no history of asthma, COPD, sleep apnea, or smoking [No Adverse Anesthesia Reaction] : no adverse anesthesia reaction in self or family member [Chronic Anticoagulation] : no chronic anticoagulation [Chronic Kidney Disease] : no chronic kidney disease [Diabetes] : no diabetes [(Patient denies any chest pain, claudication, dyspnea on exertion, orthopnea, palpitations or syncope)] : Patient denies any chest pain, claudication, dyspnea on exertion, orthopnea, palpitations or syncope [Moderate (4-6 METs)] : Moderate (4-6 METs) [FreeTextEntry1] : laparoscopic sleeve gastrectomy [FreeTextEntry2] : 11/17/2020 [FreeTextEntry3] : Dr. Lucie Kelly [FreeTextEntry4] : Ms. GLENDY WALKER is a 45 year female with a PMH of HTN, HLD, comes to the office for preoperative evaluation.

## 2020-11-15 ENCOUNTER — OUTPATIENT (OUTPATIENT)
Dept: OUTPATIENT SERVICES | Facility: HOSPITAL | Age: 45
LOS: 1 days | End: 2020-11-15
Payer: MEDICAID

## 2020-11-15 DIAGNOSIS — Z98.89 OTHER SPECIFIED POSTPROCEDURAL STATES: Chronic | ICD-10-CM

## 2020-11-15 DIAGNOSIS — Z11.59 ENCOUNTER FOR SCREENING FOR OTHER VIRAL DISEASES: ICD-10-CM

## 2020-11-15 LAB — SARS-COV-2 RNA SPEC QL NAA+PROBE: SIGNIFICANT CHANGE UP

## 2020-11-15 PROCEDURE — U0003: CPT

## 2020-11-16 ENCOUNTER — TRANSCRIPTION ENCOUNTER (OUTPATIENT)
Age: 45
End: 2020-11-16

## 2020-11-17 ENCOUNTER — INPATIENT (INPATIENT)
Facility: HOSPITAL | Age: 45
LOS: 0 days | Discharge: HOME | DRG: 621 | End: 2020-11-18
Attending: SURGERY | Admitting: SURGERY
Payer: MEDICAID

## 2020-11-17 ENCOUNTER — TRANSCRIPTION ENCOUNTER (OUTPATIENT)
Age: 45
End: 2020-11-17

## 2020-11-17 ENCOUNTER — RESULT REVIEW (OUTPATIENT)
Age: 45
End: 2020-11-17

## 2020-11-17 ENCOUNTER — APPOINTMENT (OUTPATIENT)
Dept: BARIATRICS | Facility: HOSPITAL | Age: 45
End: 2020-11-17
Payer: MEDICAID

## 2020-11-17 VITALS
SYSTOLIC BLOOD PRESSURE: 131 MMHG | HEART RATE: 84 BPM | TEMPERATURE: 98 F | OXYGEN SATURATION: 98 % | HEIGHT: 69 IN | RESPIRATION RATE: 16 BRPM | WEIGHT: 258.16 LBS | DIASTOLIC BLOOD PRESSURE: 80 MMHG

## 2020-11-17 DIAGNOSIS — I10 ESSENTIAL (PRIMARY) HYPERTENSION: ICD-10-CM

## 2020-11-17 DIAGNOSIS — Z98.89 OTHER SPECIFIED POSTPROCEDURAL STATES: Chronic | ICD-10-CM

## 2020-11-17 DIAGNOSIS — G47.30 SLEEP APNEA, UNSPECIFIED: ICD-10-CM

## 2020-11-17 DIAGNOSIS — E66.9 OBESITY, UNSPECIFIED: ICD-10-CM

## 2020-11-17 DIAGNOSIS — G47.33 OBSTRUCTIVE SLEEP APNEA (ADULT) (PEDIATRIC): ICD-10-CM

## 2020-11-17 LAB — HCG UR QL: NEGATIVE — SIGNIFICANT CHANGE UP

## 2020-11-17 PROCEDURE — 99223 1ST HOSP IP/OBS HIGH 75: CPT

## 2020-11-17 PROCEDURE — 43775 LAP SLEEVE GASTRECTOMY: CPT | Mod: AS,22

## 2020-11-17 PROCEDURE — 88307 TISSUE EXAM BY PATHOLOGIST: CPT | Mod: 26

## 2020-11-17 PROCEDURE — 43775 LAP SLEEVE GASTRECTOMY: CPT | Mod: 22

## 2020-11-17 RX ORDER — CEFAZOLIN SODIUM 1 G
2000 VIAL (EA) INJECTION ONCE
Refills: 0 | Status: COMPLETED | OUTPATIENT
Start: 2020-11-17 | End: 2020-11-17

## 2020-11-17 RX ORDER — SODIUM CHLORIDE 9 MG/ML
1000 INJECTION, SOLUTION INTRAVENOUS
Refills: 0 | Status: DISCONTINUED | OUTPATIENT
Start: 2020-11-17 | End: 2020-11-17

## 2020-11-17 RX ORDER — IBUPROFEN 200 MG
800 TABLET ORAL EVERY 6 HOURS
Refills: 0 | Status: DISCONTINUED | OUTPATIENT
Start: 2020-11-17 | End: 2020-11-18

## 2020-11-17 RX ORDER — ACETAMINOPHEN 500 MG
1000 TABLET ORAL EVERY 6 HOURS
Refills: 0 | Status: COMPLETED | OUTPATIENT
Start: 2020-11-17 | End: 2020-11-18

## 2020-11-17 RX ORDER — ACETAMINOPHEN 500 MG
1000 TABLET ORAL ONCE
Refills: 0 | Status: COMPLETED | OUTPATIENT
Start: 2020-11-17 | End: 2020-11-17

## 2020-11-17 RX ORDER — HYDROMORPHONE HYDROCHLORIDE 2 MG/ML
0.5 INJECTION INTRAMUSCULAR; INTRAVENOUS; SUBCUTANEOUS
Refills: 0 | Status: DISCONTINUED | OUTPATIENT
Start: 2020-11-17 | End: 2020-11-17

## 2020-11-17 RX ORDER — METOPROLOL TARTRATE 50 MG
5 TABLET ORAL EVERY 6 HOURS
Refills: 0 | Status: DISCONTINUED | OUTPATIENT
Start: 2020-11-17 | End: 2020-11-18

## 2020-11-17 RX ORDER — CHLORHEXIDINE GLUCONATE 213 G/1000ML
1 SOLUTION TOPICAL ONCE
Refills: 0 | Status: COMPLETED | OUTPATIENT
Start: 2020-11-17 | End: 2020-11-17

## 2020-11-17 RX ORDER — INFLUENZA VIRUS VACCINE 15; 15; 15; 15 UG/.5ML; UG/.5ML; UG/.5ML; UG/.5ML
0.5 SUSPENSION INTRAMUSCULAR ONCE
Refills: 0 | Status: DISCONTINUED | OUTPATIENT
Start: 2020-11-17 | End: 2020-11-18

## 2020-11-17 RX ORDER — METOPROLOL TARTRATE 50 MG
5 TABLET ORAL EVERY 6 HOURS
Refills: 0 | Status: DISCONTINUED | OUTPATIENT
Start: 2020-11-17 | End: 2020-11-17

## 2020-11-17 RX ORDER — ONDANSETRON 8 MG/1
4 TABLET, FILM COATED ORAL ONCE
Refills: 0 | Status: DISCONTINUED | OUTPATIENT
Start: 2020-11-17 | End: 2020-11-17

## 2020-11-17 RX ORDER — ACETAMINOPHEN 500 MG
1000 TABLET ORAL EVERY 6 HOURS
Refills: 0 | Status: DISCONTINUED | OUTPATIENT
Start: 2020-11-18 | End: 2020-11-18

## 2020-11-17 RX ORDER — APREPITANT 80 MG/1
40 CAPSULE ORAL ONCE
Refills: 0 | Status: COMPLETED | OUTPATIENT
Start: 2020-11-17 | End: 2020-11-17

## 2020-11-17 RX ORDER — ENOXAPARIN SODIUM 100 MG/ML
30 INJECTION SUBCUTANEOUS ONCE
Refills: 0 | Status: COMPLETED | OUTPATIENT
Start: 2020-11-17 | End: 2020-11-17

## 2020-11-17 RX ORDER — ACETAMINOPHEN 500 MG
1000 TABLET ORAL ONCE
Refills: 0 | Status: DISCONTINUED | OUTPATIENT
Start: 2020-11-17 | End: 2020-11-17

## 2020-11-17 RX ORDER — SODIUM CHLORIDE 9 MG/ML
2000 INJECTION, SOLUTION INTRAVENOUS
Refills: 0 | Status: DISCONTINUED | OUTPATIENT
Start: 2020-11-17 | End: 2020-11-17

## 2020-11-17 RX ADMIN — Medication 5 MILLIGRAM(S): at 23:27

## 2020-11-17 RX ADMIN — HYDROMORPHONE HYDROCHLORIDE 0.5 MILLIGRAM(S): 2 INJECTION INTRAMUSCULAR; INTRAVENOUS; SUBCUTANEOUS at 16:24

## 2020-11-17 RX ADMIN — Medication 800 MILLIGRAM(S): at 11:50

## 2020-11-17 RX ADMIN — SODIUM CHLORIDE 1000 MILLILITER(S): 9 INJECTION, SOLUTION INTRAVENOUS at 07:28

## 2020-11-17 RX ADMIN — Medication 1000 MILLIGRAM(S): at 14:47

## 2020-11-17 RX ADMIN — ONDANSETRON 4 MILLIGRAM(S): 8 TABLET, FILM COATED ORAL at 23:28

## 2020-11-17 RX ADMIN — ENOXAPARIN SODIUM 30 MILLIGRAM(S): 100 INJECTION SUBCUTANEOUS at 08:10

## 2020-11-17 RX ADMIN — Medication 400 MILLIGRAM(S): at 23:27

## 2020-11-17 RX ADMIN — SODIUM CHLORIDE 150 MILLILITER(S): 9 INJECTION, SOLUTION INTRAVENOUS at 15:53

## 2020-11-17 RX ADMIN — HYDROMORPHONE HYDROCHLORIDE 0.5 MILLIGRAM(S): 2 INJECTION INTRAMUSCULAR; INTRAVENOUS; SUBCUTANEOUS at 11:50

## 2020-11-17 RX ADMIN — SODIUM CHLORIDE 1000 MILLILITER(S): 9 INJECTION, SOLUTION INTRAVENOUS at 08:12

## 2020-11-17 RX ADMIN — Medication 516 MILLIGRAM(S): at 18:20

## 2020-11-17 RX ADMIN — SODIUM CHLORIDE 75 MILLILITER(S): 9 INJECTION, SOLUTION INTRAVENOUS at 11:43

## 2020-11-17 RX ADMIN — APREPITANT 40 MILLIGRAM(S): 80 CAPSULE ORAL at 07:26

## 2020-11-17 RX ADMIN — ENOXAPARIN SODIUM 30 MILLIGRAM(S): 100 INJECTION SUBCUTANEOUS at 20:02

## 2020-11-17 RX ADMIN — SODIUM CHLORIDE 150 MILLILITER(S): 9 INJECTION, SOLUTION INTRAVENOUS at 23:27

## 2020-11-17 RX ADMIN — HYDROMORPHONE HYDROCHLORIDE 0.5 MILLIGRAM(S): 2 INJECTION INTRAMUSCULAR; INTRAVENOUS; SUBCUTANEOUS at 12:00

## 2020-11-17 RX ADMIN — PANTOPRAZOLE SODIUM 40 MILLIGRAM(S): 20 TABLET, DELAYED RELEASE ORAL at 20:03

## 2020-11-17 RX ADMIN — Medication 5 MILLIGRAM(S): at 13:40

## 2020-11-17 RX ADMIN — Medication 516 MILLIGRAM(S): at 11:42

## 2020-11-17 RX ADMIN — CHLORHEXIDINE GLUCONATE 1 APPLICATION(S): 213 SOLUTION TOPICAL at 07:27

## 2020-11-17 RX ADMIN — ONDANSETRON 4 MILLIGRAM(S): 8 TABLET, FILM COATED ORAL at 18:12

## 2020-11-17 RX ADMIN — HYDROMORPHONE HYDROCHLORIDE 0.5 MILLIGRAM(S): 2 INJECTION INTRAMUSCULAR; INTRAVENOUS; SUBCUTANEOUS at 15:53

## 2020-11-17 RX ADMIN — Medication 800 MILLIGRAM(S): at 19:21

## 2020-11-17 RX ADMIN — Medication 400 MILLIGRAM(S): at 14:22

## 2020-11-17 NOTE — DISCHARGE NOTE PROVIDER - HOSPITAL COURSE
44 yo F with PMHx of morbid obesity admitted to [facility] for scheduled laparoscopic sleeve gastrectomy and intra-operative EGD with hiatal hernia repair.  Post operatively patient did well, good urine output and ambulating well on floor. Pt advanced to bariatric clears which she tolerated . Nutritional guidelines were reviewed with the nutritionist. Patient felt ready for discharge to home. Pt instructed to drink small frequent amounts, start protein drinks and follow dietary guidelines. Instructed to ambulate and use incentive spirometry frequently. Pt to follow up with Dr. Kelly in 1 week and call with any questions or concerns.

## 2020-11-17 NOTE — PRE-OP CHECKLIST - BLOOD AVAILABLE
Pending Prescriptions:                       Disp   Refills    alendronate (FOSAMAX) 70 MG tablet [Pharm*12 tab*3            Sig: TAKE 1 TAB EVERY 7 DAYS. TAKE IN AM AT LEAST 30           MIN BEFORE BREAKFAST. STAY UPRIGHT FOR 30 MIN           AFTER            Last Written Prescription Date: 8/17/2016  Last Fill Quantity: 12, # refills: 3  Last Office Visit with [facility], P or [facility] prescribing provider: 5/4/2017Genevieve    Next 5 appointments (look out 90 days)     Aug 02, 2017  9:20 AM CDT   SHORT with Kaitlin Vallejo, DO   [facility] ([facility])    [address] [place] [id number] [phone]                   DEXA Scan:  Last order of DX HIP/PELVIS/SPINE was found on 8/8/2016 from [facility] Appointment on 8/8/2016     No order of DX HIP/PELVIS/SPINE W LAT FRACTION ANALYSIS is found.       Creatinine   Date Value Ref Range Status   08/12/2016 0.63 0.52 - 1.04 mg/dL Final         
Will refill at visit next week, added, has supply until mid August  Rachel Soto RN, BSN  Message handled by Nurse Triage.    
n/a

## 2020-11-17 NOTE — BRIEF OPERATIVE NOTE - NSICDXBRIEFPOSTOP_GEN_ALL_CORE_FT
POST-OP DIAGNOSIS:  Hiatal hernia 17-Nov-2020 11:33:39  Lily Johnson  Peritoneal adhesions 17-Nov-2020 11:33:30  Lily Johnson  Morbid obesity 17-Nov-2020 11:33:17  Lily Johnson

## 2020-11-17 NOTE — PROGRESS NOTE ADULT - SUBJECTIVE AND OBJECTIVE BOX
physical exam at bedside  no complaints  vital signs stable  Allergic to sulfa drugs  NPO since 11/16/2020  took amlodipine and metoprolol today   history of lap banding 2012, removed 2014  LMP 2016 s/p hysterectomy

## 2020-11-17 NOTE — CONSULT NOTE ADULT - ASSESSMENT
Aftercare following surgery  gastric sleeve surgery    pain meds ibuprofen and dialudid. Monitor for respiratory depression and lethargy.  PT/OT.  DVT prophylaxis.  DVT ppx: [ ]ASA81 [ ] RJJ888 [ ] Lovenox [ ] Coumadin   [ ] Eliquis [  ] Heparin    HTN  Metoprolol with parameter. Aftercare following surgery  gastric sleeve surgery    pain meds ibuprofen and dialudid. Monitor for respiratory depression and lethargy.  PT/OT.  DVT prophylaxis.  DVT ppx: [ ]ASA81 [ ] KDI030 [ ] Lovenox [ ] Coumadin   [ ] Eliquis [  ] Heparin    HTN  Metoprolol with parameter.    Plan of care was discuss with patient, all questions were answered, seems understand and in agreement.

## 2020-11-17 NOTE — CONSULT NOTE ADULT - SUBJECTIVE AND OBJECTIVE BOX
PULMONARY/CRITICAL CARE        Patient is a 45y old  Female who presents with a chief complaint of for morbid obesity.  BRIEF HOSPITAL COURSE: ***    Events last 24 hours: ***    PAST MEDICAL & SURGICAL HISTORY:  Obesity    Anemia    Hypertension    S/P gastric surgery  lap band placement ; removed in     S/P tonsillectomy  2008    S/P       Allergies    sulfa drugs (Rash)    Intolerances      FAMILY HISTORY/ social: NP, no cigs, etoh  Family history of sinus bradycardia  mother has pacemaker    Family history of CVA  grandfather age 65    Family history of hypertension (Sibling)  sisters        Review of Systems:  CONSTITUTIONAL: No fever, chills, or fatigue  EYES: No eye pain, visual disturbances, or discharge  ENMT:  No difficulty hearing, tinnitus, vertigo; No sinus or throat pain  NECK: No pain or stiffness  RESPIRATORY: No cough, wheezing, chills or hemoptysis; No shortness of breath  CARDIOVASCULAR: No chest pain, palpitations, dizziness, or leg swelling  GASTROINTESTINAL: No abdominal or epigastric pain. No nausea, vomiting, or hematemesis; No diarrhea or constipation. No melena or hematochezia.  GENITOURINARY: No dysuria, frequency, hematuria, or incontinence  NEUROLOGICAL: No headaches, memory loss, loss of strength, numbness, or tremors  SKIN: No itching, burning, rashes, or lesions   MUSCULOSKELETAL: No joint pain or swelling; No muscle, back, or extremity pain  PSYCHIATRIC: No depression, anxiety, mood swings, or difficulty sleeping      Medications:            enoxaparin Injectable 30 milliGRAM(s) SubCutaneous once          lactated ringers. 2000 milliLiter(s) IV Continuous <Continuous>    influenza   Vaccine 0.5 milliLiter(s) IntraMuscular once              ICU Vital Signs Last 24 Hrs  T(C): 36.7 (2020 07:17), Max: 36.7 (2020 07:17)  T(F): 98.1 (2020 07:17), Max: 98.1 (2020 07:17)  HR: 84 (2020 07:17) (84 - 84)  BP: 131/80 (2020 07:17) (131/80 - 131/80)  BP(mean): --  ABP: --  ABP(mean): --  RR: 16 (2020 07:17) (16 - 16)  SpO2: 98% (:17) (98% - 98%)    Vital Signs Last 24 Hrs  T(C): 36.7 (2020 07:17), Max: 36.7 (:17)  T(F): 98.1 (:17), Max: 98.1 (2020 07:17)  HR: 84 (:) (84 - 84)  BP: 131/80 (:) (131/80 - 131/80)  BP(mean): --  RR: 16 (:17) (16 - 16)  SpO2: 98% (:) (98% - 98%)        I&O's Detail        LABS:                CAPILLARY BLOOD GLUCOSE            CULTURES:      Physical Examination:    General: No acute distress.  obese male nad.    HEENT: Pupils equal, reactive to light.  Symmetric.    PULM: Clear to auscultation bilaterally, no significant sputum production    CVS: Regular rate and rhythm, no murmurs, rubs, or gallops    ABD: Soft, nondistended, nontender, normoactive bowel sounds, no masses    EXT: No edema, nontender    SKIN: Warm and well perfused, no rashes noted.    NEURO: Alert, oriented, interactive, nonfocal    RADIOLOGY: ***    CRITICAL CARE TIME SPENT: ***

## 2020-11-17 NOTE — DISCHARGE NOTE PROVIDER - NSDCCPTREATMENT_GEN_ALL_CORE_FT
PRINCIPAL PROCEDURE  Procedure: Gastrectomy, sleeve, laparoscopic, with laparoscopic hiatal hernia repair  Findings and Treatment: Tolerated procedure well      SECONDARY PROCEDURE  Procedure: EGD  Findings and Treatment: Tolerated procedure well

## 2020-11-17 NOTE — DISCHARGE NOTE PROVIDER - CARE PROVIDER_API CALL
Lucie Kelly (MD)  Surgery  [address] [place] [address]  Phone: [phone]  Fax: [phone]  Follow Up Time:

## 2020-11-17 NOTE — CONSULT NOTE ADULT - SUBJECTIVE AND OBJECTIVE BOX
Patient is a 45y old  Female who presents with a chief complaint of "I am having a gastric sleeve" (2020 07:54)      this is a 46 y/o female who has a weight problem which is causing some medical issues; to have gastric sleeve surgery  HPI:  Patient is seen and examined.    PAST MEDICAL & SURGICAL HISTORY:  Obesity    Anemia    Hypertension    S/P gastric surgery  lap band placement ; removed in     S/P tonsillectomy  2008    S/P       MEDICATIONS  (STANDING):  influenza   Vaccine 0.5 milliLiter(s) IntraMuscular once  lactated ringers. 1000 milliLiter(s) (75 mL/Hr) IV Continuous <Continuous>  metoprolol tartrate Injectable 5 milliGRAM(s) IV Push every 6 hours    MEDICATIONS  (PRN):  HYDROmorphone  Injectable 0.5 milliGRAM(s) IV Push every 10 minutes PRN Moderate Pain (4 - 6)  ibuprofen IVPB .. 800 milliGRAM(s) IV Intermittent every 6 hours PRN Moderate Pain (4 - 6)  ondansetron Injectable 4 milliGRAM(s) IV Push once PRN Nausea and/or Vomiting      Allergies    sulfa drugs (Rash)    Intolerances    SOCIAL HISTORY:  Smoker:  YES / NO        PACK YEARS:                         WHEN QUIT?  ETOH use:  YES / NO               FREQUENCY / QUANTITY:  Ilicit Drug use:  YES / NO  Occupation:  Assisted device use (Cane / Walker):  Live with:      FAMILY HISTORY:  Family history of sinus bradycardia  mother has pacemaker    Family history of CVA  grandfather age 65    Family history of hypertension (Sibling)  sisters        Vital Signs Last 24 Hrs  T(C): 36.4 (2020 11:26), Max: 36.7 (2020 07:17)  T(F): 97.5 (2020 11:26), Max: 98.1 (2020 07:17)  HR: 80 (2020 13:00) (76 - 87)  BP: 120/67 (2020 13:00) (119/64 - 131/80)  BP(mean): --  RR: 21 (2020 13:00) (16 - 32)  SpO2: 100% (2020 13:00) (98% - 100%)             Patient is a 45y old  Female who presents with a chief complaint of "I am having a gastric sleeve" (2020 07:54)      this is a 46 y/o female who has a weight problem which is causing some medical issues; to have gastric sleeve surgery  HPI:  Patient is seen and examined.  c/o pain at surgical site.    PAST MEDICAL & SURGICAL HISTORY:  Obesity    Anemia    Hypertension    S/P gastric surgery  lap band placement ; removed in     S/P tonsillectomy  2008    S/P       MEDICATIONS  (STANDING):  influenza   Vaccine 0.5 milliLiter(s) IntraMuscular once  lactated ringers. 1000 milliLiter(s) (75 mL/Hr) IV Continuous <Continuous>  metoprolol tartrate Injectable 5 milliGRAM(s) IV Push every 6 hours    MEDICATIONS  (PRN):  HYDROmorphone  Injectable 0.5 milliGRAM(s) IV Push every 10 minutes PRN Moderate Pain (4 - 6)  ibuprofen IVPB .. 800 milliGRAM(s) IV Intermittent every 6 hours PRN Moderate Pain (4 - 6)  ondansetron Injectable 4 milliGRAM(s) IV Push once PRN Nausea and/or Vomiting      Allergies    sulfa drugs (Rash)    Intolerances    SOCIAL HISTORY:  Smoker:   NO        PACK YEARS:                         WHEN QUIT?  ETOH use:   NO               FREQUENCY / QUANTITY:  Ilicit Drug use:   NO      FAMILY HISTORY:  Family history of sinus bradycardia  mother has pacemaker    Family history of CVA  grandfather age 65    Family history of hypertension (Sibling)  sisters        Vital Signs Last 24 Hrs  T(C): 36.4 (2020 11:26), Max: 36.7 (2020 07:17)  T(F): 97.5 (2020 11:26), Max: 98.1 (2020 07:17)  HR: 80 (2020 13:00) (76 - 87)  BP: 120/67 (2020 13:00) (119/64 - 131/80)  BP(mean): --  RR: 21 (2020 13:00) (16 - 32)  SpO2: 100% (2020 13:00) (98% - 100%)

## 2020-11-17 NOTE — BRIEF OPERATIVE NOTE - NSICDXBRIEFPROCEDURE_GEN_ALL_CORE_FT
PROCEDURES:  EGD 17-Nov-2020 11:33:05  Lily Johnson  Lysis of peritoneal adhesions 17-Nov-2020 11:32:56  Lily Johnson  Laparoscopic sleeve gastrectomy with laparoscopic repair of hiatal hernia if indicated 17-Nov-2020 11:32:49  Lily Johnson

## 2020-11-17 NOTE — DISCHARGE NOTE PROVIDER - REASON FOR ADMISSION
46 yo F with PMHx of morbid obesity admitted to [facility] for scheduled laparoscopic sleeve gastrectomy and intra-operative EGD with hiatal hernia repair.

## 2020-11-17 NOTE — DISCHARGE NOTE PROVIDER - NSDCACTIVITY_GEN_ALL_CORE
No heavy lifting/straining/Stairs allowed/Walking - Indoors allowed/Walking - Outdoors allowed/Do not make important decisions/Showering allowed/Do not drive or operate machinery

## 2020-11-17 NOTE — DISCHARGE NOTE PROVIDER - NSDCCPCAREPLAN_GEN_ALL_CORE_FT
PRINCIPAL DISCHARGE DIAGNOSIS  Diagnosis: Morbid obesity  Assessment and Plan of Treatment: Instructed to ambulate and use incentive spirometry frequently. Ice packs to abdominal wall and shoulders as needed for discomfort. Cont bariatric Continue Bariatric Clear Diet . Plan to start Protein shakes at home . Avoid long heat exposure -outdoors. and follow nutritional guidelines as instructed. Pain meds as needed by MD. Pt instructed  to follow up with Dr. Kelly   in 1 week.      SECONDARY DISCHARGE DIAGNOSES  Diagnosis: S/P bariatric surgery  Assessment and Plan of Treatment: Instructed to ambulate and use incentive spirometry frequently. Ice packs to abdominal wall and shoulders as needed for discomfort. Cont bariatric Continue Bariatric Clear Diet . Plan to start Protein shakes at home . Avoid long heat exposure -outdoors. and follow nutritional guidelines as instructed. Pain meds as needed by MD. Pt instructed  to follow up with Dr. Kelly   in 1 week.

## 2020-11-17 NOTE — DISCHARGE NOTE PROVIDER - NSDCMRMEDTOKEN_GEN_ALL_CORE_FT
amLODIPine 10 mg oral tablet: 1 tab(s) orally once a day  metoprolol: 25 milligram(s) orally once a day   amLODIPine 10 mg oral tablet: 1 tab(s) orally once a day crush and put in low fat yogurt or pudding.   metoprolol: 25 milligram(s) orally once a day crush and put in low fat yogurt or pudding.   omeprazole 20 mg oral delayed release capsule: 1 cap(s) orally once a day open and put in low fat yogurt or pudding.   ondansetron 4 mg oral tablet, disintegratin tab(s) orally 3 times a day as needed for nausea   Percocet 5/325 oral tablet: 1 tab(s) orally every 6 hours as needed for moderate to severe pain crush and put in low fat yogurt or pudding.

## 2020-11-17 NOTE — DISCHARGE NOTE PROVIDER - NSDCFUSCHEDAPPT_GEN_ALL_CORE_FT
GLENDY WALKER ; 11/23/2020 ; NPP Surg Bariatric [address] ; 12/17/2020 ; NPP Surg Bariatric 221GLENDY Dozier ; 12/29/2020 ; NPP Weightmgm [address]

## 2020-11-18 ENCOUNTER — TRANSCRIPTION ENCOUNTER (OUTPATIENT)
Age: 45
End: 2020-11-18

## 2020-11-18 VITALS
TEMPERATURE: 98 F | SYSTOLIC BLOOD PRESSURE: 127 MMHG | RESPIRATION RATE: 14 BRPM | HEART RATE: 72 BPM | DIASTOLIC BLOOD PRESSURE: 74 MMHG | OXYGEN SATURATION: 97 %

## 2020-11-18 DIAGNOSIS — Z98.84 BARIATRIC SURGERY STATUS: ICD-10-CM

## 2020-11-18 DIAGNOSIS — E66.01 MORBID (SEVERE) OBESITY DUE TO EXCESS CALORIES: ICD-10-CM

## 2020-11-18 LAB
ANION GAP SERPL CALC-SCNC: 8 MMOL/L — SIGNIFICANT CHANGE UP (ref 5–17)
BUN SERPL-MCNC: 6 MG/DL — LOW (ref 7–23)
CALCIUM SERPL-MCNC: 8.8 MG/DL — SIGNIFICANT CHANGE UP (ref 8.4–10.5)
CHLORIDE SERPL-SCNC: 105 MMOL/L — SIGNIFICANT CHANGE UP (ref 96–108)
CO2 SERPL-SCNC: 25 MMOL/L — SIGNIFICANT CHANGE UP (ref 22–31)
CREAT SERPL-MCNC: 0.57 MG/DL — SIGNIFICANT CHANGE UP (ref 0.5–1.3)
GLUCOSE SERPL-MCNC: 107 MG/DL — HIGH (ref 70–99)
HCT VFR BLD CALC: 36.3 % — SIGNIFICANT CHANGE UP (ref 34.5–45)
HGB BLD-MCNC: 12 G/DL — SIGNIFICANT CHANGE UP (ref 11.5–15.5)
MCHC RBC-ENTMCNC: 31.4 PG — SIGNIFICANT CHANGE UP (ref 27–34)
MCHC RBC-ENTMCNC: 33.1 GM/DL — SIGNIFICANT CHANGE UP (ref 32–36)
MCV RBC AUTO: 95 FL — SIGNIFICANT CHANGE UP (ref 80–100)
NRBC # BLD: 0 /100 WBCS — SIGNIFICANT CHANGE UP (ref 0–0)
PLATELET # BLD AUTO: 274 K/UL — SIGNIFICANT CHANGE UP (ref 150–400)
POTASSIUM SERPL-MCNC: 3.6 MMOL/L — SIGNIFICANT CHANGE UP (ref 3.5–5.3)
POTASSIUM SERPL-SCNC: 3.6 MMOL/L — SIGNIFICANT CHANGE UP (ref 3.5–5.3)
RBC # BLD: 3.82 M/UL — SIGNIFICANT CHANGE UP (ref 3.8–5.2)
RBC # FLD: 12.8 % — SIGNIFICANT CHANGE UP (ref 10.3–14.5)
SODIUM SERPL-SCNC: 138 MMOL/L — SIGNIFICANT CHANGE UP (ref 135–145)
WBC # BLD: 15.92 K/UL — HIGH (ref 3.8–10.5)
WBC # FLD AUTO: 15.92 K/UL — HIGH (ref 3.8–10.5)

## 2020-11-18 PROCEDURE — 80048 BASIC METABOLIC PNL TOTAL CA: CPT

## 2020-11-18 PROCEDURE — 86850 RBC ANTIBODY SCREEN: CPT

## 2020-11-18 PROCEDURE — 86900 BLOOD TYPING SEROLOGIC ABO: CPT

## 2020-11-18 PROCEDURE — C1889: CPT

## 2020-11-18 PROCEDURE — 81025 URINE PREGNANCY TEST: CPT

## 2020-11-18 PROCEDURE — 88307 TISSUE EXAM BY PATHOLOGIST: CPT

## 2020-11-18 PROCEDURE — 85027 COMPLETE CBC AUTOMATED: CPT

## 2020-11-18 PROCEDURE — 94660 CPAP INITIATION&MGMT: CPT

## 2020-11-18 PROCEDURE — 36415 COLL VENOUS BLD VENIPUNCTURE: CPT

## 2020-11-18 PROCEDURE — 86901 BLOOD TYPING SEROLOGIC RH(D): CPT

## 2020-11-18 PROCEDURE — 99232 SBSQ HOSP IP/OBS MODERATE 35: CPT

## 2020-11-18 PROCEDURE — 94664 DEMO&/EVAL PT USE INHALER: CPT

## 2020-11-18 PROCEDURE — 94760 N-INVAS EAR/PLS OXIMETRY 1: CPT

## 2020-11-18 RX ORDER — METOPROLOL TARTRATE 50 MG
25 TABLET ORAL
Qty: 0 | Refills: 0 | DISCHARGE

## 2020-11-18 RX ORDER — AMLODIPINE BESYLATE 2.5 MG/1
1 TABLET ORAL
Qty: 0 | Refills: 0 | DISCHARGE

## 2020-11-18 RX ADMIN — HYDROMORPHONE HYDROCHLORIDE 0.5 MILLIGRAM(S): 2 INJECTION INTRAMUSCULAR; INTRAVENOUS; SUBCUTANEOUS at 12:41

## 2020-11-18 RX ADMIN — HYDROMORPHONE HYDROCHLORIDE 0.5 MILLIGRAM(S): 2 INJECTION INTRAMUSCULAR; INTRAVENOUS; SUBCUTANEOUS at 12:57

## 2020-11-18 RX ADMIN — Medication 1000 MILLIGRAM(S): at 05:44

## 2020-11-18 RX ADMIN — Medication 400 MILLIGRAM(S): at 05:22

## 2020-11-18 RX ADMIN — Medication 1000 MILLIGRAM(S): at 00:43

## 2020-11-18 RX ADMIN — Medication 800 MILLIGRAM(S): at 08:45

## 2020-11-18 RX ADMIN — Medication 1000 MILLIGRAM(S): at 11:40

## 2020-11-18 RX ADMIN — HYDROMORPHONE HYDROCHLORIDE 0.5 MILLIGRAM(S): 2 INJECTION INTRAMUSCULAR; INTRAVENOUS; SUBCUTANEOUS at 00:03

## 2020-11-18 RX ADMIN — SODIUM CHLORIDE 150 MILLILITER(S): 9 INJECTION, SOLUTION INTRAVENOUS at 05:22

## 2020-11-18 RX ADMIN — ONDANSETRON 4 MILLIGRAM(S): 8 TABLET, FILM COATED ORAL at 12:34

## 2020-11-18 RX ADMIN — ONDANSETRON 4 MILLIGRAM(S): 8 TABLET, FILM COATED ORAL at 05:22

## 2020-11-18 RX ADMIN — ENOXAPARIN SODIUM 30 MILLIGRAM(S): 100 INJECTION SUBCUTANEOUS at 08:28

## 2020-11-18 RX ADMIN — Medication 400 MILLIGRAM(S): at 11:20

## 2020-11-18 RX ADMIN — Medication 516 MILLIGRAM(S): at 08:28

## 2020-11-18 RX ADMIN — Medication 5 MILLIGRAM(S): at 05:22

## 2020-11-18 RX ADMIN — Medication 5 MILLIGRAM(S): at 12:34

## 2020-11-18 RX ADMIN — Medication 800 MILLIGRAM(S): at 15:50

## 2020-11-18 RX ADMIN — HYDROMORPHONE HYDROCHLORIDE 0.5 MILLIGRAM(S): 2 INJECTION INTRAMUSCULAR; INTRAVENOUS; SUBCUTANEOUS at 00:22

## 2020-11-18 RX ADMIN — Medication 516 MILLIGRAM(S): at 15:28

## 2020-11-18 RX ADMIN — PANTOPRAZOLE SODIUM 40 MILLIGRAM(S): 20 TABLET, DELAYED RELEASE ORAL at 12:34

## 2020-11-18 NOTE — PROGRESS NOTE ADULT - ASSESSMENT
46 yo female is scheduled for laparoscopic sleeve gastrectomy today at [facility]
POD#1 s/p sleeve gastrectomy  - Pain control  - Bowel regimen  - Incentive Spirometry  - Advance diet and VTE PPx per surgery    HTN  - resume metoprolol and amlodipine when able to take pills    DAVID  - uses CPAP at home  - used CPAP overnight  
Pt. stable postop gastric sleeve.   Advised ambulate, incentive abhijit.  FU pulmonologist for cpap.   Use cpap hs.
45 year old Female with history of lap band and lap band removal now POD #1 s/p Laparoscopic sleeve gastrectomy with laparoscopic repair of hiatal hernia, lysis of peritoneal adhesions and intraoperative EGD is hemodynamically stable and doing well. Will monitor for spontaneous voiding.

## 2020-11-18 NOTE — PROGRESS NOTE ADULT - SUBJECTIVE AND OBJECTIVE BOX
Pre-Op Dx: Morbid obesity  Procedure: EGD    Lysis of peritoneal adhesions    Laparoscopic sleeve gastrectomy with laparoscopic repair of hiatal hernia if indicated  Surgeon: Leticia    HPI:   45y year old Female s/p EGD    Lysis of peritoneal adhesions    Laparoscopic sleeve gastrectomy with laparoscopic repair of hiatal hernia if indicated     POD #  Patient is ambulating on the floor and utilizing incentive spirometry. She is tolerating clear liquid diet and urinating well. Minimal incisional discomfort. Denies any nausea or vomiting. Pt seen and examined at the bedside.     VITALS:  Vital Signs Last 24 Hrs  T(C): 37.1 (18 Nov 2020 07:56), Max: 37.2 (17 Nov 2020 19:16)  T(F): 98.8 (18 Nov 2020 07:56), Max: 98.9 (17 Nov 2020 19:16)  HR: 79 (18 Nov 2020 07:56) (70 - 93)  BP: 134/74 (18 Nov 2020 07:56) (111/71 - 147/78)  BP(mean): --  RR: 16 (18 Nov 2020 07:56) (14 - 32)  SpO2: 96% (18 Nov 2020 07:56) (95% - 100%)    11-17 @ 07:01  -  11-18 @ 07:00  --------------------------------------------------------  IN: 4475 mL / OUT: 3775 mL / NET: 700 mL    11-18 @ 07:01  -  11-18 @ 08:52  --------------------------------------------------------  IN: 0 mL / OUT: 600 mL / NET: -600 mL        LABS:                        12.0   15.92 )-----------( 274      ( 18 Nov 2020 06:21 )             36.3     11-18    138  |  105  |  6<L>  ----------------------------<  107<H>  3.6   |  25  |  0.57    Ca    8.8      18 Nov 2020 06:21        Physical Exam:  General: A/O x 3, NAD, resting comfortably in bed  Abdominal: soft, ND, nontender to palpation, incisions C/D/I  Drains:   Extremities: no edema, no calf tenderness B/L    Radiology and Additional Studies:       Pre-Op Dx: Morbid obesity  Procedure: Laparoscopic sleeve gastrectomy with laparoscopic repair of hiatal hernia , lysis of peritoneal adhesions and intraoperative EGD  Surgeon: Leticia    HPI:   45 year old Female with history of lap band and lap band removal now s/p Laparoscopic sleeve gastrectomy with laparoscopic repair of hiatal hernia , lysis of peritoneal adhesions and intraoperative EGD POD # 1. Patient is ambulating on the floor and utilizing incentive spirometry. She is tolerating clear liquid diet. Lazaro catheter removed this morning. Did not void yet. Minimal incisional discomfort. Denies any nausea or vomiting. Pt seen and examined at the bedside.     VITALS:  Vital Signs Last 24 Hrs  T(C): 37.1 (18 Nov 2020 07:56), Max: 37.2 (17 Nov 2020 19:16)  T(F): 98.8 (18 Nov 2020 07:56), Max: 98.9 (17 Nov 2020 19:16)  HR: 79 (18 Nov 2020 07:56) (70 - 93)  BP: 134/74 (18 Nov 2020 07:56) (111/71 - 147/78)  BP(mean): --  RR: 16 (18 Nov 2020 07:56) (14 - 32)  SpO2: 96% (18 Nov 2020 07:56) (95% - 100%)    11-17 @ 07:01  -  11-18 @ 07:00  --------------------------------------------------------  IN: 4475 mL / OUT: 3775 mL / NET: 700 mL    11-18 @ 07:01  -  11-18 @ 08:52  --------------------------------------------------------  IN: 0 mL / OUT: 600 mL / NET: -600 mL        LABS:                        12.0   15.92 )-----------( 274      ( 18 Nov 2020 06:21 )             36.3     11-18    138  |  105  |  6<L>  ----------------------------<  107<H>  3.6   |  25  |  0.57    Ca    8.8      18 Nov 2020 06:21        Physical Exam:  General: A/O x 3, NAD, sitting up comfortably in bed  Abdominal: soft, ND, nontender to palpation, incisions C/D/I  Extremities: no edema, no calf tenderness B/L

## 2020-11-18 NOTE — PROGRESS NOTE ADULT - SUBJECTIVE AND OBJECTIVE BOX
INTERVAL HPI/OVERNIGHT EVENTS:   Patient seen and examined.  Tolerating clears, no nausea.  No flatus or BM yet.  Diffuse abdominal pain.  No fevers, chills, sweats, dizziness, HA, changes in vision, cp, palpitations, sob, persistent cough, diarrhea, dysuria, focal weakness, or calf pain.     REVIEW OF SYSTEMS:  See HPI,  all others negative    PHYSICAL EXAM:  Vital Signs Last 24 Hrs  T(C): 37.1 (2020 07:56), Max: 37.2 (2020 19:16)  T(F): 98.8 (2020 07:56), Max: 98.9 (2020 19:16)  HR: 79 (2020 07:56) (70 - 93)  BP: 134/74 (2020 07:56) (111/71 - 147/78)  BP(mean): --  RR: 16 (2020 07:56) (14 - 32)  SpO2: 96% (2020 07:56) (95% - 100%)    GENERAL: NAD, well-groomed, well-developed, awake, alert, oriented x 3, fluent and coherent speech  EYES: EOMI, PERRLA, conjunctiva and sclera clear  ENMT: No tonsillar erythema, exudates, or enlargement; Moist mucous membranes, Good dentition, No lesions seen on oral mucosa  NECK: Supple, No JVD, No Cervical LAD, No thyromegaly, No thyroid nodules felt  NERVOUS SYSTEM:  Good concentration; Moving all 4 extremities against gravity and resistance; No gross sensory deficits, No facial droop  CHEST/LUNG: Clear to auscultation bilaterally with good air entry; No rales, rhonchi, wheezing, or rubs  HEART: Regular rate and rhythm; No murmurs, rubs, or gallops  ABDOMEN: Soft, obese, diffuse tenderness to palpation, Bowel sounds heard over all 4 quadrants, but mimimal, No palpable masses or organomegaly, No bruits  EXTREMITIES:  2+ Peripheral Pulses, No clubbing, cyanosis, or edema, no calf tenderness in either leg    Diagnostic Testin.0   15.92 )-----------( 274      ( 2020 06:21 )             36.3     2020 06:21    138    |  105    |  6      ----------------------------<  107    3.6     |  25     |  0.57     Ca    8.8        2020 06:21

## 2020-11-18 NOTE — PROGRESS NOTE ADULT - SUBJECTIVE AND OBJECTIVE BOX
PULMONARY/CRITICAL CARE    DOS 20  Doing well postop. Ambulated. Mild abd pain. Wore cpap hs. No sob.     Patient is a 45y old  Female who presents with a chief complaint of for morbid obesity.  BRIEF HOSPITAL COURSE: ***    Events last 24 hours: ***    PAST MEDICAL & SURGICAL HISTORY:  Obesity    Anemia    Hypertension    S/P gastric surgery  lap band placement ; removed in     S/P tonsillectomy  2008    S/P       Allergies    sulfa drugs (Rash)    Intolerances      FAMILY HISTORY/ social: NP, no cigs, etoh  Family history of sinus bradycardia  mother has pacemaker    Family history of CVA  grandfather age 65    Family history of hypertension (Sibling)  sisters        Medications:            enoxaparin Injectable 30 milliGRAM(s) SubCutaneous once          lactated ringers. 2000 milliLiter(s) IV Continuous <Continuous>    influenza   Vaccine 0.5 milliLiter(s) IntraMuscular once              ICU Vital Signs Last 24 Hrs  T(C): 36.7 (2020 07:17), Max: 36.7 (2020 07:17)  T(F): 98.1 (2020 07:17), Max: 98.1 (2020 07:17)  HR: 84 (2020 07:17) (84 - 84)  BP: 131/80 (2020 07:17) (131/80 - 131/80)  BP(mean): --  ABP: --  ABP(mean): --  RR: 16 (2020 07:17) (16 - 16)  SpO2: 98% (:17) (98% - 98%)    Vital Signs Last 24 Hrs  T(C): 36.7 (2020 07:17), Max: 36.7 (2020 07:17)  T(F): 98.1 (2020 07:17), Max: 98.1 (2020 07:17)  HR: 84 (2020 07:17) (84 - 84)  BP: 131/80 (2020 07:17) (131/80 - 131/80)  BP(mean): --  RR: 16 (2020 07:17) (16 - 16)  SpO2: 98% (:17) (98% - 98%)        I&O's Detail        LABS:                CAPILLARY BLOOD GLUCOSE            CULTURES:      Physical Examination:    General: No acute distress.  obese male nad.    HEENT: Pupils equal, reactive to light.  Symmetric.    PULM: Clear to auscultation bilaterally, no significant sputum production    CVS: Regular rate and rhythm, no murmurs, rubs, or gallops    ABD: Soft, nondistended, mildly tender, decreased bowel sounds, no masses    EXT: No edema, nontender    SKIN: Warm and well perfused, no rashes noted.    NEURO: Alert, oriented, interactive, nonfocal    RADIOLOGY: ***    CRITICAL CARE TIME SPENT: ***

## 2020-11-18 NOTE — PROGRESS NOTE ADULT - REASON FOR ADMISSION
44 yo F with PMHx of morbid obesity admitted to [facility] for scheduled laparoscopic sleeve gastrectomy and intra-operative EGD with hiatal hernia repair.  (17 Nov 2020 15:32)

## 2020-11-18 NOTE — DISCHARGE NOTE NURSING/CASE MANAGEMENT/SOCIAL WORK - PATIENT PORTAL LINK FT
You can access the FollowMyHealth Patient Portal offered by [facility] by registering at the following website: [email or web address]. By joining [facility]’s FollowMyHealth portal, you will also be able to view your health information using other applications (apps) compatible with our system.

## 2020-11-19 ENCOUNTER — NON-APPOINTMENT (OUTPATIENT)
Age: 45
End: 2020-11-19

## 2020-11-19 LAB — SURGICAL PATHOLOGY STUDY: SIGNIFICANT CHANGE UP

## 2020-11-20 ENCOUNTER — NON-APPOINTMENT (OUTPATIENT)
Age: 45
End: 2020-11-20

## 2020-11-23 ENCOUNTER — APPOINTMENT (OUTPATIENT)
Dept: BARIATRICS | Facility: CLINIC | Age: 45
End: 2020-11-23
Payer: MEDICAID

## 2020-11-23 VITALS
WEIGHT: 255.07 LBS | BODY MASS INDEX: 38.66 KG/M2 | OXYGEN SATURATION: 98 % | TEMPERATURE: 97.3 F | HEART RATE: 84 BPM | HEIGHT: 68 IN

## 2020-11-23 DIAGNOSIS — Z87.898 PERSONAL HISTORY OF OTHER SPECIFIED CONDITIONS: ICD-10-CM

## 2020-11-23 PROCEDURE — 99024 POSTOP FOLLOW-UP VISIT: CPT

## 2020-11-23 RX ORDER — OXYCODONE AND ACETAMINOPHEN 5; 325 MG/1; MG/1
5-325 TABLET ORAL EVERY 8 HOURS
Qty: 6 | Refills: 0 | Status: DISCONTINUED | COMMUNITY
Start: 2020-11-20 | End: 2020-11-23

## 2020-11-23 RX ORDER — ONDANSETRON 4 MG/1
4 TABLET, ORALLY DISINTEGRATING ORAL 4 TIMES DAILY
Qty: 15 | Refills: 0 | Status: DISCONTINUED | COMMUNITY
Start: 2020-11-12 | End: 2020-11-23

## 2020-11-23 RX ORDER — OXYCODONE AND ACETAMINOPHEN 5; 325 MG/1; MG/1
5-325 TABLET ORAL EVERY 8 HOURS
Qty: 6 | Refills: 0 | Status: DISCONTINUED | COMMUNITY
Start: 2020-11-12 | End: 2020-11-23

## 2020-11-23 NOTE — HISTORY OF PRESENT ILLNESS
[Procedure: ___] : Procedure performed: [unfilled]  [Date of Surgery: ___] : Date of Surgery:   [unfilled] [Surgeon Name:   ___] : Surgeon Name: Dr. CAPONE [Pre-Op Weight ___] : Pre-op weight was [unfilled] lbs [de-identified] : 45 year old female with history of lap band and lap band removal now one week s/p laparoscopic sleeve gastrectomy and hiatal hernia repair presents today for first post operative visit. Patient lost 6 lbs since last visit. She is consuming the appropriate amount of protein and fluids daily. She takes omeprazole daily. Last week, she requested additional pain medication for incisional pain, which has since improved. Patient denies abdominal pain, acid reflux symptoms, nausea, vomiting, diarrhea and constipation. She complains of hoarse voice and throat pain since surgery. Pain medication temporarily relieved the throat pain. She is ambulating frequently for exercise.  [de-identified] : Lap Band , Date of Surgery: 2009, Surgeon Name: Dr. Dorantes . Pre-op weight was 255 lbs.\par Laparoscopic removal of Lap Band and port secondary to dysphagia, 2014, Dr. Kelly

## 2020-11-23 NOTE — ASSESSMENT
[FreeTextEntry1] : 45 year old female with history of lap band and lap band removal now one week s/p laparoscopic sleeve gastrectomy and hiatal hernia repair presents for first post operative visit. She is doing well and losing weight. Incisions are healing appropriately. The patient was encouraged to continue a liquid low fat, low carbohydrate and high protein diet for another week and then progress to soft/pureed foods as tolerated.  Patient was advised to increase ambulation and not to do any heavy lifting until 6 weeks post op. Patient had in person interview today and was strongly advised to take necessary COVID related precautions. \par \par Nutrition and exercise counseling provided.\par Start Vitamins and continue omeprazole\par Post op nutrition classes and nutritionist appointment\par Follow up in 3 weeks\par Call with any questions or concerns\par

## 2020-11-23 NOTE — PHYSICAL EXAM
[Obese, well nourished, in no acute distress] : obese, well nourished, in no acute distress [Normal] : affect appropriate [de-identified] : EOMI, anicteric

## 2020-11-23 NOTE — DATA REVIEWED
[FreeTextEntry1] : Total opioids used       10         total # taken (oxycodone)\par \par MSO4 equivalent         75         mg (total # taken). \par \par Unused opioids returned?   NA\par \par Additional opioid refill?   Yes\par \par 30 -day post opioid use? NA

## 2020-11-23 NOTE — REVIEW OF SYSTEMS
[Recent Change In Weight] : ~T recent weight change [Hoarseness] : hoarseness [Odynophagia] : odynophagia [Negative] : Psychiatric [Fever] : no fever [Chills] : no chills [Night Sweats] : no night sweats [Fatigue] : no fatigue [Eye Pain] : no eye pain [Red Eyes] : eyes not red [Vision Problems] : no vision problems [Dysphagia] : no dysphagia [Loss of Hearing] : no loss of hearing [Nosebleeds] : no nosebleeds [FreeTextEntry2] : weight loss

## 2020-12-04 PROBLEM — E66.9 OBESITY, UNSPECIFIED: Chronic | Status: ACTIVE | Noted: 2020-11-10

## 2020-12-16 ENCOUNTER — APPOINTMENT (OUTPATIENT)
Dept: INTERNAL MEDICINE | Facility: CLINIC | Age: 45
End: 2020-12-16
Payer: MEDICAID

## 2020-12-16 VITALS
SYSTOLIC BLOOD PRESSURE: 120 MMHG | WEIGHT: 242 LBS | HEIGHT: 68 IN | TEMPERATURE: 98 F | DIASTOLIC BLOOD PRESSURE: 70 MMHG | BODY MASS INDEX: 36.68 KG/M2

## 2020-12-16 PROCEDURE — 99072 ADDL SUPL MATRL&STAF TM PHE: CPT

## 2020-12-16 PROCEDURE — 99214 OFFICE O/P EST MOD 30 MIN: CPT

## 2020-12-16 NOTE — PLAN
[FreeTextEntry1] : PT HERE FOR   EXAM FEELS  OK   H AS  COMPLETED HER NP\par ALSO HAS  HAD  RECENT BARIATRIC SURGERY  ABOUT A MONTH AGO BUT HAS HOARSENESS SINCE THE PROCEDURE A LITTLE BETTER BUT VOICE IS NOT BACK TO NORMAL AS PER PATIENT\par WILL REFER TO DR AMAYA FOR ENT FORMAL EVAL WITH FIBEROPTIC SCOPE\par WILL FOLLLOW UP \par BP IS OK HSA LOST 20LBS ALREADY

## 2020-12-16 NOTE — HISTORY OF PRESENT ILLNESS
[FreeTextEntry1] : FOLLOWUP ON  BARIATRIC PLANS  AND BP  [de-identified] : PT HERE FOR   EXAM FEELS  OK   H AS  COMPLETED HER NP\par ALSO HAS  HAD  RECENT BARIATRIC SURGERY  ABOUT A MONTH AGO BUT HAS HOARSENESS SINCE THE PROCEDURE A LITTLE BETTER BUT VOICE IS NOT BACK TO NORMAL AS PER PATIENT

## 2020-12-17 ENCOUNTER — APPOINTMENT (OUTPATIENT)
Dept: BARIATRICS | Facility: CLINIC | Age: 45
End: 2020-12-17
Payer: MEDICAID

## 2020-12-17 VITALS — HEIGHT: 68 IN | WEIGHT: 240 LBS | BODY MASS INDEX: 36.37 KG/M2

## 2020-12-17 DIAGNOSIS — Z98.84 BARIATRIC SURGERY STATUS: ICD-10-CM

## 2020-12-17 DIAGNOSIS — R63.4 ABNORMAL WEIGHT LOSS: ICD-10-CM

## 2020-12-17 PROCEDURE — 99024 POSTOP FOLLOW-UP VISIT: CPT

## 2020-12-28 ENCOUNTER — APPOINTMENT (OUTPATIENT)
Dept: INTERNAL MEDICINE | Facility: CLINIC | Age: 45
End: 2020-12-28
Payer: MEDICAID

## 2020-12-28 VITALS
WEIGHT: 240 LBS | TEMPERATURE: 98.2 F | BODY MASS INDEX: 36.37 KG/M2 | DIASTOLIC BLOOD PRESSURE: 84 MMHG | SYSTOLIC BLOOD PRESSURE: 123 MMHG | HEIGHT: 68 IN

## 2020-12-28 DIAGNOSIS — R21 RASH AND OTHER NONSPECIFIC SKIN ERUPTION: ICD-10-CM

## 2020-12-28 PROCEDURE — 99072 ADDL SUPL MATRL&STAF TM PHE: CPT

## 2020-12-28 PROCEDURE — 99214 OFFICE O/P EST MOD 30 MIN: CPT

## 2020-12-28 RX ORDER — TERCONAZOLE 4 MG/G
0.4 CREAM VAGINAL
Qty: 45 | Refills: 0 | Status: COMPLETED | COMMUNITY
Start: 2020-07-22

## 2020-12-28 NOTE — PHYSICAL EXAM
[No Acute Distress] : no acute distress [Well Nourished] : well nourished [Well Developed] : well developed [Well-Appearing] : well-appearing [Normal Sclera/Conjunctiva] : normal sclera/conjunctiva [PERRL] : pupils equal round and reactive to light [EOMI] : extraocular movements intact [Normal Outer Ear/Nose] : the outer ears and nose were normal in appearance [Normal Oropharynx] : the oropharynx was normal [No JVD] : no jugular venous distention [No Lymphadenopathy] : no lymphadenopathy [Supple] : supple [Thyroid Normal, No Nodules] : the thyroid was normal and there were no nodules present [No Respiratory Distress] : no respiratory distress  [No Accessory Muscle Use] : no accessory muscle use [Clear to Auscultation] : lungs were clear to auscultation bilaterally [Normal Rate] : normal rate  [Regular Rhythm] : with a regular rhythm [Normal S1, S2] : normal S1 and S2 [No Murmur] : no murmur heard [No Carotid Bruits] : no carotid bruits [No Abdominal Bruit] : a ~M bruit was not heard ~T in the abdomen [No Varicosities] : no varicosities [Pedal Pulses Present] : the pedal pulses are present [No Edema] : there was no peripheral edema [No Palpable Aorta] : no palpable aorta [No Extremity Clubbing/Cyanosis] : no extremity clubbing/cyanosis [Soft] : abdomen soft [Non Tender] : non-tender [Non-distended] : non-distended [No Masses] : no abdominal mass palpated [No HSM] : no HSM [Normal Bowel Sounds] : normal bowel sounds [Normal Posterior Cervical Nodes] : no posterior cervical lymphadenopathy [Normal Anterior Cervical Nodes] : no anterior cervical lymphadenopathy [No CVA Tenderness] : no CVA  tenderness [No Spinal Tenderness] : no spinal tenderness [No Joint Swelling] : no joint swelling [Grossly Normal Strength/Tone] : grossly normal strength/tone [Coordination Grossly Intact] : coordination grossly intact [No Focal Deficits] : no focal deficits [Normal Gait] : normal gait [Normal Affect] : the affect was normal [Normal Insight/Judgement] : insight and judgment were intact [de-identified] : erythema bilateral axilla with excoriation marks.

## 2020-12-28 NOTE — PLAN
[FreeTextEntry1] : Rash- likely allergic- will prescribe antifungal/topical steroid combo cream \par \par Form completed for patient at time of visit.\par \par patient's BP well controlled with current medication. will continue current  regimen \par \par Counseling included abnormal lab results, differential diagnoses, treatment options, risks and benefits, lifestyle changes, current condition, medications, and dose adjustments. \par The patient was interactive, attentive, asked questions, and verbalized understanding

## 2020-12-28 NOTE — HISTORY OF PRESENT ILLNESS
[FreeTextEntry1] : forms completion [de-identified] : Ms. GLENDY WALKER is a 45 year female with a PMH of HTN comes to the office for follow up of medical conditions and form completion. Patient also complains of rash under armpits bilaterally after using new deodorant. Patient denies fever, cough SOB. No other complaints at this time.

## 2020-12-29 ENCOUNTER — APPOINTMENT (OUTPATIENT)
Dept: BARIATRICS/WEIGHT MGMT | Facility: CLINIC | Age: 45
End: 2020-12-29
Payer: MEDICAID

## 2020-12-29 VITALS — BODY MASS INDEX: 36.37 KG/M2 | WEIGHT: 240 LBS | HEIGHT: 68 IN

## 2020-12-29 DIAGNOSIS — E66.9 OBESITY, UNSPECIFIED: ICD-10-CM

## 2020-12-29 PROCEDURE — 97803 MED NUTRITION INDIV SUBSEQ: CPT | Mod: 95

## 2020-12-29 NOTE — ASSESSMENT
[FreeTextEntry1] : 46 yo  with history of lap band and lap band removal  s/p 1 month  s/p laparoscopic sleeve gastrectomy and hiatal hernia repair presents today for a follow up TELEMEDICINE VISIT due to COVID-19 Pandemic. Current weight is 240 lbs. Weight loss since last visit. DAVID on CPAP \par \par \par \par Nutrition and exercise counseling provided.\par Will continue Vitamins and daily medication as directed. Will continue daily PPI - assess after seeing ENT> \par Post op nutrition classes and nutritionist appointment scheduled 12/29/2020\par Follow up at 3 month post op visit. Plan to have routine blood work performed prior to visit \par Call with any questions or concerns\par

## 2020-12-29 NOTE — REVIEW OF SYSTEMS
[Recent Change In Weight] : ~T recent weight change [Hoarseness] : hoarseness [Odynophagia] : odynophagia [Negative] : Psychiatric [Fever] : no fever [Chills] : no chills [Night Sweats] : no night sweats [Fatigue] : no fatigue [Eye Pain] : no eye pain [Red Eyes] : eyes not red [Vision Problems] : no vision problems [Dysphagia] : no dysphagia [Loss of Hearing] : no loss of hearing [Nosebleeds] : no nosebleeds [FreeTextEntry2] : weight loss [FreeTextEntry4] : plan to schedule visit with ENT within next 1-2 weeks

## 2020-12-29 NOTE — PHYSICAL EXAM
[Obese, well nourished, in no acute distress] : obese, well nourished, in no acute distress [Normal] : full range of motion and no deformities appreciated [de-identified] : EOMI, anicteric

## 2020-12-29 NOTE — HISTORY OF PRESENT ILLNESS
[Procedure: ___] : Procedure performed: [unfilled]  [Surgeon Name:   ___] : Surgeon Name: Dr. CAPONE [Pre-Op Weight ___] : Pre-op weight was [unfilled] lbs [Home] : at home, [unfilled] , at the time of the visit. [Medical Office: ([facility])___] : at the medical office located in  [Verbal consent obtained from patient] : the patient, [unfilled] [Date of Surgery: ___] : Date of Surgery:   [unfilled] [de-identified] : 46 yo  with history of lap band and lap band removal  s/p 1 month  s/p laparoscopic sleeve gastrectomy and hiatal hernia repair presents today for a follow up TELEMEDICINE VISIT due to COVID-19 Pandemic. Current weight is 240 lbs. Weight loss since last visit. She is consuming the appropriate amount of protein and fluids daily. She continues to take omeprazole daily. Patient denies abdominal pain, acid reflux symptoms, nausea, vomiting, diarrhea and constipation. Pt continues to complains of hoarse voice and throat pain since surgery. Pt recently saw PCP and  received a referral to see an ENT. Plan to see ENT within the next few weeks. Pt denies any dysphagia when drinking or eating.  She is ambulating frequently for exercise.  [de-identified] : Lap Band , Date of Surgery: 2009, Surgeon Name: Dr. Dorantes . Pre-op weight was 255 lbs.\par Laparoscopic removal of Lap Band and port secondary to dysphagia, 2014, Dr. Kelly

## 2021-01-06 LAB
25(OH)D3 SERPL-MCNC: 59.8 NG/ML
ALBUMIN SERPL ELPH-MCNC: 4.3 G/DL
ALP BLD-CCNC: 73 U/L
ALT SERPL-CCNC: 18 U/L
ANION GAP SERPL CALC-SCNC: 11 MMOL/L
AST SERPL-CCNC: 16 U/L
BASOPHILS # BLD AUTO: 0.03 K/UL
BASOPHILS NFR BLD AUTO: 0.4 %
BILIRUB SERPL-MCNC: 0.7 MG/DL
BUN SERPL-MCNC: 11 MG/DL
CALCIUM SERPL-MCNC: 9.2 MG/DL
CHLORIDE SERPL-SCNC: 105 MMOL/L
CHOLEST SERPL-MCNC: 150 MG/DL
CHOLEST/HDLC SERPL: 3.3 RATIO
CO2 SERPL-SCNC: 23 MMOL/L
CREAT SERPL-MCNC: 0.57 MG/DL
EOSINOPHIL # BLD AUTO: 0.26 K/UL
EOSINOPHIL NFR BLD AUTO: 3.3 %
FERRITIN SERPL-MCNC: 299 NG/ML
FOLATE SERPL-MCNC: 12 NG/ML
GLUCOSE SERPL-MCNC: 92 MG/DL
HCT VFR BLD CALC: 39.6 %
HDLC SERPL-MCNC: 45 MG/DL
HGB BLD-MCNC: 12.7 G/DL
IMM GRANULOCYTES NFR BLD AUTO: 0.3 %
INR PPP: 0.96 RATIO
IRON SATN MFR SERPL: 43 %
IRON SERPL-MCNC: 126 UG/DL
LDLC SERPL CALC-MCNC: 90 MG/DL
LYMPHOCYTES # BLD AUTO: 1.74 K/UL
LYMPHOCYTES NFR BLD AUTO: 21.9 %
MAN DIFF?: NORMAL
MCHC RBC-ENTMCNC: 31.7 PG
MCHC RBC-ENTMCNC: 32.1 GM/DL
MCV RBC AUTO: 98.8 FL
MONOCYTES # BLD AUTO: 0.66 K/UL
MONOCYTES NFR BLD AUTO: 8.3 %
NEUTROPHILS # BLD AUTO: 5.25 K/UL
NEUTROPHILS NFR BLD AUTO: 65.8 %
PLATELET # BLD AUTO: 291 K/UL
POTASSIUM SERPL-SCNC: 3.8 MMOL/L
PROT SERPL-MCNC: 6.9 G/DL
PT BLD: 11.3 SEC
RBC # BLD: 4.01 M/UL
RBC # FLD: 13.2 %
SODIUM SERPL-SCNC: 139 MMOL/L
TIBC SERPL-MCNC: 293 UG/DL
TRIGL SERPL-MCNC: 76 MG/DL
TSH SERPL-ACNC: 1.52 UIU/ML
UIBC SERPL-MCNC: 167 UG/DL
VIT A SERPL-MCNC: 53.9 UG/DL
VIT B1 SERPL-MCNC: 130.3 NMOL/L
VIT B12 SERPL-MCNC: 501 PG/ML
VIT B6 SERPL-MCNC: 12.7 UG/L
WBC # FLD AUTO: 7.96 K/UL

## 2021-02-16 ENCOUNTER — APPOINTMENT (OUTPATIENT)
Dept: BARIATRICS/WEIGHT MGMT | Facility: CLINIC | Age: 46
End: 2021-02-16

## 2021-02-26 ENCOUNTER — APPOINTMENT (OUTPATIENT)
Dept: ORTHOPEDIC SURGERY | Facility: CLINIC | Age: 46
End: 2021-02-26

## 2021-05-14 ENCOUNTER — APPOINTMENT (OUTPATIENT)
Dept: INTERNAL MEDICINE | Facility: CLINIC | Age: 46
End: 2021-05-14

## 2021-05-23 NOTE — REVIEW OF SYSTEMS
Hospitalist progress note:  Date of service 05/23/2021     Chief complaint:  Pneumonia     65-year-old female admitted on 05/10/2021 for sepsis secondary to pneumonia.  Chest x-rays and CT revealed intense consolidation and infiltrates of the right upper lobe and left lower lobe with nodularity, suspicious for possible malignancy.  She has been treated with intravenous cefepime, Azithromycin and metronidazole but her condition continued to worsen.  She states she is weaker now, still coughing up large amounts of purulence sputum without hemoptysis, intermittent feverish feelings and chills.     Follow-up CT revealed worsening consolidation in the left lower lobe and infection of the right upper lobe     Antibiotics switched to Zosyn 5/18.  White blood cell count down to 23,000 today  Yeast in sputum culture, but felt to be a contaminant by ID service  Bronchoscopy performed 5/19 with transbronchial biopsies and microbiology samples obtained     Temp 97.8°, pulse 61, respiratory rate 20, now on 4L of supplemental oxygen  Heart has a normal S1-S2 without murmurs  Lungs have consolidative changes in the left base and the right mid lung field.  Extremities have 1-2 cm peripheral edema.  Multiple ecchymosis in the arms     Impression and plan:  1. Pneumonia, still requiring large amount of supplemental oxygen by nasal cannula, but she is looking better and white blood cell count is decreasing slightly..   Evaluated by ID and Pulmonary, bronchoscopy 5/19.   Bronchoscopy samples negative for AFB, fungus, bacteria or malignancy so far.  Will discontinue IV Zosyn and vancomycin.  Rx Augmentin  2. Persistent leukocytosis, improving.   3. Paroxysmal atrial fib/flutter, on heparin for anticoagulation.  Lanoxin dose decreased to 125 mcg due to multiple antibiotics, continue Toprol, follow-up with Dr. Johnson on 07/05/2021 as previously scheduled  4. Acute kidney injury, improved after losartan was held  5. Hypo magnesemia, replace  intravenously  6. Sepsis secondary to pneumonia  7. Peripheral edema.   improved after a dose of Lasix yesterday and     Recommend consult social service for short-term subacute rehab placement   [Negative] : Heme/Lymph

## 2021-07-26 ENCOUNTER — APPOINTMENT (OUTPATIENT)
Dept: INTERNAL MEDICINE | Facility: CLINIC | Age: 46
End: 2021-07-26
Payer: COMMERCIAL

## 2021-07-26 VITALS
HEART RATE: 92 BPM | TEMPERATURE: 97.2 F | BODY MASS INDEX: 32.74 KG/M2 | SYSTOLIC BLOOD PRESSURE: 114 MMHG | HEIGHT: 68 IN | DIASTOLIC BLOOD PRESSURE: 76 MMHG | WEIGHT: 216 LBS

## 2021-07-26 DIAGNOSIS — E03.9 HYPOTHYROIDISM, UNSPECIFIED: ICD-10-CM

## 2021-07-26 DIAGNOSIS — D17.24 BENIGN LIPOMATOUS NEOPLASM OF SKIN AND SUBCUTANEOUS TISSUE OF LEFT LEG: ICD-10-CM

## 2021-07-26 DIAGNOSIS — M67.432 GANGLION, LEFT WRIST: ICD-10-CM

## 2021-07-26 DIAGNOSIS — E55.9 VITAMIN D DEFICIENCY, UNSPECIFIED: ICD-10-CM

## 2021-07-26 DIAGNOSIS — D17.30 BENIGN LIPOMATOUS NEOPLASM OF SKIN AND SUBCUTANEOUS TISSUE OF UNSPECIFIED SITES: ICD-10-CM

## 2021-07-26 PROCEDURE — 99396 PREV VISIT EST AGE 40-64: CPT | Mod: 25

## 2021-07-26 PROCEDURE — 36415 COLL VENOUS BLD VENIPUNCTURE: CPT

## 2021-07-26 PROCEDURE — 99072 ADDL SUPL MATRL&STAF TM PHE: CPT

## 2021-07-26 RX ORDER — OMEPRAZOLE 20 MG/1
20 CAPSULE, DELAYED RELEASE ORAL DAILY
Qty: 30 | Refills: 1 | Status: DISCONTINUED | COMMUNITY
Start: 2020-11-12 | End: 2021-07-26

## 2021-07-26 NOTE — PLAN
[FreeTextEntry1] : In regards to patients Physical exam, routine blood work drawn, will review results with patient.\par \par \par patient's BP well controlled with current medication. will continue current  regimen \par \par Counseling included abnormal lab results, differential diagnoses, treatment options, risks and benefits, lifestyle changes, current condition, medications, and dose adjustments. \par The patient was interactive, attentive, asked questions, and verbalized understanding

## 2021-07-26 NOTE — HEALTH RISK ASSESSMENT
[Good] : ~his/her~  mood as  good [No] : In the past 12 months have you used drugs other than those required for medical reasons? No [No falls in past year] : Patient reported no falls in the past year [0] : 2) Feeling down, depressed, or hopeless: Not at all (0) [PHQ-2 Negative - No further assessment needed] : PHQ-2 Negative - No further assessment needed [Patient reported mammogram was normal] : Patient reported mammogram was normal [Patient reported PAP Smear was normal] : Patient reported PAP Smear was normal [Patient reported colonoscopy was normal] : Patient reported colonoscopy was normal [] : No [[id number]] : 0 [MammogramDate] : 01/21 [PapSmearDate] : 01/21 [ColonoscopyDate] : 07/18

## 2021-07-26 NOTE — HISTORY OF PRESENT ILLNESS
[FreeTextEntry1] : Physical exam [de-identified] : Ms. GLENDY WALKER is a 46 year female with a PMH of HTN comes to the office for follow up of medical conditions and physical exam. Patient denies fever, cough SOB. No other complaints at this time.

## 2021-07-27 ENCOUNTER — APPOINTMENT (OUTPATIENT)
Dept: INTERNAL MEDICINE | Facility: CLINIC | Age: 46
End: 2021-07-27
Payer: COMMERCIAL

## 2021-07-27 VITALS
DIASTOLIC BLOOD PRESSURE: 85 MMHG | WEIGHT: 216 LBS | TEMPERATURE: 97 F | BODY MASS INDEX: 32.74 KG/M2 | HEART RATE: 80 BPM | HEIGHT: 68 IN | SYSTOLIC BLOOD PRESSURE: 121 MMHG

## 2021-07-27 DIAGNOSIS — Z78.9 OTHER SPECIFIED HEALTH STATUS: ICD-10-CM

## 2021-07-27 DIAGNOSIS — Z11.1 ENCOUNTER FOR SCREENING FOR RESPIRATORY TUBERCULOSIS: ICD-10-CM

## 2021-07-27 LAB
25(OH)D3 SERPL-MCNC: 49.6 NG/ML
ALBUMIN SERPL ELPH-MCNC: 4.5 G/DL
ALP BLD-CCNC: 83 U/L
ALT SERPL-CCNC: 20 U/L
ANION GAP SERPL CALC-SCNC: 13 MMOL/L
AST SERPL-CCNC: 17 U/L
BASOPHILS # BLD AUTO: 0.05 K/UL
BASOPHILS NFR BLD AUTO: 0.7 %
BILIRUB SERPL-MCNC: 0.5 MG/DL
BUN SERPL-MCNC: 7 MG/DL
CALCIUM SERPL-MCNC: 9.8 MG/DL
CHLORIDE SERPL-SCNC: 105 MMOL/L
CHOLEST SERPL-MCNC: 171 MG/DL
CO2 SERPL-SCNC: 22 MMOL/L
CREAT SERPL-MCNC: 0.68 MG/DL
EOSINOPHIL # BLD AUTO: 0.15 K/UL
EOSINOPHIL NFR BLD AUTO: 2.1 %
ESTIMATED AVERAGE GLUCOSE: 77 MG/DL
GLUCOSE SERPL-MCNC: 114 MG/DL
HBA1C MFR BLD HPLC: 4.3 %
HCT VFR BLD CALC: 39.1 %
HDLC SERPL-MCNC: 52 MG/DL
HGB BLD-MCNC: 12.8 G/DL
IMM GRANULOCYTES NFR BLD AUTO: 0.1 %
LDLC SERPL CALC-MCNC: 89 MG/DL
LYMPHOCYTES # BLD AUTO: 1.77 K/UL
LYMPHOCYTES NFR BLD AUTO: 24.8 %
MAN DIFF?: NORMAL
MCHC RBC-ENTMCNC: 32.4 PG
MCHC RBC-ENTMCNC: 32.7 GM/DL
MCV RBC AUTO: 99 FL
MEV IGG FLD QL IA: 24 AU/ML
MEV IGG+IGM SER-IMP: POSITIVE
MONOCYTES # BLD AUTO: 0.51 K/UL
MONOCYTES NFR BLD AUTO: 7.1 %
MUV AB SER-ACNC: NEGATIVE
MUV IGG SER QL IA: 7.9 AU/ML
NEUTROPHILS # BLD AUTO: 4.65 K/UL
NEUTROPHILS NFR BLD AUTO: 65.2 %
NONHDLC SERPL-MCNC: 120 MG/DL
PLATELET # BLD AUTO: 309 K/UL
POTASSIUM SERPL-SCNC: 4 MMOL/L
PROT SERPL-MCNC: 7.2 G/DL
RBC # BLD: 3.95 M/UL
RBC # FLD: 12.9 %
RUBV IGG FLD-ACNC: 16.6 INDEX
RUBV IGG SER-IMP: POSITIVE
SODIUM SERPL-SCNC: 140 MMOL/L
TRIGL SERPL-MCNC: 153 MG/DL
TSH SERPL-ACNC: 0.7 UIU/ML
VZV AB TITR SER: POSITIVE
VZV IGG SER IF-ACNC: 1728 INDEX
WBC # FLD AUTO: 7.14 K/UL

## 2021-07-27 PROCEDURE — 90707 MMR VACCINE SC: CPT

## 2021-07-27 PROCEDURE — 99072 ADDL SUPL MATRL&STAF TM PHE: CPT

## 2021-07-27 PROCEDURE — 90471 IMMUNIZATION ADMIN: CPT

## 2021-07-27 PROCEDURE — 99213 OFFICE O/P EST LOW 20 MIN: CPT | Mod: 25

## 2021-07-27 NOTE — HEALTH RISK ASSESSMENT
[0] : 2) Feeling down, depressed, or hopeless: Not at all (0) [PHQ-2 Negative - No further assessment needed] : PHQ-2 Negative - No further assessment needed [[id number]] : 0

## 2021-07-27 NOTE — HISTORY OF PRESENT ILLNESS
[FreeTextEntry1] : follow up chronic medical conditions.  [de-identified] : Ms. GLENDY WALKER is a 46 year female with a PMH of HTN comes to the office for follow up of medical conditions and MMR booster vaccine. Patient denies fever, cough SOB. No other complaints at this time.

## 2021-07-27 NOTE — PLAN
[FreeTextEntry1] : \par \par patient's BP well controlled with current medication. will continue current  regimen \par \par Patient received MMR vaccine today. Patient advised of adverse effects of medication including but not limited to muscle soreness at site of injection and general malaise \par \par Will complete form for patient when Quantiferon results return. \par \par Counseling included abnormal lab results, differential diagnoses, treatment options, risks and benefits, lifestyle changes, current condition, medications, and dose adjustments. \par The patient was interactive, attentive, asked questions, and verbalized understanding

## 2021-08-02 ENCOUNTER — RX CHANGE (OUTPATIENT)
Age: 46
End: 2021-08-02

## 2021-08-02 LAB
M TB IFN-G BLD-IMP: NEGATIVE
QUANTIFERON TB PLUS MITOGEN MINUS NIL: 9.14 IU/ML
QUANTIFERON TB PLUS NIL: 0.02 IU/ML
QUANTIFERON TB PLUS TB1 MINUS NIL: -0.01 IU/ML
QUANTIFERON TB PLUS TB2 MINUS NIL: 0 IU/ML

## 2021-08-02 RX ORDER — METOPROLOL SUCCINATE 25 MG/1
25 TABLET, EXTENDED RELEASE ORAL
Qty: 90 | Refills: 3 | Status: DISCONTINUED | COMMUNITY
Start: 2019-08-07 | End: 2021-08-02

## 2021-08-02 NOTE — PHYSICAL THERAPY INITIAL EVALUATION ADULT - PERSONAL SAFETY AND JUDGMENT, REHAB EVAL
Zachariah VESTA Nancy presents to [facility].    Chief Complaint:  Leg Pain ((L))         History of Present Illness:  Leg pain  Ultrasound of left leg, no DVT 7-6-21, d dimer was negative; moderate deg changes on left hip and knee, femur x-ray 7-1-21 and swelling seen in knee, dr lindsay recommended muscle relaxer which he took once and did not like how it made him feel;   Saw Dub[facility], got an injection, still having pain and swelling, biofreeze has helped, can't get compression hose on.,  Having low back pain, bilateral sides and radiation to left groin   Heat helps more than ice   (did have a fall in 9-2019, hit knee)     Hypertension:  Current medication metoprolol, lisinopril and norvasc   Tolerating Medication: Yes  Checking BP at home and it is : 140-180 and lower reading 70-80  Needs refills: No  Labs   Lab Results       Component                Value               Date                       BUN                      20                  04/23/2021                 CREATININE               1.10                04/23/2021                 BCR                      18                  04/23/2021                 K                        4.8                 04/23/2021                 CO2                      25                  04/23/2021                 CALCIUM                  9.6                 04/23/2021                 ALBUMIN                  4.1                 04/23/2021                 LABIL2                   1.6                 04/23/2021                 AST                      15                  04/23/2021                 ALT                      19                  04/23/2021                    Review of Systems:  Review of Systems   Constitutional: Negative for fatigue and fever.   Respiratory: Negative for cough and shortness of breath.    Cardiovascular: Positive for leg swelling (left lower leg, improved ). Negative for chest pain and palpitations.   Gastrointestinal:         Occ difficulty going to bathroom    Genitourinary:        No change in bladder habits    Neurological: Negative for numbness.   Psychiatric/Behavioral: Negative for sleep disturbance.          Vital Signs:   /81 (BP Location: Left arm, Patient Position: Sitting, Cuff Size: Adult)   Pulse 64   Temp 98.3 °F (36.8 °C) (Oral)   Wt 101 kg (222 lb)   BMI 31.85 kg/m²       Physical Exam:  Physical Exam  Vitals reviewed.   Constitutional:       General: He is not in acute distress.     Appearance: Normal appearance.   Neck:      Vascular: No carotid bruit.   Cardiovascular:      Rate and Rhythm: Normal rate and regular rhythm.      Heart sounds: Normal heart sounds. No murmur heard.     Pulmonary:      Effort: Pulmonary effort is normal. No respiratory distress.      Breath sounds: Normal breath sounds.   Musculoskeletal:      Right lower leg: No edema.      Left lower leg: No edema (trace to 1+).      Comments: No CVA tenderness or lumbar para spinous tenderness or pain with ROM of left knee, no redness or swelling note of knee, pain with flexion of back    Neurological:      Mental Status: He is alert.   Psychiatric:         Mood and Affect: Mood normal.         Behavior: Behavior normal.         Result Review      The following data was reviewed by: JACIEL Tovar on 08/02/2021:    Results for orders placed or performed in visit on 07/28/21   Protime-INR    Specimen: Blood   Result Value Ref Range    Protime 26.7 (H) 9.4 - 12.0 Seconds    INR 2.64 2.00 - 3.00               Assessment and Plan:          Diagnoses and all orders for this visit:    1. Essential hypertension (Primary)  Assessment & Plan:  BP up,  Continue to monitor BP at home. Continue current meds. Continue to modify diet and lifestyle. If BP staying up after pain has improved, let me know     Orders:  -     Basic Metabolic Panel; Future  -     Urinalysis With Culture If Indicated -; Future    2. Left leg pain  Assessment &  Plan:  Sent for Ty note, considering PT, reviewed x-ray's, ultrasound and labs done by Dr Gonzalez     Orders:  -     Uric Acid; Future    3. Acute bilateral low back pain with left-sided sciatica  -     XR Spine Lumbar 2 or 3 View; Future        Follow Up   Return for followup pending lab results, follow up pending x-ray result.  Patient was given instructions and counseling regarding his condition or for health maintenance advice. Please see specific information pulled into the AVS if appropriate.        intact

## 2021-09-27 ENCOUNTER — APPOINTMENT (OUTPATIENT)
Dept: INTERNAL MEDICINE | Facility: CLINIC | Age: 46
End: 2021-09-27
Payer: COMMERCIAL

## 2021-09-27 VITALS
HEIGHT: 68 IN | BODY MASS INDEX: 32.74 KG/M2 | DIASTOLIC BLOOD PRESSURE: 77 MMHG | RESPIRATION RATE: 16 BRPM | HEART RATE: 89 BPM | TEMPERATURE: 97.1 F | SYSTOLIC BLOOD PRESSURE: 107 MMHG | WEIGHT: 216 LBS

## 2021-09-27 PROCEDURE — 99214 OFFICE O/P EST MOD 30 MIN: CPT

## 2021-09-27 NOTE — HEALTH RISK ASSESSMENT
[0] : 2) Feeling down, depressed, or hopeless: Not at all (0) [PHQ-2 Negative - No further assessment needed] : PHQ-2 Negative - No further assessment needed [[id number]] : 0

## 2021-09-27 NOTE — HISTORY OF PRESENT ILLNESS
[Spouse] : spouse [FreeTextEntry1] : wound infection [de-identified] : Ms. GLENDY WALKER is a 46 year female with a PMH of HTN comes to the office for follow up of medical conditions and c/o infection of wound. Patient is POD 17 from abdominoplasty and liposuction on 09/10/21 in [facility] [place] \par \par Patient states left portion of lower abdominal surgical opening has had large amount of discharge yellow-green in color for over 1 week. Patient denies fever, cough SOB. No other complaints at this time. \par \par

## 2021-09-27 NOTE — PLAN
[FreeTextEntry1] : Wound dehiscence with wound infection, copious amounts of purulent discharge drained from the wound. Culture taken, wound flushed with normal saline and was dried. Wound was packed with sterile packing strip. Dry gauze was applied on top of wound.\par \par Patient started on antibiotics, will follow up in office in 1 week\par \par Patient was referred to Wound care for daily dressing changes from wound care team. \par \par patient's BP well controlled with current medication. will continue current  regimen \par \par Prior to appointment and during encounter with patient extensive medical records were reviewed including but not limited to, Hospital records records, out patient records, laboratory data and microbiology data \par In addition extensive time was also spent in reviewing diagnostic studies.\par \par Total encounter total time 30 mins\par >50% of time spent counseling/coordinating care\par \par \par Counseling included abnormal lab results, differential diagnoses, treatment options, risks and benefits, lifestyle changes, current condition, medications, and dose adjustments. \par The patient was interactive, attentive, asked questions, and verbalized understanding

## 2021-09-27 NOTE — PHYSICAL EXAM
[No Acute Distress] : no acute distress [Well Nourished] : well nourished [Well Developed] : well developed [Well-Appearing] : well-appearing [Normal Sclera/Conjunctiva] : normal sclera/conjunctiva [PERRL] : pupils equal round and reactive to light [EOMI] : extraocular movements intact [Normal Outer Ear/Nose] : the outer ears and nose were normal in appearance [Normal Oropharynx] : the oropharynx was normal [No JVD] : no jugular venous distention [No Lymphadenopathy] : no lymphadenopathy [Supple] : supple [Thyroid Normal, No Nodules] : the thyroid was normal and there were no nodules present [No Respiratory Distress] : no respiratory distress  [No Accessory Muscle Use] : no accessory muscle use [Clear to Auscultation] : lungs were clear to auscultation bilaterally [Normal Rate] : normal rate  [Regular Rhythm] : with a regular rhythm [Normal S1, S2] : normal S1 and S2 [No Murmur] : no murmur heard [No Carotid Bruits] : no carotid bruits [No Abdominal Bruit] : a ~M bruit was not heard ~T in the abdomen [No Varicosities] : no varicosities [Pedal Pulses Present] : the pedal pulses are present [No Edema] : there was no peripheral edema [No Palpable Aorta] : no palpable aorta [No Extremity Clubbing/Cyanosis] : no extremity clubbing/cyanosis [Soft] : abdomen soft [Non Tender] : non-tender [Non-distended] : non-distended [No Masses] : no abdominal mass palpated [No HSM] : no HSM [Normal Bowel Sounds] : normal bowel sounds [Normal Posterior Cervical Nodes] : no posterior cervical lymphadenopathy [Normal Anterior Cervical Nodes] : no anterior cervical lymphadenopathy [No CVA Tenderness] : no CVA  tenderness [No Spinal Tenderness] : no spinal tenderness [No Joint Swelling] : no joint swelling [Grossly Normal Strength/Tone] : grossly normal strength/tone [Coordination Grossly Intact] : coordination grossly intact [No Focal Deficits] : no focal deficits [Normal Gait] : normal gait [Deep Tendon Reflexes (DTR)] : deep tendon reflexes were 2+ and symmetric [Normal Affect] : the affect was normal [Normal Insight/Judgement] : insight and judgment were intact [de-identified] : 10 cm surgical scar across lower abdomen with evidence of wound dehiscences on left side. Opening is 2 cm x 1 cm diameter and approximately 3-4 cm deep with lateral tracking.

## 2021-10-04 ENCOUNTER — APPOINTMENT (OUTPATIENT)
Dept: INTERNAL MEDICINE | Facility: CLINIC | Age: 46
End: 2021-10-04
Payer: COMMERCIAL

## 2021-10-04 VITALS
HEIGHT: 68 IN | BODY MASS INDEX: 32.74 KG/M2 | TEMPERATURE: 97.1 F | WEIGHT: 216 LBS | SYSTOLIC BLOOD PRESSURE: 122 MMHG | DIASTOLIC BLOOD PRESSURE: 87 MMHG | HEART RATE: 84 BPM

## 2021-10-04 LAB — BACTERIA WND CULT: ABNORMAL

## 2021-10-04 PROCEDURE — 99214 OFFICE O/P EST MOD 30 MIN: CPT

## 2021-10-04 NOTE — HEALTH RISK ASSESSMENT
[0] : 2) Feeling down, depressed, or hopeless: Not at all (0) [PHQ-2 Negative - No further assessment needed] : PHQ-2 Negative - No further assessment needed [[id number]] : 0

## 2021-10-04 NOTE — HISTORY OF PRESENT ILLNESS
[FreeTextEntry1] : follow up  [de-identified] : Ms. GLENDY WALKER is a 46 year female with a PMH of HTN comes to the office for follow up of medical conditions and follow up of  infection of wound. Patient is POD 24 from abdominoplasty and liposuction on 09/10/21 in [facility] [place] \par \par

## 2021-10-04 NOTE — PLAN
[FreeTextEntry1] : Wound dehiscence with wound infection, copious amounts of purulent discharge drained from the wound.  wound flushed with normal saline and was dried. Wound was packed with sterile packing strip. Dry gauze was applied on top of wound.\par \par \par continued antibiotics, will follow up in office in 1 week\par \par Patient will continue home Wound care for daily dressing changes from wound care team. \par Patient was referred to General surgeon for further evaluation of surgical site. \par \par patient's BP well controlled with current medication. will continue current  regimen \par \par Prior to appointment and during encounter with patient extensive medical records were reviewed including but not limited to, Hospital records records, out patient records, laboratory data and microbiology data \par In addition extensive time was also spent in reviewing diagnostic studies.\par \par Total encounter total time 30 mins\par >50% of time spent counseling/coordinating care\par \par \par Counseling included abnormal lab results, differential diagnoses, treatment options, risks and benefits, lifestyle changes, current condition, medications, and dose adjustments. \par The patient was interactive, attentive, asked questions, and verbalized understanding

## 2021-10-04 NOTE — PHYSICAL EXAM
[No Acute Distress] : no acute distress [Well Nourished] : well nourished [Well Developed] : well developed [Well-Appearing] : well-appearing [Normal Sclera/Conjunctiva] : normal sclera/conjunctiva [PERRL] : pupils equal round and reactive to light [EOMI] : extraocular movements intact [Normal Outer Ear/Nose] : the outer ears and nose were normal in appearance [Normal Oropharynx] : the oropharynx was normal [No JVD] : no jugular venous distention [No Lymphadenopathy] : no lymphadenopathy [Supple] : supple [Thyroid Normal, No Nodules] : the thyroid was normal and there were no nodules present [No Respiratory Distress] : no respiratory distress  [No Accessory Muscle Use] : no accessory muscle use [Clear to Auscultation] : lungs were clear to auscultation bilaterally [Normal Rate] : normal rate  [Regular Rhythm] : with a regular rhythm [Normal S1, S2] : normal S1 and S2 [No Murmur] : no murmur heard [No Carotid Bruits] : no carotid bruits [No Abdominal Bruit] : a ~M bruit was not heard ~T in the abdomen [No Varicosities] : no varicosities [Pedal Pulses Present] : the pedal pulses are present [No Edema] : there was no peripheral edema [No Palpable Aorta] : no palpable aorta [No Extremity Clubbing/Cyanosis] : no extremity clubbing/cyanosis [Soft] : abdomen soft [Non Tender] : non-tender [Non-distended] : non-distended [No Masses] : no abdominal mass palpated [No HSM] : no HSM [Normal Bowel Sounds] : normal bowel sounds [Normal Posterior Cervical Nodes] : no posterior cervical lymphadenopathy [Normal Anterior Cervical Nodes] : no anterior cervical lymphadenopathy [No CVA Tenderness] : no CVA  tenderness [No Spinal Tenderness] : no spinal tenderness [No Joint Swelling] : no joint swelling [Grossly Normal Strength/Tone] : grossly normal strength/tone [Coordination Grossly Intact] : coordination grossly intact [No Focal Deficits] : no focal deficits [Normal Gait] : normal gait [Deep Tendon Reflexes (DTR)] : deep tendon reflexes were 2+ and symmetric [Normal Affect] : the affect was normal [Normal Insight/Judgement] : insight and judgment were intact [de-identified] : 10 cm surgical scar across lower abdomen with evidence of wound dehiscences on left side which is 2 cm x 1 cm diameter and approximately 3-4 cm deep with lateral tracking. this is also central wound dehiscence opening is 2 cm diameter with tracking to the left.

## 2021-10-11 ENCOUNTER — APPOINTMENT (OUTPATIENT)
Dept: SURGERY | Facility: CLINIC | Age: 46
End: 2021-10-11
Payer: COMMERCIAL

## 2021-10-11 VITALS
HEART RATE: 70 BPM | OXYGEN SATURATION: 70 % | RESPIRATION RATE: 16 BRPM | SYSTOLIC BLOOD PRESSURE: 138 MMHG | WEIGHT: 208.03 LBS | DIASTOLIC BLOOD PRESSURE: 84 MMHG | TEMPERATURE: 97.9 F | HEIGHT: 72 IN | BODY MASS INDEX: 28.18 KG/M2

## 2021-10-11 PROCEDURE — 99204 OFFICE O/P NEW MOD 45 MIN: CPT

## 2021-10-11 NOTE — PHYSICAL EXAM
[JVD] : no jugular venous distention  [Normal Thyroid] : the thyroid was normal [Normal Breath Sounds] : Normal breath sounds [Normal Heart Sounds] : normal heart sounds [Normal Rate and Rhythm] : normal rate and rhythm [No HSM] : no hepatosplenomegaly [No Rash or Lesion] : No rash or lesion [Alert] : alert [Oriented to Person] : oriented to person [Oriented to Place] : oriented to place [Oriented to Time] : oriented to time [Calm] : calm [de-identified] : Soft, nondistended, nontender, 3 separate open wounds packed with gauze strips, no surrounding erythema, draining mildly purulent material [de-identified] : Well-appearing, no acute distress [de-identified] : No CVA tenderness [de-identified] : Full range of motion

## 2021-10-11 NOTE — REVIEW OF SYSTEMS
[Fever] : no fever [Chills] : no chills [Feeling Poorly] : not feeling poorly [Feeling Tired] : not feeling tired [Eye Pain] : no eye pain [Red Eyes] : eyes not red [Eyesight Problems] : no eyesight problems [Discharge From Eyes] : no purulent discharge from the eyes [Earache] : no earache [Loss Of Hearing] : no hearing loss [Nosebleeds] : no nosebleeds [Nasal Discharge] : no nasal discharge [Heart Rate Is Slow] : the heart rate was not slow [Heart Rate Is Fast] : the heart rate was not fast [Chest Pain] : no chest pain [Palpitations] : no palpitations [Shortness Of Breath] : no shortness of breath [Wheezing] : no wheezing [Cough] : no cough [SOB on Exertion] : no shortness of breath during exertion [Abdominal Pain] : no abdominal pain [Vomiting] : no vomiting [Constipation] : no constipation [Diarrhea] : no diarrhea [Dysuria] : no dysuria [Incontinence] : no incontinence [Pelvic Pain] : no pelvic pain [Dysmenorrhea] : no dysmenorrhea [Joint Swelling] : no joint swelling [Joint Stiffness] : no joint stiffness [Limb Pain] : no limb pain [Limb Swelling] : no limb swelling [Skin Lesions] : no skin lesions [Skin Wound] : no skin wound [Change In A Mole] : no change in a mole [Itching] : no itching [Confused] : no confusion [Convulsions] : no convulsions [Dizziness] : no dizziness [Fainting] : no fainting [Suicidal] : not suicidal [Sleep Disturbances] : no sleep disturbances [Anxiety] : no anxiety [Depression] : no depression [Proptosis] : no proptosis [Hot Flashes] : no hot flashes [Muscle Weakness] : no muscle weakness [Deepening Of The Voice] : no deepening of the voice [Easy Bleeding] : no tendency for easy bleeding [Easy Bruising] : no tendency for easy bruising [Swollen Glands] : no swollen glands [Swollen Glands In The Neck] : no swollen glands in the neck

## 2021-10-11 NOTE — HISTORY OF PRESENT ILLNESS
[de-identified] : This patient is a pleasant 46-year-old female who has a history of bariatric surgery by Dr. Kelly at [facility], who recently [place].  That surgery was complicated by wound dehiscence with purulent drainage, for which she has been undergoing packing changes by visiting nurse services.  She presents today for ongoing wound care.  She denies fever/chills.  The area around the sites has remained clean.  She states that the material draining from the sites was once very purulent but has now started to become clearer.

## 2021-10-11 NOTE — ASSESSMENT
[FreeTextEntry1] : In brief, this patient is a pleasant 46-year-old female who recently underwent an abdominoplasty and liposuction complicated by wound infection and dehiscence, currently undergoing local wound care with gauze packing.  I would recommend the patient continue with her local wound care and we will set up her VNS services.  Return to the office in 2 to 3 weeks to reassess wound.

## 2021-10-13 ENCOUNTER — APPOINTMENT (OUTPATIENT)
Dept: INTERNAL MEDICINE | Facility: CLINIC | Age: 46
End: 2021-10-13
Payer: COMMERCIAL

## 2021-10-13 VITALS
DIASTOLIC BLOOD PRESSURE: 74 MMHG | HEART RATE: 76 BPM | WEIGHT: 208 LBS | TEMPERATURE: 97.1 F | SYSTOLIC BLOOD PRESSURE: 101 MMHG | BODY MASS INDEX: 31.52 KG/M2 | HEIGHT: 68 IN

## 2021-10-13 PROCEDURE — 99213 OFFICE O/P EST LOW 20 MIN: CPT

## 2021-10-13 RX ORDER — AMOXICILLIN AND CLAVULANATE POTASSIUM 875; 125 MG/1; MG/1
875-125 TABLET, COATED ORAL TWICE DAILY
Qty: 14 | Refills: 0 | Status: COMPLETED | COMMUNITY
Start: 2021-09-27 | End: 2021-10-13

## 2021-10-13 RX ORDER — AMOXICILLIN AND CLAVULANATE POTASSIUM 875; 125 MG/1; MG/1
875-125 TABLET, COATED ORAL TWICE DAILY
Qty: 14 | Refills: 0 | Status: COMPLETED | COMMUNITY
Start: 2021-10-04 | End: 2021-10-13

## 2021-10-13 NOTE — PHYSICAL EXAM
[No Acute Distress] : no acute distress [Well Nourished] : well nourished [Well Developed] : well developed [Well-Appearing] : well-appearing [Normal Sclera/Conjunctiva] : normal sclera/conjunctiva [PERRL] : pupils equal round and reactive to light [EOMI] : extraocular movements intact [Normal Outer Ear/Nose] : the outer ears and nose were normal in appearance [Normal Oropharynx] : the oropharynx was normal [No JVD] : no jugular venous distention [No Lymphadenopathy] : no lymphadenopathy [Supple] : supple [Thyroid Normal, No Nodules] : the thyroid was normal and there were no nodules present [No Respiratory Distress] : no respiratory distress  [No Accessory Muscle Use] : no accessory muscle use [Clear to Auscultation] : lungs were clear to auscultation bilaterally [Normal Rate] : normal rate  [Regular Rhythm] : with a regular rhythm [Normal S1, S2] : normal S1 and S2 [No Murmur] : no murmur heard [No Carotid Bruits] : no carotid bruits [No Abdominal Bruit] : a ~M bruit was not heard ~T in the abdomen [No Varicosities] : no varicosities [Pedal Pulses Present] : the pedal pulses are present [No Edema] : there was no peripheral edema [No Palpable Aorta] : no palpable aorta [No Extremity Clubbing/Cyanosis] : no extremity clubbing/cyanosis [Soft] : abdomen soft [Non Tender] : non-tender [Non-distended] : non-distended [No Masses] : no abdominal mass palpated [No HSM] : no HSM [Normal Bowel Sounds] : normal bowel sounds [Normal Posterior Cervical Nodes] : no posterior cervical lymphadenopathy [Normal Anterior Cervical Nodes] : no anterior cervical lymphadenopathy [No CVA Tenderness] : no CVA  tenderness [No Spinal Tenderness] : no spinal tenderness [No Joint Swelling] : no joint swelling [Grossly Normal Strength/Tone] : grossly normal strength/tone [Coordination Grossly Intact] : coordination grossly intact [No Focal Deficits] : no focal deficits [Normal Gait] : normal gait [Deep Tendon Reflexes (DTR)] : deep tendon reflexes were 2+ and symmetric [Normal Affect] : the affect was normal [Normal Insight/Judgement] : insight and judgment were intact [de-identified] : 10 cm surgical scar across lower abdomen with evidence of wound dehiscences on left side which is 2 cm x 1 cm diameter and approximately 2 cm deep with lateral tracking. there is also central wound dehiscence opening is 1 cm diameter with tracking to the left.  minimal serosanguineous discahrge from wound openings

## 2021-10-13 NOTE — PLAN
[FreeTextEntry1] : Wound dehiscence with wound infection, Improving. minimal amounts of serious discharge drained from the wound. patient will continue Visiting home nursing services.\par \par \par Patient will continue home Wound care for daily dressing changes from wound care team. \par Patient was referred to General surgeon for further evaluation of surgical site. \par \par patient's BP well controlled with current medication. will continue current  regimen \par \par Prior to appointment and during encounter with patient extensive medical records were reviewed including but not limited to, Hospital records records, out patient records, laboratory data and microbiology data \par In addition extensive time was also spent in reviewing diagnostic studies.\par \par Total encounter total time 20 mins\par >50% of time spent counseling/coordinating care\par \par \par Counseling included abnormal lab results, differential diagnoses, treatment options, risks and benefits, lifestyle changes, current condition, medications, and dose adjustments. \par The patient was interactive, attentive, asked questions, and verbalized understanding

## 2021-10-13 NOTE — HEALTH RISK ASSESSMENT
[0] : 2) Feeling down, depressed, or hopeless: Not at all (0) [PHQ-2 Negative - No further assessment needed] : PHQ-2 Negative - No further assessment needed [[id number]] : 0

## 2021-10-15 ENCOUNTER — NON-APPOINTMENT (OUTPATIENT)
Age: 46
End: 2021-10-15

## 2021-10-15 DIAGNOSIS — N89.8 OTHER SPECIFIED NONINFLAMMATORY DISORDERS OF VAGINA: ICD-10-CM

## 2021-11-01 ENCOUNTER — APPOINTMENT (OUTPATIENT)
Dept: INTERNAL MEDICINE | Facility: CLINIC | Age: 46
End: 2021-11-01
Payer: COMMERCIAL

## 2021-11-01 ENCOUNTER — APPOINTMENT (OUTPATIENT)
Dept: SURGERY | Facility: CLINIC | Age: 46
End: 2021-11-01

## 2021-11-01 VITALS
WEIGHT: 208 LBS | SYSTOLIC BLOOD PRESSURE: 121 MMHG | HEIGHT: 68 IN | DIASTOLIC BLOOD PRESSURE: 81 MMHG | BODY MASS INDEX: 31.52 KG/M2 | HEART RATE: 88 BPM

## 2021-11-01 PROCEDURE — 99213 OFFICE O/P EST LOW 20 MIN: CPT

## 2021-11-01 NOTE — HISTORY OF PRESENT ILLNESS
[FreeTextEntry1] : follow up  [de-identified] : Ms. GLENDY WALKER is a 46 year female with a PMH of HTN comes to the office for follow up of medical conditions and follow up of  infection of wound. patient had abdominoplasty and liposuction on 09/10/21 in [facility] [place] \par \par

## 2021-11-01 NOTE — PLAN
[FreeTextEntry1] : Wound dehiscence with wound infection, Improving. minimal amounts of serious discharge drained from the wound. Wounds cleaned with normal saline and packed with plain packing strips.  patient will continue Visiting home nursing services.\par Patient given 1 week antibiotics\par \par Patient will continue home Wound care for daily dressing changes from wound care team. \par \par will follow up in office in 2 weeks. \par \par patient's BP well controlled with current medication. will continue current  regimen \par \par Prior to appointment and during encounter with patient extensive medical records were reviewed including but not limited to, Hospital records records, out patient records, laboratory data and microbiology data \par In addition extensive time was also spent in reviewing diagnostic studies.\par \par Total encounter total time 20 mins\par >50% of time spent counseling/coordinating care\par \par \par Counseling included abnormal lab results, differential diagnoses, treatment options, risks and benefits, lifestyle changes, current condition, medications, and dose adjustments. \par The patient was interactive, attentive, asked questions, and verbalized understanding

## 2021-11-01 NOTE — HEALTH RISK ASSESSMENT
[0] : 2) Feeling down, depressed, or hopeless: Not at all (0) [PHQ-2 Negative - No further assessment needed] : PHQ-2 Negative - No further assessment needed [[id number]] : 0

## 2021-11-01 NOTE — PHYSICAL EXAM
[No Acute Distress] : no acute distress [Well Nourished] : well nourished [Well Developed] : well developed [Well-Appearing] : well-appearing [Normal Sclera/Conjunctiva] : normal sclera/conjunctiva [PERRL] : pupils equal round and reactive to light [EOMI] : extraocular movements intact [Normal Outer Ear/Nose] : the outer ears and nose were normal in appearance [Normal Oropharynx] : the oropharynx was normal [No JVD] : no jugular venous distention [No Lymphadenopathy] : no lymphadenopathy [Supple] : supple [Thyroid Normal, No Nodules] : the thyroid was normal and there were no nodules present [No Respiratory Distress] : no respiratory distress  [No Accessory Muscle Use] : no accessory muscle use [Clear to Auscultation] : lungs were clear to auscultation bilaterally [Normal Rate] : normal rate  [Regular Rhythm] : with a regular rhythm [Normal S1, S2] : normal S1 and S2 [No Murmur] : no murmur heard [No Carotid Bruits] : no carotid bruits [No Abdominal Bruit] : a ~M bruit was not heard ~T in the abdomen [No Varicosities] : no varicosities [Pedal Pulses Present] : the pedal pulses are present [No Edema] : there was no peripheral edema [No Palpable Aorta] : no palpable aorta [No Extremity Clubbing/Cyanosis] : no extremity clubbing/cyanosis [Soft] : abdomen soft [Non Tender] : non-tender [Non-distended] : non-distended [No Masses] : no abdominal mass palpated [No HSM] : no HSM [Normal Bowel Sounds] : normal bowel sounds [Normal Posterior Cervical Nodes] : no posterior cervical lymphadenopathy [Normal Anterior Cervical Nodes] : no anterior cervical lymphadenopathy [No CVA Tenderness] : no CVA  tenderness [No Spinal Tenderness] : no spinal tenderness [No Joint Swelling] : no joint swelling [Grossly Normal Strength/Tone] : grossly normal strength/tone [Coordination Grossly Intact] : coordination grossly intact [No Focal Deficits] : no focal deficits [Normal Gait] : normal gait [Deep Tendon Reflexes (DTR)] : deep tendon reflexes were 2+ and symmetric [Normal Affect] : the affect was normal [Normal Insight/Judgement] : insight and judgment were intact [de-identified] : 10 cm surgical scar across lower abdomen with evidence of wound dehiscences on left side which is 1 cm x 1 cm diameter and approximately 2 cm deep with lateral tracking. there is also central wound dehiscence opening is 1 cm diameter with tracking to the left.  minimal serosanguineous discharge from wound openings

## 2021-11-15 ENCOUNTER — APPOINTMENT (OUTPATIENT)
Dept: INTERNAL MEDICINE | Facility: CLINIC | Age: 46
End: 2021-11-15
Payer: COMMERCIAL

## 2021-11-15 VITALS
HEART RATE: 81 BPM | TEMPERATURE: 97.3 F | DIASTOLIC BLOOD PRESSURE: 87 MMHG | BODY MASS INDEX: 31.52 KG/M2 | SYSTOLIC BLOOD PRESSURE: 131 MMHG | HEIGHT: 68 IN | WEIGHT: 208 LBS

## 2021-11-15 DIAGNOSIS — Z98.84 BARIATRIC SURGERY STATUS: ICD-10-CM

## 2021-11-15 PROCEDURE — 99213 OFFICE O/P EST LOW 20 MIN: CPT

## 2021-11-15 RX ORDER — AMOXICILLIN AND CLAVULANATE POTASSIUM 500; 125 MG/1; MG/1
500-125 TABLET, FILM COATED ORAL TWICE DAILY
Qty: 20 | Refills: 0 | Status: COMPLETED | COMMUNITY
Start: 2021-11-01 | End: 2021-11-15

## 2021-11-15 NOTE — PHYSICAL EXAM
[No Acute Distress] : no acute distress [Well Nourished] : well nourished [Well Developed] : well developed [Well-Appearing] : well-appearing [Normal Sclera/Conjunctiva] : normal sclera/conjunctiva [PERRL] : pupils equal round and reactive to light [EOMI] : extraocular movements intact [Normal Outer Ear/Nose] : the outer ears and nose were normal in appearance [Normal Oropharynx] : the oropharynx was normal [No JVD] : no jugular venous distention [No Lymphadenopathy] : no lymphadenopathy [Supple] : supple [Thyroid Normal, No Nodules] : the thyroid was normal and there were no nodules present [No Respiratory Distress] : no respiratory distress  [No Accessory Muscle Use] : no accessory muscle use [Clear to Auscultation] : lungs were clear to auscultation bilaterally [Normal Rate] : normal rate  [Regular Rhythm] : with a regular rhythm [Normal S1, S2] : normal S1 and S2 [No Murmur] : no murmur heard [No Carotid Bruits] : no carotid bruits [No Abdominal Bruit] : a ~M bruit was not heard ~T in the abdomen [No Varicosities] : no varicosities [Pedal Pulses Present] : the pedal pulses are present [No Edema] : there was no peripheral edema [No Palpable Aorta] : no palpable aorta [No Extremity Clubbing/Cyanosis] : no extremity clubbing/cyanosis [Soft] : abdomen soft [Non Tender] : non-tender [Non-distended] : non-distended [No Masses] : no abdominal mass palpated [No HSM] : no HSM [Normal Bowel Sounds] : normal bowel sounds [Normal Posterior Cervical Nodes] : no posterior cervical lymphadenopathy [Normal Anterior Cervical Nodes] : no anterior cervical lymphadenopathy [No CVA Tenderness] : no CVA  tenderness [No Spinal Tenderness] : no spinal tenderness [No Joint Swelling] : no joint swelling [Grossly Normal Strength/Tone] : grossly normal strength/tone [Coordination Grossly Intact] : coordination grossly intact [No Focal Deficits] : no focal deficits [Normal Gait] : normal gait [Deep Tendon Reflexes (DTR)] : deep tendon reflexes were 2+ and symmetric [Normal Affect] : the affect was normal [Normal Insight/Judgement] : insight and judgment were intact [de-identified] : 10 cm surgical scar across lower abdomen with evidence of wound dehiscences on left side which is 0.5 cm x 0.5 cm diameter and approximately 2 cm deep with lateral tracking. there is also central wound dehiscence opening is 1 cm diameter with tracking to the left.  minimal serosanguineous discharge from wound openings

## 2021-11-15 NOTE — HEALTH RISK ASSESSMENT
[0] : 2) Feeling down, depressed, or hopeless: Not at all (0) [PHQ-2 Negative - No further assessment needed] : PHQ-2 Negative - No further assessment needed [[id number]] : 0

## 2021-11-15 NOTE — PLAN
[FreeTextEntry1] : Wound dehiscence with wound infection, Improving. minimal amounts of serious discharge drained from the wound. Wounds cleaned with normal saline and packed with plain packing strips.  patient will continue Visiting home nursing services.\par \par \par Patient will continue home Wound care for daily dressing changes from wound care team. \par \par will follow up in office in 2 weeks. \par \par patient's BP well controlled with current medication. will continue current  regimen \par \par Prior to appointment and during encounter with patient extensive medical records were reviewed including but not limited to, Hospital records records, out patient records, laboratory data and microbiology data \par In addition extensive time was also spent in reviewing diagnostic studies.\par \par Total encounter total time 20 mins\par >50% of time spent counseling/coordinating care\par \par \par Counseling included abnormal lab results, differential diagnoses, treatment options, risks and benefits, lifestyle changes, current condition, medications, and dose adjustments. \par The patient was interactive, attentive, asked questions, and verbalized understanding

## 2021-11-15 NOTE — HISTORY OF PRESENT ILLNESS
[FreeTextEntry1] : follow up  [de-identified] : Ms. GLENDY WALKER is a 46 year female with a PMH of HTN comes to the office for follow up of medical conditions and follow up of  infection of wound. patient had abdominoplasty and liposuction on 09/10/21 in [facility] [place] \par \par

## 2021-11-29 ENCOUNTER — APPOINTMENT (OUTPATIENT)
Dept: INTERNAL MEDICINE | Facility: CLINIC | Age: 46
End: 2021-11-29
Payer: COMMERCIAL

## 2021-11-29 VITALS
WEIGHT: 208 LBS | SYSTOLIC BLOOD PRESSURE: 130 MMHG | DIASTOLIC BLOOD PRESSURE: 75 MMHG | BODY MASS INDEX: 31.52 KG/M2 | HEIGHT: 68 IN

## 2021-11-29 PROCEDURE — 99213 OFFICE O/P EST LOW 20 MIN: CPT

## 2021-11-29 NOTE — HISTORY OF PRESENT ILLNESS
[FreeTextEntry1] : follow up  [de-identified] : Ms. GLENDY WALKER is a 46 year female with a PMH of HTN comes to the office for follow up of medical conditions and follow up of  infection of wound. patient had abdominoplasty and liposuction on 09/10/21 in [facility] [place] \par \par

## 2021-11-29 NOTE — PHYSICAL EXAM
[No Acute Distress] : no acute distress [Well Nourished] : well nourished [Well Developed] : well developed [Well-Appearing] : well-appearing [Normal Sclera/Conjunctiva] : normal sclera/conjunctiva [PERRL] : pupils equal round and reactive to light [EOMI] : extraocular movements intact [Normal Outer Ear/Nose] : the outer ears and nose were normal in appearance [Normal Oropharynx] : the oropharynx was normal [No JVD] : no jugular venous distention [No Lymphadenopathy] : no lymphadenopathy [Supple] : supple [Thyroid Normal, No Nodules] : the thyroid was normal and there were no nodules present [No Respiratory Distress] : no respiratory distress  [No Accessory Muscle Use] : no accessory muscle use [Clear to Auscultation] : lungs were clear to auscultation bilaterally [Normal Rate] : normal rate  [Regular Rhythm] : with a regular rhythm [Normal S1, S2] : normal S1 and S2 [No Murmur] : no murmur heard [No Carotid Bruits] : no carotid bruits [No Abdominal Bruit] : a ~M bruit was not heard ~T in the abdomen [No Varicosities] : no varicosities [Pedal Pulses Present] : the pedal pulses are present [No Edema] : there was no peripheral edema [No Palpable Aorta] : no palpable aorta [No Extremity Clubbing/Cyanosis] : no extremity clubbing/cyanosis [Soft] : abdomen soft [Non Tender] : non-tender [Non-distended] : non-distended [No Masses] : no abdominal mass palpated [No HSM] : no HSM [Normal Bowel Sounds] : normal bowel sounds [Normal Posterior Cervical Nodes] : no posterior cervical lymphadenopathy [Normal Anterior Cervical Nodes] : no anterior cervical lymphadenopathy [No CVA Tenderness] : no CVA  tenderness [No Spinal Tenderness] : no spinal tenderness [No Joint Swelling] : no joint swelling [Grossly Normal Strength/Tone] : grossly normal strength/tone [Coordination Grossly Intact] : coordination grossly intact [No Focal Deficits] : no focal deficits [Normal Gait] : normal gait [Deep Tendon Reflexes (DTR)] : deep tendon reflexes were 2+ and symmetric [Normal Affect] : the affect was normal [Normal Insight/Judgement] : insight and judgment were intact [de-identified] : 10 cm surgical scar across lower abdomen with evidence of wound dehiscences on left side which is 0.5 cm x 0.5 cm diameter and approximately 2 cm deep with lateral tracking. there is also central wound dehiscence opening is 1 cm diameter with tracking to the left.  minimal serosanguineous discharge

## 2021-11-29 NOTE — HEALTH RISK ASSESSMENT
[0] : 2) Feeling down, depressed, or hopeless: Not at all (0) [PHQ-2 Negative - No further assessment needed] : PHQ-2 Negative - No further assessment needed [[id number]] : 0

## 2021-12-06 ENCOUNTER — APPOINTMENT (OUTPATIENT)
Dept: INTERNAL MEDICINE | Facility: CLINIC | Age: 46
End: 2021-12-06
Payer: COMMERCIAL

## 2021-12-06 VITALS
SYSTOLIC BLOOD PRESSURE: 142 MMHG | HEART RATE: 92 BPM | BODY MASS INDEX: 31.52 KG/M2 | TEMPERATURE: 97.1 F | WEIGHT: 208 LBS | DIASTOLIC BLOOD PRESSURE: 92 MMHG | HEIGHT: 68 IN

## 2021-12-06 PROCEDURE — 99213 OFFICE O/P EST LOW 20 MIN: CPT

## 2021-12-06 NOTE — HEALTH RISK ASSESSMENT
[0] : 2) Feeling down, depressed, or hopeless: Not at all (0) [PHQ-2 Negative - No further assessment needed] : PHQ-2 Negative - No further assessment needed [[id number]] : 0

## 2021-12-06 NOTE — PLAN
[FreeTextEntry1] : Wound dehiscence with wound infection, Improving. minimal amounts of serious discharge drained from the wound. Wounds cleaned with normal saline and packed with plain packing strips. \par \par Patient will continue home Wound care for daily dressing changes\par \par will follow up in office in 4 days\par \par patient's BP well controlled with current medication. will continue current  regimen \par \par Prior to appointment and during encounter with patient extensive medical records were reviewed including but not limited to, Hospital records records, out patient records, laboratory data and microbiology data \par In addition extensive time was also spent in reviewing diagnostic studies.\par \par Total encounter total time 20 mins\par >50% of time spent counseling/coordinating care\par \par \par Counseling included abnormal lab results, differential diagnoses, treatment options, risks and benefits, lifestyle changes, current condition, medications, and dose adjustments. \par The patient was interactive, attentive, asked questions, and verbalized understanding

## 2021-12-06 NOTE — HISTORY OF PRESENT ILLNESS
[FreeTextEntry1] : follow up  [de-identified] : Ms. GLENDY WALKER is a 46 year female with a PMH of HTN comes to the office for follow up of medical conditions and follow up of  infection of wound. patient had abdominoplasty and liposuction on 09/10/21 in [facility] [place] Patient denies fever, cough SOB. No other complaints at this time. \par \par

## 2021-12-06 NOTE — PHYSICAL EXAM
[No Acute Distress] : no acute distress [Well Nourished] : well nourished [Well Developed] : well developed [Well-Appearing] : well-appearing [Normal Sclera/Conjunctiva] : normal sclera/conjunctiva [PERRL] : pupils equal round and reactive to light [EOMI] : extraocular movements intact [Normal Outer Ear/Nose] : the outer ears and nose were normal in appearance [Normal Oropharynx] : the oropharynx was normal [No JVD] : no jugular venous distention [No Lymphadenopathy] : no lymphadenopathy [Supple] : supple [Thyroid Normal, No Nodules] : the thyroid was normal and there were no nodules present [No Respiratory Distress] : no respiratory distress  [No Accessory Muscle Use] : no accessory muscle use [Clear to Auscultation] : lungs were clear to auscultation bilaterally [Normal Rate] : normal rate  [Regular Rhythm] : with a regular rhythm [Normal S1, S2] : normal S1 and S2 [No Murmur] : no murmur heard [No Carotid Bruits] : no carotid bruits [No Abdominal Bruit] : a ~M bruit was not heard ~T in the abdomen [No Varicosities] : no varicosities [Pedal Pulses Present] : the pedal pulses are present [No Edema] : there was no peripheral edema [No Palpable Aorta] : no palpable aorta [No Extremity Clubbing/Cyanosis] : no extremity clubbing/cyanosis [Soft] : abdomen soft [Non Tender] : non-tender [Non-distended] : non-distended [No Masses] : no abdominal mass palpated [No HSM] : no HSM [Normal Bowel Sounds] : normal bowel sounds [Normal Posterior Cervical Nodes] : no posterior cervical lymphadenopathy [Normal Anterior Cervical Nodes] : no anterior cervical lymphadenopathy [No CVA Tenderness] : no CVA  tenderness [No Spinal Tenderness] : no spinal tenderness [No Joint Swelling] : no joint swelling [Grossly Normal Strength/Tone] : grossly normal strength/tone [Coordination Grossly Intact] : coordination grossly intact [No Focal Deficits] : no focal deficits [Normal Gait] : normal gait [Deep Tendon Reflexes (DTR)] : deep tendon reflexes were 2+ and symmetric [Normal Affect] : the affect was normal [Normal Insight/Judgement] : insight and judgment were intact [de-identified] : 10 cm surgical scar across lower abdomen with evidence of wound dehiscences on left side which is 0.5 cm x 0.5 cm diameter and approximately 2 cm deep with lateral tracking. there is also central wound dehiscence opening is 1 cm diameter with tracking to the left.  minimal serosanguineous discharge

## 2021-12-10 ENCOUNTER — APPOINTMENT (OUTPATIENT)
Dept: INTERNAL MEDICINE | Facility: CLINIC | Age: 46
End: 2021-12-10

## 2021-12-13 ENCOUNTER — APPOINTMENT (OUTPATIENT)
Dept: INTERNAL MEDICINE | Facility: CLINIC | Age: 46
End: 2021-12-13
Payer: COMMERCIAL

## 2021-12-13 VITALS
DIASTOLIC BLOOD PRESSURE: 87 MMHG | HEIGHT: 68 IN | SYSTOLIC BLOOD PRESSURE: 132 MMHG | BODY MASS INDEX: 31.52 KG/M2 | WEIGHT: 208 LBS | HEART RATE: 91 BPM

## 2021-12-13 DIAGNOSIS — T14.8XXA OTHER INJURY OF UNSPECIFIED BODY REGION, INITIAL ENCOUNTER: ICD-10-CM

## 2021-12-13 DIAGNOSIS — L08.9 OTHER INJURY OF UNSPECIFIED BODY REGION, INITIAL ENCOUNTER: ICD-10-CM

## 2021-12-13 PROCEDURE — 99213 OFFICE O/P EST LOW 20 MIN: CPT

## 2021-12-13 NOTE — HEALTH RISK ASSESSMENT
[0] : 2) Feeling down, depressed, or hopeless: Not at all (0) [PHQ-2 Negative - No further assessment needed] : PHQ-2 Negative - No further assessment needed [[id number]] : 0

## 2021-12-13 NOTE — PHYSICAL EXAM
[No Acute Distress] : no acute distress [Well Nourished] : well nourished [Well Developed] : well developed [Well-Appearing] : well-appearing [Normal Sclera/Conjunctiva] : normal sclera/conjunctiva [PERRL] : pupils equal round and reactive to light [EOMI] : extraocular movements intact [Normal Outer Ear/Nose] : the outer ears and nose were normal in appearance [Normal Oropharynx] : the oropharynx was normal [No JVD] : no jugular venous distention [No Lymphadenopathy] : no lymphadenopathy [Supple] : supple [Thyroid Normal, No Nodules] : the thyroid was normal and there were no nodules present [No Respiratory Distress] : no respiratory distress  [No Accessory Muscle Use] : no accessory muscle use [Clear to Auscultation] : lungs were clear to auscultation bilaterally [Normal Rate] : normal rate  [Regular Rhythm] : with a regular rhythm [Normal S1, S2] : normal S1 and S2 [No Murmur] : no murmur heard [No Carotid Bruits] : no carotid bruits [No Abdominal Bruit] : a ~M bruit was not heard ~T in the abdomen [No Varicosities] : no varicosities [Pedal Pulses Present] : the pedal pulses are present [No Edema] : there was no peripheral edema [No Palpable Aorta] : no palpable aorta [No Extremity Clubbing/Cyanosis] : no extremity clubbing/cyanosis [Soft] : abdomen soft [Non Tender] : non-tender [Non-distended] : non-distended [No Masses] : no abdominal mass palpated [No HSM] : no HSM [Normal Bowel Sounds] : normal bowel sounds [Normal Posterior Cervical Nodes] : no posterior cervical lymphadenopathy [Normal Anterior Cervical Nodes] : no anterior cervical lymphadenopathy [No CVA Tenderness] : no CVA  tenderness [No Spinal Tenderness] : no spinal tenderness [No Joint Swelling] : no joint swelling [Grossly Normal Strength/Tone] : grossly normal strength/tone [Coordination Grossly Intact] : coordination grossly intact [No Focal Deficits] : no focal deficits [Normal Gait] : normal gait [Deep Tendon Reflexes (DTR)] : deep tendon reflexes were 2+ and symmetric [Normal Affect] : the affect was normal [Normal Insight/Judgement] : insight and judgment were intact [de-identified] : 10 cm surgical scar across lower abdomen with evidence of wound dehiscences on left side which is 0.5 cm x 0.5 cm diameter and approximately 2 cm deep with lateral tracking. there is also central wound dehiscence opening is 1 cm diameter with tracking to the left.  minimal serosanguineous discharge

## 2021-12-13 NOTE — HISTORY OF PRESENT ILLNESS
[FreeTextEntry1] : follow up  [de-identified] : Ms. GLENDY WALKER is a 46 year female with a PMH of HTN comes to the office for follow up of medical conditions and follow up of  infection of wound. patient had abdominoplasty and liposuction on 09/10/21 in [facility] [place] Patient denies fever, cough SOB. No other complaints at this time. \par \par

## 2021-12-13 NOTE — PLAN
[FreeTextEntry1] : Wound dehiscence with wound infection, Improving. minimal amounts of serious discharge drained from the wound. Wounds cleaned with normal saline and packed with plain packing strips. \par \par culture obtained today, will call patient with results\par \par Patient will continue home Wound care for daily dressing changes\par \par will follow up in office in 4 days\par \par patient's BP well controlled with current medication. will continue current  regimen \par \par Prior to appointment and during encounter with patient extensive medical records were reviewed including but not limited to, Hospital records records, out patient records, laboratory data and microbiology data \par In addition extensive time was also spent in reviewing diagnostic studies.\par \par Total encounter total time 20 mins\par >50% of time spent counseling/coordinating care\par \par \par Counseling included abnormal lab results, differential diagnoses, treatment options, risks and benefits, lifestyle changes, current condition, medications, and dose adjustments. \par The patient was interactive, attentive, asked questions, and verbalized understanding

## 2021-12-17 ENCOUNTER — APPOINTMENT (OUTPATIENT)
Dept: INTERNAL MEDICINE | Facility: CLINIC | Age: 46
End: 2021-12-17
Payer: COMMERCIAL

## 2021-12-17 VITALS
HEIGHT: 68 IN | BODY MASS INDEX: 31.52 KG/M2 | WEIGHT: 208 LBS | DIASTOLIC BLOOD PRESSURE: 83 MMHG | HEART RATE: 87 BPM | SYSTOLIC BLOOD PRESSURE: 127 MMHG

## 2021-12-17 PROCEDURE — 99213 OFFICE O/P EST LOW 20 MIN: CPT

## 2021-12-17 NOTE — PHYSICAL EXAM
[No Acute Distress] : no acute distress [Well Nourished] : well nourished [Well Developed] : well developed [Well-Appearing] : well-appearing [Normal Sclera/Conjunctiva] : normal sclera/conjunctiva [PERRL] : pupils equal round and reactive to light [EOMI] : extraocular movements intact [Normal Outer Ear/Nose] : the outer ears and nose were normal in appearance [Normal Oropharynx] : the oropharynx was normal [No JVD] : no jugular venous distention [No Lymphadenopathy] : no lymphadenopathy [Supple] : supple [Thyroid Normal, No Nodules] : the thyroid was normal and there were no nodules present [No Respiratory Distress] : no respiratory distress  [No Accessory Muscle Use] : no accessory muscle use [Clear to Auscultation] : lungs were clear to auscultation bilaterally [Normal Rate] : normal rate  [Regular Rhythm] : with a regular rhythm [Normal S1, S2] : normal S1 and S2 [No Murmur] : no murmur heard [No Carotid Bruits] : no carotid bruits [No Abdominal Bruit] : a ~M bruit was not heard ~T in the abdomen [No Varicosities] : no varicosities [Pedal Pulses Present] : the pedal pulses are present [No Edema] : there was no peripheral edema [No Palpable Aorta] : no palpable aorta [No Extremity Clubbing/Cyanosis] : no extremity clubbing/cyanosis [Soft] : abdomen soft [Non Tender] : non-tender [Non-distended] : non-distended [No Masses] : no abdominal mass palpated [No HSM] : no HSM [Normal Bowel Sounds] : normal bowel sounds [Normal Posterior Cervical Nodes] : no posterior cervical lymphadenopathy [Normal Anterior Cervical Nodes] : no anterior cervical lymphadenopathy [No CVA Tenderness] : no CVA  tenderness [No Spinal Tenderness] : no spinal tenderness [No Joint Swelling] : no joint swelling [Grossly Normal Strength/Tone] : grossly normal strength/tone [Coordination Grossly Intact] : coordination grossly intact [No Focal Deficits] : no focal deficits [Normal Gait] : normal gait [Deep Tendon Reflexes (DTR)] : deep tendon reflexes were 2+ and symmetric [Normal Affect] : the affect was normal [Normal Insight/Judgement] : insight and judgment were intact [de-identified] : 10 cm surgical scar across lower abdomen with evidence of wound dehiscences on left side which is 0.5 cm x 0.5 cm diameter and approximately 2 cm deep with lateral tracking. there is also central wound dehiscence opening is 1 cm diameter with tracking to the left.  minimal serosanguineous discharge

## 2021-12-17 NOTE — HEALTH RISK ASSESSMENT
[0] : 2) Feeling down, depressed, or hopeless: Not at all (0) [PHQ-2 Negative - No further assessment needed] : PHQ-2 Negative - No further assessment needed [[id number]] : 0

## 2021-12-17 NOTE — PLAN
[FreeTextEntry1] : Wound dehiscence with wound infection, Improving. minimal amounts of serious discharge drained from the wound. Wounds cleaned with normal saline and packed with plain packing strips. \par \par antibiotics started base on recent wound culture. \par \par Patient will continue home Wound care for daily dressing changes\par \par will follow up in office in 3 days\par \par patient's BP well controlled with current medication. will continue current  regimen \par \par Regarding patient's obesity\par - encourage lifestyle modifications\par - diet and exercise\par - reduce calorie intake\par - no soda/ junk food/ snacks\par - consider mixed grains/ whole grains, leafy vegetables, and an appropriate serving of protein \par \par Prior to appointment and during encounter with patient extensive medical records were reviewed including but not limited to, Hospital records records, out patient records, laboratory data and microbiology data \par In addition extensive time was also spent in reviewing diagnostic studies.\par \par Total encounter total time 20 mins\par >50% of time spent counseling/coordinating care\par \par \par Counseling included abnormal lab results, differential diagnoses, treatment options, risks and benefits, lifestyle changes, current condition, medications, and dose adjustments. \par The patient was interactive, attentive, asked questions, and verbalized understanding

## 2021-12-17 NOTE — HISTORY OF PRESENT ILLNESS
[FreeTextEntry1] : follow up  [de-identified] : Ms. GLENDY WALKER is a 46 year female with a PMH of HTN comes to the office for follow up of medical conditions and follow up of  infection of wound. patient had abdominoplasty and liposuction on 09/10/21 in [facility] [place] Patient denies fever, cough SOB. No other complaints at this time. \par \par

## 2021-12-19 LAB — BACTERIA WND CULT: ABNORMAL

## 2021-12-20 ENCOUNTER — APPOINTMENT (OUTPATIENT)
Dept: INTERNAL MEDICINE | Facility: CLINIC | Age: 46
End: 2021-12-20
Payer: COMMERCIAL

## 2021-12-20 VITALS
BODY MASS INDEX: 31.52 KG/M2 | WEIGHT: 208 LBS | HEART RATE: 76 BPM | DIASTOLIC BLOOD PRESSURE: 87 MMHG | HEIGHT: 68 IN | SYSTOLIC BLOOD PRESSURE: 118 MMHG

## 2021-12-20 PROCEDURE — 99213 OFFICE O/P EST LOW 20 MIN: CPT

## 2021-12-20 NOTE — HEALTH RISK ASSESSMENT
[0] : 2) Feeling down, depressed, or hopeless: Not at all (0) [PHQ-2 Negative - No further assessment needed] : PHQ-2 Negative - No further assessment needed [[id number]] : 0

## 2021-12-20 NOTE — PHYSICAL EXAM
[No Acute Distress] : no acute distress [Well Nourished] : well nourished [Well Developed] : well developed [Well-Appearing] : well-appearing [Normal Sclera/Conjunctiva] : normal sclera/conjunctiva [PERRL] : pupils equal round and reactive to light [EOMI] : extraocular movements intact [Normal Outer Ear/Nose] : the outer ears and nose were normal in appearance [Normal Oropharynx] : the oropharynx was normal [No JVD] : no jugular venous distention [No Lymphadenopathy] : no lymphadenopathy [Supple] : supple [Thyroid Normal, No Nodules] : the thyroid was normal and there were no nodules present [No Respiratory Distress] : no respiratory distress  [No Accessory Muscle Use] : no accessory muscle use [Clear to Auscultation] : lungs were clear to auscultation bilaterally [Normal Rate] : normal rate  [Regular Rhythm] : with a regular rhythm [Normal S1, S2] : normal S1 and S2 [No Murmur] : no murmur heard [No Carotid Bruits] : no carotid bruits [No Abdominal Bruit] : a ~M bruit was not heard ~T in the abdomen [No Varicosities] : no varicosities [Pedal Pulses Present] : the pedal pulses are present [No Edema] : there was no peripheral edema [No Palpable Aorta] : no palpable aorta [No Extremity Clubbing/Cyanosis] : no extremity clubbing/cyanosis [Soft] : abdomen soft [Non Tender] : non-tender [Non-distended] : non-distended [No Masses] : no abdominal mass palpated [No HSM] : no HSM [Normal Bowel Sounds] : normal bowel sounds [Normal Posterior Cervical Nodes] : no posterior cervical lymphadenopathy [Normal Anterior Cervical Nodes] : no anterior cervical lymphadenopathy [No CVA Tenderness] : no CVA  tenderness [No Spinal Tenderness] : no spinal tenderness [No Joint Swelling] : no joint swelling [Grossly Normal Strength/Tone] : grossly normal strength/tone [Coordination Grossly Intact] : coordination grossly intact [No Focal Deficits] : no focal deficits [Normal Gait] : normal gait [Deep Tendon Reflexes (DTR)] : deep tendon reflexes were 2+ and symmetric [Normal Affect] : the affect was normal [Normal Insight/Judgement] : insight and judgment were intact [de-identified] : 10 cm surgical scar across lower abdomen with evidence of wound dehiscences on left side which is 0.5 cm x 0.5 cm diameter and approximately 2 cm deep with lateral tracking. there is also central wound dehiscence opening is 1 cm diameter with tracking to the left.  minimal serosanguineous discharge

## 2021-12-20 NOTE — PLAN
[FreeTextEntry1] : Wound dehiscence with wound infection, Improving. minimal amounts of serious discharge drained from the wound. Wounds cleaned with normal saline and packed with plain packing strips. \par \par antibiotics started base on recent wound culture. \par \par Patient will continue home Wound care for daily dressing changes\par \par will follow up in office in 2 weeks \par \par patient's BP well controlled with current medication. will continue current  regimen \par \par Regarding patient's obesity\par - encourage lifestyle modifications\par - diet and exercise\par - reduce calorie intake\par - no soda/ junk food/ snacks\par - consider mixed grains/ whole grains, leafy vegetables, and an appropriate serving of protein \par \par Prior to appointment and during encounter with patient extensive medical records were reviewed including but not limited to, Hospital records records, out patient records, laboratory data and microbiology data \par In addition extensive time was also spent in reviewing diagnostic studies.\par \par Total encounter total time 20 mins\par >50% of time spent counseling/coordinating care\par \par \par Counseling included abnormal lab results, differential diagnoses, treatment options, risks and benefits, lifestyle changes, current condition, medications, and dose adjustments. \par The patient was interactive, attentive, asked questions, and verbalized understanding

## 2021-12-20 NOTE — HISTORY OF PRESENT ILLNESS
[FreeTextEntry1] : follow up  [de-identified] : Ms. GLENDY WALKER is a 46 year female with a PMH of HTN comes to the office for follow up of medical conditions and follow up of  infection of wound. patient had abdominoplasty and liposuction on 09/10/21 in [facility] [place] Patient denies fever, cough SOB. No other complaints at this time. \par \par

## 2022-01-03 ENCOUNTER — APPOINTMENT (OUTPATIENT)
Dept: INTERNAL MEDICINE | Facility: CLINIC | Age: 47
End: 2022-01-03
Payer: COMMERCIAL

## 2022-01-03 VITALS
SYSTOLIC BLOOD PRESSURE: 125 MMHG | HEIGHT: 68 IN | WEIGHT: 200 LBS | BODY MASS INDEX: 30.31 KG/M2 | DIASTOLIC BLOOD PRESSURE: 75 MMHG

## 2022-01-03 DIAGNOSIS — T81.49XA INFECTION FOLLOWING A PROCEDURE, OTHER SURGICAL SITE, INITIAL ENCOUNTER: ICD-10-CM

## 2022-01-03 PROCEDURE — 99213 OFFICE O/P EST LOW 20 MIN: CPT

## 2022-01-03 NOTE — HEALTH RISK ASSESSMENT
[0] : 2) Feeling down, depressed, or hopeless: Not at all (0) [PHQ-2 Negative - No further assessment needed] : PHQ-2 Negative - No further assessment needed [[id number]] : 0

## 2022-01-03 NOTE — PLAN
[FreeTextEntry1] : Wound dehiscence with wound infection, Improving. minimal amounts of serious discharge drained from the wound. Wounds cleaned with normal saline and packed with plain packing strips. \par \par Patient will continue home Wound care for daily dressing changes\par \par will follow up in office in 2 weeks \par \par patient's BP well controlled with current medication. will continue current  regimen \par \par Regarding patient's obesity\par - encourage lifestyle modifications\par - diet and exercise\par - reduce calorie intake\par - no soda/ junk food/ snacks\par - consider mixed grains/ whole grains, leafy vegetables, and an appropriate serving of protein \par \par Prior to appointment and during encounter with patient extensive medical records were reviewed including but not limited to, Hospital records records, out patient records, laboratory data and microbiology data \par In addition extensive time was also spent in reviewing diagnostic studies.\par \par Total encounter total time 20 mins\par >50% of time spent counseling/coordinating care\par \par \par Counseling included abnormal lab results, differential diagnoses, treatment options, risks and benefits, lifestyle changes, current condition, medications, and dose adjustments. \par The patient was interactive, attentive, asked questions, and verbalized understanding

## 2022-01-03 NOTE — HISTORY OF PRESENT ILLNESS
[FreeTextEntry1] : follow up  [de-identified] : Ms. GLENDY WALKER is a 46 year female with a PMH of HTN comes to the office for follow up of medical conditions and follow up of  infection of wound. patient had abdominoplasty and liposuction on 09/10/21 in [facility] [place] Patient denies fever, cough SOB. No other complaints at this time. \par \par

## 2022-01-03 NOTE — PHYSICAL EXAM
[No Acute Distress] : no acute distress [Well Nourished] : well nourished [Well Developed] : well developed [Well-Appearing] : well-appearing [Normal Sclera/Conjunctiva] : normal sclera/conjunctiva [PERRL] : pupils equal round and reactive to light [EOMI] : extraocular movements intact [Normal Outer Ear/Nose] : the outer ears and nose were normal in appearance [Normal Oropharynx] : the oropharynx was normal [No JVD] : no jugular venous distention [No Lymphadenopathy] : no lymphadenopathy [Supple] : supple [Thyroid Normal, No Nodules] : the thyroid was normal and there were no nodules present [No Respiratory Distress] : no respiratory distress  [No Accessory Muscle Use] : no accessory muscle use [Clear to Auscultation] : lungs were clear to auscultation bilaterally [Normal Rate] : normal rate  [Regular Rhythm] : with a regular rhythm [Normal S1, S2] : normal S1 and S2 [No Murmur] : no murmur heard [No Carotid Bruits] : no carotid bruits [No Abdominal Bruit] : a ~M bruit was not heard ~T in the abdomen [No Varicosities] : no varicosities [Pedal Pulses Present] : the pedal pulses are present [No Edema] : there was no peripheral edema [No Palpable Aorta] : no palpable aorta [No Extremity Clubbing/Cyanosis] : no extremity clubbing/cyanosis [Soft] : abdomen soft [Non Tender] : non-tender [Non-distended] : non-distended [No Masses] : no abdominal mass palpated [No HSM] : no HSM [Normal Bowel Sounds] : normal bowel sounds [Normal Posterior Cervical Nodes] : no posterior cervical lymphadenopathy [Normal Anterior Cervical Nodes] : no anterior cervical lymphadenopathy [No CVA Tenderness] : no CVA  tenderness [No Spinal Tenderness] : no spinal tenderness [No Joint Swelling] : no joint swelling [Grossly Normal Strength/Tone] : grossly normal strength/tone [Coordination Grossly Intact] : coordination grossly intact [No Focal Deficits] : no focal deficits [Normal Gait] : normal gait [Deep Tendon Reflexes (DTR)] : deep tendon reflexes were 2+ and symmetric [Normal Affect] : the affect was normal [Normal Insight/Judgement] : insight and judgment were intact [de-identified] : 10 cm surgical scar across lower abdomen with evidence of wound dehiscences on left side which is 0.5 cm x 0.5 cm diameter and approximately 2 cm deep with lateral tracking. there is also central wound dehiscence opening is 1 cm diameter with tracking to the left.  minimal serosanguineous discharge

## 2022-01-10 ENCOUNTER — APPOINTMENT (OUTPATIENT)
Dept: INTERNAL MEDICINE | Facility: CLINIC | Age: 47
End: 2022-01-10
Payer: COMMERCIAL

## 2022-01-10 VITALS
HEART RATE: 81 BPM | DIASTOLIC BLOOD PRESSURE: 82 MMHG | BODY MASS INDEX: 30.31 KG/M2 | SYSTOLIC BLOOD PRESSURE: 139 MMHG | WEIGHT: 200 LBS | HEIGHT: 68 IN

## 2022-01-10 PROCEDURE — 99213 OFFICE O/P EST LOW 20 MIN: CPT

## 2022-01-10 NOTE — PHYSICAL EXAM
[No Acute Distress] : no acute distress [Well Nourished] : well nourished [Well Developed] : well developed [Well-Appearing] : well-appearing [Normal Sclera/Conjunctiva] : normal sclera/conjunctiva [PERRL] : pupils equal round and reactive to light [EOMI] : extraocular movements intact [Normal Outer Ear/Nose] : the outer ears and nose were normal in appearance [Normal Oropharynx] : the oropharynx was normal [No JVD] : no jugular venous distention [No Lymphadenopathy] : no lymphadenopathy [Supple] : supple [Thyroid Normal, No Nodules] : the thyroid was normal and there were no nodules present [No Respiratory Distress] : no respiratory distress  [No Accessory Muscle Use] : no accessory muscle use [Clear to Auscultation] : lungs were clear to auscultation bilaterally [Normal Rate] : normal rate  [Regular Rhythm] : with a regular rhythm [Normal S1, S2] : normal S1 and S2 [No Murmur] : no murmur heard [No Carotid Bruits] : no carotid bruits [No Abdominal Bruit] : a ~M bruit was not heard ~T in the abdomen [No Varicosities] : no varicosities [Pedal Pulses Present] : the pedal pulses are present [No Edema] : there was no peripheral edema [No Palpable Aorta] : no palpable aorta [No Extremity Clubbing/Cyanosis] : no extremity clubbing/cyanosis [Soft] : abdomen soft [Non Tender] : non-tender [Non-distended] : non-distended [No Masses] : no abdominal mass palpated [No HSM] : no HSM [Normal Bowel Sounds] : normal bowel sounds [Normal Posterior Cervical Nodes] : no posterior cervical lymphadenopathy [Normal Anterior Cervical Nodes] : no anterior cervical lymphadenopathy [No CVA Tenderness] : no CVA  tenderness [No Spinal Tenderness] : no spinal tenderness [No Joint Swelling] : no joint swelling [Grossly Normal Strength/Tone] : grossly normal strength/tone [Coordination Grossly Intact] : coordination grossly intact [No Focal Deficits] : no focal deficits [Normal Gait] : normal gait [Deep Tendon Reflexes (DTR)] : deep tendon reflexes were 2+ and symmetric [Normal Affect] : the affect was normal [Normal Insight/Judgement] : insight and judgment were intact [de-identified] : 10 cm surgical scar across lower abdomen fully healed.

## 2022-01-10 NOTE — HISTORY OF PRESENT ILLNESS
[FreeTextEntry1] : follow up  [de-identified] : Ms. GLENDY WALKER is a 46 year female with a PMH of HTN comes to the office for follow up of medical conditions and follow up of  infection of wound. patient had abdominoplasty and liposuction on 09/10/21 in [facility] [place] Patient denies fever, cough SOB. No other complaints at this time. \par \par

## 2022-01-10 NOTE — PLAN
[FreeTextEntry1] : Wound dehiscence fully healed \par \par patient's BP well controlled with current medication. will continue current  regimen \par \par Regarding patient's obesity\par - encourage lifestyle modifications\par - diet and exercise\par - reduce calorie intake\par - no soda/ junk food/ snacks\par - consider mixed grains/ whole grains, leafy vegetables, and an appropriate serving of protein \par \par Prior to appointment and during encounter with patient extensive medical records were reviewed including but not limited to, Hospital records records, out patient records, laboratory data and microbiology data \par In addition extensive time was also spent in reviewing diagnostic studies.\par \par Total encounter total time 20 mins\par >50% of time spent counseling/coordinating care\par \par \par Counseling included abnormal lab results, differential diagnoses, treatment options, risks and benefits, lifestyle changes, current condition, medications, and dose adjustments. \par The patient was interactive, attentive, asked questions, and verbalized understanding

## 2022-01-10 NOTE — HEALTH RISK ASSESSMENT
[0] : 2) Feeling down, depressed, or hopeless: Not at all (0) [PHQ-2 Negative - No further assessment needed] : PHQ-2 Negative - No further assessment needed [[id number]] : 0

## 2022-02-26 ENCOUNTER — RX RENEWAL (OUTPATIENT)
Age: 47
End: 2022-02-26

## 2022-04-07 ENCOUNTER — APPOINTMENT (OUTPATIENT)
Dept: INTERNAL MEDICINE | Facility: CLINIC | Age: 47
End: 2022-04-07
Payer: COMMERCIAL

## 2022-04-07 DIAGNOSIS — J01.00 ACUTE MAXILLARY SINUSITIS, UNSPECIFIED: ICD-10-CM

## 2022-04-07 PROCEDURE — 99442: CPT

## 2022-05-09 ENCOUNTER — APPOINTMENT (OUTPATIENT)
Dept: INTERNAL MEDICINE | Facility: CLINIC | Age: 47
End: 2022-05-09
Payer: COMMERCIAL

## 2022-05-09 VITALS
WEIGHT: 210 LBS | OXYGEN SATURATION: 99 % | HEART RATE: 73 BPM | SYSTOLIC BLOOD PRESSURE: 140 MMHG | BODY MASS INDEX: 31.1 KG/M2 | DIASTOLIC BLOOD PRESSURE: 80 MMHG | HEIGHT: 69 IN

## 2022-05-09 DIAGNOSIS — Z00.00 ENCOUNTER FOR GENERAL ADULT MEDICAL EXAMINATION W/OUT ABNORMAL FINDINGS: ICD-10-CM

## 2022-05-09 DIAGNOSIS — R73.03 PREDIABETES.: ICD-10-CM

## 2022-05-09 DIAGNOSIS — D72.829 ELEVATED WHITE BLOOD CELL COUNT, UNSPECIFIED: ICD-10-CM

## 2022-05-09 DIAGNOSIS — Z86.39 PERSONAL HISTORY OF OTHER ENDOCRINE, NUTRITIONAL AND METABOLIC DISEASE: ICD-10-CM

## 2022-05-09 DIAGNOSIS — R05.9 COUGH, UNSPECIFIED: ICD-10-CM

## 2022-05-09 DIAGNOSIS — Z13.29 ENCOUNTER FOR SCREENING FOR OTHER SUSPECTED ENDOCRINE DISORDER: ICD-10-CM

## 2022-05-09 PROCEDURE — 36415 COLL VENOUS BLD VENIPUNCTURE: CPT

## 2022-05-09 PROCEDURE — 99214 OFFICE O/P EST MOD 30 MIN: CPT | Mod: 25

## 2022-05-09 NOTE — PLAN
[FreeTextEntry1] : PT HERE FOR   FOLLOWUP  EXAM FEELS   HAD COUGH AND HAD A RX FOR CXR WHICH WAS DONE TODAY RESULTS PENDING\par PT FEELING [facility] GIVEN FOR AZELASTINE SPRAY WITH IMPROVEMENT\par NO FEVER OR CHILLS HERE FOR FOLLOWUP \par WILL OBTAIN CXR RESULTS \par DEFER FURTHER ABX \par WILL CHECK LABS

## 2022-05-09 NOTE — HISTORY OF PRESENT ILLNESS
[FreeTextEntry1] : FOLLOWUP ON  COUGH [de-identified] : PT HERE FOR   FOLLOWUP  EXAM FEELS   HAD COUGH AND HAD A RX FOR CXR WHICH WAS DONE OTDAY REASULTS PENDING\par PT FEELING BETERE RX GIVEN FOR AZELASTINE SPRAY WITH IMPROVEMENT\par NO FEVR OR CHILLS HERE FOR FOLLOUWP

## 2022-05-10 LAB
ALBUMIN SERPL ELPH-MCNC: 4.5 G/DL
ALP BLD-CCNC: 62 U/L
ALT SERPL-CCNC: 27 U/L
ANION GAP SERPL CALC-SCNC: 12 MMOL/L
AST SERPL-CCNC: 22 U/L
BASOPHILS # BLD AUTO: 0.06 K/UL
BASOPHILS NFR BLD AUTO: 0.8 %
BILIRUB SERPL-MCNC: 0.7 MG/DL
BUN SERPL-MCNC: 8 MG/DL
CALCIUM SERPL-MCNC: 9.3 MG/DL
CHLORIDE SERPL-SCNC: 107 MMOL/L
CHOLEST SERPL-MCNC: 167 MG/DL
CO2 SERPL-SCNC: 22 MMOL/L
CREAT SERPL-MCNC: 0.56 MG/DL
EGFR: 113 ML/MIN/1.73M2
EOSINOPHIL # BLD AUTO: 0.51 K/UL
EOSINOPHIL NFR BLD AUTO: 6.5 %
ESTIMATED AVERAGE GLUCOSE: 82 MG/DL
GLUCOSE SERPL-MCNC: 93 MG/DL
HBA1C MFR BLD HPLC: 4.5 %
HCT VFR BLD CALC: 39.3 %
HDLC SERPL-MCNC: 58 MG/DL
HGB BLD-MCNC: 12.1 G/DL
IMM GRANULOCYTES NFR BLD AUTO: 0.1 %
LDLC SERPL CALC-MCNC: 95 MG/DL
LYMPHOCYTES # BLD AUTO: 2.11 K/UL
LYMPHOCYTES NFR BLD AUTO: 27 %
MAN DIFF?: NORMAL
MCHC RBC-ENTMCNC: 30.8 GM/DL
MCHC RBC-ENTMCNC: 31.3 PG
MCV RBC AUTO: 101.8 FL
MONOCYTES # BLD AUTO: 0.53 K/UL
MONOCYTES NFR BLD AUTO: 6.8 %
NEUTROPHILS # BLD AUTO: 4.6 K/UL
NEUTROPHILS NFR BLD AUTO: 58.8 %
NONHDLC SERPL-MCNC: 109 MG/DL
PLATELET # BLD AUTO: 290 K/UL
POTASSIUM SERPL-SCNC: 4.1 MMOL/L
PROT SERPL-MCNC: 6.9 G/DL
RBC # BLD: 3.86 M/UL
RBC # FLD: 13 %
SODIUM SERPL-SCNC: 142 MMOL/L
T4 SERPL-MCNC: 6.6 UG/DL
TRIGL SERPL-MCNC: 68 MG/DL
TSH SERPL-ACNC: 0.9 UIU/ML
WBC # FLD AUTO: 7.82 K/UL

## 2022-05-13 ENCOUNTER — NON-APPOINTMENT (OUTPATIENT)
Age: 47
End: 2022-05-13

## 2022-06-15 DIAGNOSIS — J30.9 ALLERGIC RHINITIS, UNSPECIFIED: ICD-10-CM

## 2022-07-04 ENCOUNTER — NON-APPOINTMENT (OUTPATIENT)
Age: 47
End: 2022-07-04

## 2022-07-05 ENCOUNTER — INPATIENT (INPATIENT)
Facility: HOSPITAL | Age: 47
LOS: 0 days | Discharge: HOME | DRG: 310 | End: 2022-07-06
Attending: HOSPITALIST | Admitting: HOSPITALIST
Payer: COMMERCIAL

## 2022-07-05 VITALS
WEIGHT: 206.13 LBS | SYSTOLIC BLOOD PRESSURE: 115 MMHG | TEMPERATURE: 98 F | DIASTOLIC BLOOD PRESSURE: 80 MMHG | OXYGEN SATURATION: 99 % | HEIGHT: 69 IN | HEART RATE: 107 BPM | RESPIRATION RATE: 16 BRPM

## 2022-07-05 DIAGNOSIS — Z98.89 OTHER SPECIFIED POSTPROCEDURAL STATES: Chronic | ICD-10-CM

## 2022-07-05 DIAGNOSIS — I10 ESSENTIAL (PRIMARY) HYPERTENSION: ICD-10-CM

## 2022-07-05 DIAGNOSIS — I48.91 UNSPECIFIED ATRIAL FIBRILLATION: ICD-10-CM

## 2022-07-05 LAB
ALBUMIN SERPL ELPH-MCNC: 4.6 G/DL — SIGNIFICANT CHANGE UP (ref 3.3–5.2)
ALP SERPL-CCNC: 53 U/L — SIGNIFICANT CHANGE UP (ref 40–120)
ALT FLD-CCNC: 24 U/L — SIGNIFICANT CHANGE UP
ANION GAP SERPL CALC-SCNC: 11 MMOL/L — SIGNIFICANT CHANGE UP (ref 5–17)
APTT BLD: 25.6 SEC — LOW (ref 27.5–35.5)
AST SERPL-CCNC: 28 U/L — SIGNIFICANT CHANGE UP
BASOPHILS # BLD AUTO: 0.05 K/UL — SIGNIFICANT CHANGE UP (ref 0–0.2)
BASOPHILS NFR BLD AUTO: 0.4 % — SIGNIFICANT CHANGE UP (ref 0–2)
BILIRUB SERPL-MCNC: 0.4 MG/DL — SIGNIFICANT CHANGE UP (ref 0.4–2)
BUN SERPL-MCNC: 9.2 MG/DL — SIGNIFICANT CHANGE UP (ref 8–20)
CALCIUM SERPL-MCNC: 8.9 MG/DL — SIGNIFICANT CHANGE UP (ref 8.6–10.2)
CHLORIDE SERPL-SCNC: 104 MMOL/L — SIGNIFICANT CHANGE UP (ref 98–107)
CO2 SERPL-SCNC: 22 MMOL/L — SIGNIFICANT CHANGE UP (ref 22–29)
CREAT SERPL-MCNC: 0.49 MG/DL — LOW (ref 0.5–1.3)
D DIMER BLD IA.RAPID-MCNC: 417 NG/ML DDU — HIGH
EGFR: 116 ML/MIN/1.73M2 — SIGNIFICANT CHANGE UP
EOSINOPHIL # BLD AUTO: 0.2 K/UL — SIGNIFICANT CHANGE UP (ref 0–0.5)
EOSINOPHIL NFR BLD AUTO: 1.7 % — SIGNIFICANT CHANGE UP (ref 0–6)
GLUCOSE SERPL-MCNC: 108 MG/DL — HIGH (ref 70–99)
HCG SERPL-ACNC: <4 MIU/ML — SIGNIFICANT CHANGE UP
HCT VFR BLD CALC: 39.5 % — SIGNIFICANT CHANGE UP (ref 34.5–45)
HGB BLD-MCNC: 12.8 G/DL — SIGNIFICANT CHANGE UP (ref 11.5–15.5)
IMM GRANULOCYTES NFR BLD AUTO: 0.4 % — SIGNIFICANT CHANGE UP (ref 0–1.5)
INR BLD: 1 RATIO — SIGNIFICANT CHANGE UP (ref 0.88–1.16)
LYMPHOCYTES # BLD AUTO: 2.65 K/UL — SIGNIFICANT CHANGE UP (ref 1–3.3)
LYMPHOCYTES # BLD AUTO: 22 % — SIGNIFICANT CHANGE UP (ref 13–44)
MAGNESIUM SERPL-MCNC: 1.9 MG/DL — SIGNIFICANT CHANGE UP (ref 1.6–2.6)
MCHC RBC-ENTMCNC: 31.5 PG — SIGNIFICANT CHANGE UP (ref 27–34)
MCHC RBC-ENTMCNC: 32.4 GM/DL — SIGNIFICANT CHANGE UP (ref 32–36)
MCV RBC AUTO: 97.3 FL — SIGNIFICANT CHANGE UP (ref 80–100)
MONOCYTES # BLD AUTO: 1.05 K/UL — HIGH (ref 0–0.9)
MONOCYTES NFR BLD AUTO: 8.7 % — SIGNIFICANT CHANGE UP (ref 2–14)
NEUTROPHILS # BLD AUTO: 8.04 K/UL — HIGH (ref 1.8–7.4)
NEUTROPHILS NFR BLD AUTO: 66.8 % — SIGNIFICANT CHANGE UP (ref 43–77)
NT-PROBNP SERPL-SCNC: 1187 PG/ML — HIGH (ref 0–300)
PLATELET # BLD AUTO: 273 K/UL — SIGNIFICANT CHANGE UP (ref 150–400)
POTASSIUM SERPL-MCNC: 3.9 MMOL/L — SIGNIFICANT CHANGE UP (ref 3.5–5.3)
POTASSIUM SERPL-SCNC: 3.9 MMOL/L — SIGNIFICANT CHANGE UP (ref 3.5–5.3)
PROT SERPL-MCNC: 7.1 G/DL — SIGNIFICANT CHANGE UP (ref 6.6–8.7)
PROTHROM AB SERPL-ACNC: 11.6 SEC — SIGNIFICANT CHANGE UP (ref 10.5–13.4)
RAPID RVP RESULT: SIGNIFICANT CHANGE UP
RBC # BLD: 4.06 M/UL — SIGNIFICANT CHANGE UP (ref 3.8–5.2)
RBC # FLD: 12.6 % — SIGNIFICANT CHANGE UP (ref 10.3–14.5)
SARS-COV-2 RNA SPEC QL NAA+PROBE: SIGNIFICANT CHANGE UP
SODIUM SERPL-SCNC: 137 MMOL/L — SIGNIFICANT CHANGE UP (ref 135–145)
T4 AB SER-ACNC: 6.5 UG/DL — SIGNIFICANT CHANGE UP (ref 4.5–12)
TROPONIN T SERPL-MCNC: <0.01 NG/ML — SIGNIFICANT CHANGE UP (ref 0–0.06)
TSH SERPL-MCNC: 3.08 UIU/ML — SIGNIFICANT CHANGE UP (ref 0.27–4.2)
WBC # BLD: 12.04 K/UL — HIGH (ref 3.8–10.5)
WBC # FLD AUTO: 12.04 K/UL — HIGH (ref 3.8–10.5)

## 2022-07-05 PROCEDURE — 99291 CRITICAL CARE FIRST HOUR: CPT

## 2022-07-05 PROCEDURE — 71045 X-RAY EXAM CHEST 1 VIEW: CPT | Mod: 26

## 2022-07-05 PROCEDURE — 93010 ELECTROCARDIOGRAM REPORT: CPT

## 2022-07-05 PROCEDURE — 99223 1ST HOSP IP/OBS HIGH 75: CPT

## 2022-07-05 RX ORDER — METOPROLOL TARTRATE 50 MG
5 TABLET ORAL ONCE
Refills: 0 | Status: COMPLETED | OUTPATIENT
Start: 2022-07-05 | End: 2022-07-05

## 2022-07-05 RX ORDER — LANOLIN ALCOHOL/MO/W.PET/CERES
3 CREAM (GRAM) TOPICAL AT BEDTIME
Refills: 0 | Status: DISCONTINUED | OUTPATIENT
Start: 2022-07-05 | End: 2022-07-06

## 2022-07-05 RX ORDER — ENOXAPARIN SODIUM 100 MG/ML
100 INJECTION SUBCUTANEOUS ONCE
Refills: 0 | Status: DISCONTINUED | OUTPATIENT
Start: 2022-07-05 | End: 2022-07-05

## 2022-07-05 RX ORDER — ONDANSETRON 8 MG/1
1 TABLET, FILM COATED ORAL
Qty: 0 | Refills: 0 | DISCHARGE

## 2022-07-05 RX ORDER — HEPARIN SODIUM 5000 [USP'U]/ML
INJECTION INTRAVENOUS; SUBCUTANEOUS
Qty: 25000 | Refills: 0 | Status: DISCONTINUED | OUTPATIENT
Start: 2022-07-05 | End: 2022-07-06

## 2022-07-05 RX ORDER — HEPARIN SODIUM 5000 [USP'U]/ML
8000 INJECTION INTRAVENOUS; SUBCUTANEOUS ONCE
Refills: 0 | Status: COMPLETED | OUTPATIENT
Start: 2022-07-05 | End: 2022-07-05

## 2022-07-05 RX ORDER — DILTIAZEM HCL 120 MG
20 CAPSULE, EXT RELEASE 24 HR ORAL ONCE
Refills: 0 | Status: COMPLETED | OUTPATIENT
Start: 2022-07-05 | End: 2022-07-05

## 2022-07-05 RX ORDER — HEPARIN SODIUM 5000 [USP'U]/ML
4000 INJECTION INTRAVENOUS; SUBCUTANEOUS EVERY 6 HOURS
Refills: 0 | Status: DISCONTINUED | OUTPATIENT
Start: 2022-07-05 | End: 2022-07-06

## 2022-07-05 RX ORDER — METOPROLOL TARTRATE 50 MG
25 TABLET ORAL ONCE
Refills: 0 | Status: COMPLETED | OUTPATIENT
Start: 2022-07-05 | End: 2022-07-05

## 2022-07-05 RX ORDER — SODIUM CHLORIDE 9 MG/ML
1000 INJECTION INTRAMUSCULAR; INTRAVENOUS; SUBCUTANEOUS ONCE
Refills: 0 | Status: COMPLETED | OUTPATIENT
Start: 2022-07-05 | End: 2022-07-05

## 2022-07-05 RX ORDER — HEPARIN SODIUM 5000 [USP'U]/ML
8000 INJECTION INTRAVENOUS; SUBCUTANEOUS EVERY 6 HOURS
Refills: 0 | Status: DISCONTINUED | OUTPATIENT
Start: 2022-07-05 | End: 2022-07-06

## 2022-07-05 RX ORDER — AMLODIPINE BESYLATE 2.5 MG/1
10 TABLET ORAL DAILY
Refills: 0 | Status: DISCONTINUED | OUTPATIENT
Start: 2022-07-05 | End: 2022-07-06

## 2022-07-05 RX ORDER — ONDANSETRON 8 MG/1
4 TABLET, FILM COATED ORAL EVERY 8 HOURS
Refills: 0 | Status: DISCONTINUED | OUTPATIENT
Start: 2022-07-05 | End: 2022-07-06

## 2022-07-05 RX ORDER — OMEPRAZOLE 10 MG/1
1 CAPSULE, DELAYED RELEASE ORAL
Qty: 0 | Refills: 0 | DISCHARGE

## 2022-07-05 RX ORDER — METOPROLOL TARTRATE 50 MG
25 TABLET ORAL
Refills: 0 | Status: DISCONTINUED | OUTPATIENT
Start: 2022-07-05 | End: 2022-07-06

## 2022-07-05 RX ORDER — ACETAMINOPHEN 500 MG
650 TABLET ORAL EVERY 6 HOURS
Refills: 0 | Status: DISCONTINUED | OUTPATIENT
Start: 2022-07-05 | End: 2022-07-06

## 2022-07-05 RX ADMIN — Medication 5 MILLIGRAM(S): at 18:23

## 2022-07-05 RX ADMIN — SODIUM CHLORIDE 1000 MILLILITER(S): 9 INJECTION INTRAMUSCULAR; INTRAVENOUS; SUBCUTANEOUS at 19:54

## 2022-07-05 RX ADMIN — Medication 20 MILLIGRAM(S): at 19:26

## 2022-07-05 RX ADMIN — Medication 5 MILLIGRAM(S): at 19:06

## 2022-07-05 RX ADMIN — Medication 5 MILLIGRAM(S): at 18:44

## 2022-07-05 RX ADMIN — HEPARIN SODIUM 8000 UNIT(S): 5000 INJECTION INTRAVENOUS; SUBCUTANEOUS at 20:31

## 2022-07-05 RX ADMIN — Medication 25 MILLIGRAM(S): at 19:35

## 2022-07-05 RX ADMIN — HEPARIN SODIUM 1800 UNIT(S)/HR: 5000 INJECTION INTRAVENOUS; SUBCUTANEOUS at 20:31

## 2022-07-05 NOTE — ED ADULT TRIAGE NOTE - CHIEF COMPLAINT QUOTE
Pt with palpitations since this morning. Went to Urgent care and was found to be in new onset WILLIAM in 150's

## 2022-07-05 NOTE — CONSULT NOTE ADULT - SUBJECTIVE AND OBJECTIVE BOX
[facility]                                              CARDIOLOGY AT [place] [address]                                             Telephone: [phone]. Fax:[phone]      CARDIOLOGY CONSULTATION NOTE                                                                                             History obtained by: Patient and medical record  Community Cardiologist: None   obtained: No [  ]  Reason for Consultation: New onset A-fib    Chief complaint:   Patient is a 47y old  Female who presents with a chief complaint of New onset afib (2022 21:19)    HPI: 48 y/o female with PMHx of HTN and anemia came to the ED complaining of palpitation. Patient reported nausea and vomiting (non-bloody emesis) yesterday night which she attributed to the food she ate. She woke up this AM feeling lightheaded and palpitation, drank some Gatorade thinking it might be electrolytes imbalance from vomiting. Her symptoms persisted however necessitating ED visit. She has no fever, chills, abdominal pain, diarrhea, dysuria, chest pain, shortness of breath, cough, sick contact, recent travel.  (2022 21:19)    CARDIAC TESTING     TTE Echo Complete w/ Contrast w/ Doppler (20 @ 10:13) >  Summary:   1. Left ventricular ejection fraction, by visual estimation, is 55 to 60%.   2. Normal global left ventricular systolic function.   3. Mildly increased LV wall thickness.   4. There is no evidence of pericardial effusion.   5. Endocardial visualization was enhanced with intravenous echo contrast.  MD Christiano Electronically signed on 3/18/2020 at 6:46:46 PM  < end of copied text >    PAST MEDICAL HISTORY  Hypertension  Anemia  Obesity    PAST SURGICAL HISTORY  S/P   S/P tonsillectomy  S/P gastric surgery    SOCIAL HISTORY: + social alcohol use. Denies smoking/drugs    FAMILY HISTORY:  Family history of hypertension (Sibling)  Family history of CVA  grandfather age 65  Family history of sinus bradycardia (mother has pacemaker)    Family History of Cardiovascular Disease:  Yes   Coronary Artery Disease in first degree relative: No [  ]  Sudden Cardiac Death in First degree relative: No [  ]    HOME MEDICATIONS:   amLODIPine 10 mg oral tablet: 1 tab(s) orally once a day crush and put in low fat yogurt or pudding.  (2020 15:13)  metoprolol: 25 milligram(s) orally once a day crush and put in low fat yogurt or pudding.  (2020 15:13)    CURRENT CARDIAC MEDICATIONS:   amLODIPine   Tablet 10 milliGRAM(s) Oral daily  metoprolol tartrate 25 milliGRAM(s) Oral two times a day    CURRENT OTHER MEDICATIONS:   acetaminophen     Tablet .. 650 milliGRAM(s) Oral every 6 hours PRN Temp greater or equal to 38C (100.4F), Mild Pain (1 - 3)  melatonin 3 milliGRAM(s) Oral at bedtime PRN Insomnia  ondansetron Injectable 4 milliGRAM(s) IV Push every 8 hours PRN Nausea and/or Vomiting  aluminum hydroxide/magnesium hydroxide/simethicone Suspension 30 milliLiter(s) Oral every 4 hours PRN Dyspepsia  heparin   Injectable 8000 Unit(s) IV Push every 6 hours PRN For aPTT less than 40  heparin   Injectable 4000 Unit(s) IV Push every 6 hours PRN For aPTT between 40 - 57  heparin  Infusion.  Unit(s)/Hr (18 mL/Hr) IV Continuous <Continuous>    ALLERGIES:   sulfa drugs (Rash)    REVIEW OF SYMPTOMS:   CONSTITUTIONAL: No fever, no chills, no weight loss, no weight gain, no fatigue   ENMT:  No vertigo; No sinus or throat pain  NECK: No pain or stiffness  CARDIOVASCULAR: No chest pain, no dyspnea, no syncope/presyncope, no fatigue, no palpitations (resolved), no dizziness (resolved), no Orthopnea, no Paroxsymal nocturnal dyspnea  RESPIRATORY: No shortness of breath, no cough, no wheezing  : No dysuria, no hematuria   GI: no nausea, no diarrhea, no constipation, no abdominal pain  NEURO: No headache, no slurred speech   MUSCULOSKELETAL: No joint pain or swelling; No muscle, back, or extremity pain  PSYCH: No agitation, no anxiety.    ALL OTHER REVIEW OF SYSTEMS ARE NEGATIVE.    VITAL SIGNS:   T(C): 37.1 (22 @ 23:17), Max: 37.1 (22 @ 23:17)  T(F): 98.8 (22 @ 23:17), Max: 98.8 (22 @ 23:17)  HR: 74 (22 @ :17) (74 - 160)  BP: 122/80 (22 @ 23:17) (96/56 - 133/83)  RR: 18 (22 @ 23:17) (16 - 21)  SpO2: 99% (22 @ 23:17) (98% - 100%)    PHYSICAL EXAM:   Constitutional: Comfortable . No acute distress.   HEENT: Atraumatic and normocephalic , neck is supple . no JVD.   CNS: A&Ox3. No focal deficits.   Respiratory: CTAB, unlabored. No wheezing, No crackles or rhonchi   Cardiovascular: RRR normal s1 s2. No murmur. No rubs or gallop.  Gastrointestinal: Soft, non-tender. +Bowel sounds.   Extremities: 2+ Peripheral Pulses, No edema  Psychiatric: Calm . no agitation.   Skin: Warm and dry    LABS:   ( 2022 18:35 )  Troponin T  <0.01,  CPK  X    , CKMB  X    , BNP 1187<H>                          12.8   12.04 )-----------( 273      ( 2022 18:35 )             39.5     07-    137  |  104  |  9.2  ----------------------------<  108<H>  3.9   |  22.0  |  0.49<L>    Ca    8.9      2022 18:35  Mg     1.9     07-    TPro  7.1  /  Alb  4.6  /  TBili  0.4  /  DBili  x   /  AST  28  /  ALT  24  /  AlkPhos  53      PT/INR - ( 2022 19:33 )   PT: 11.6 sec;   INR: 1.00 ratio      PTT - ( 2022 19:33 )  PTT:25.6 sec    Thyroid Stimulating Hormone, Serum: 3.08 uIU/mL (22 @ 18:35)    ECG:   Prior ECG: Yes    RADIOLOGY & ADDITIONAL STUDIES:     Xray Chest 1 View- PORTABLE-Urgent (22 @ 18:53) >  FINDINGS:  Single frontal view of the chest demonstrates the lungs to be clear. The   cardiomediastinal silhouette is enlarged. No acute osseous abnormalities.   Overlying EKG leads and wires are noted  IMPRESSION: No acute cardiopulmonary disease process. Cardiomegaly.  < end of copied text >    Preliminary evaluation, please await official recommendations     Assessment and recommendations are final when note is signed by the attending.                                      [facility]                                              CARDIOLOGY AT [place] [address]                                             Telephone: [phone]. Fax:[phone]      CARDIOLOGY CONSULTATION NOTE                                                                                             History obtained by: Patient and medical record  Community Cardiologist: None   obtained: No [  ]  Reason for Consultation: New onset A-fib    Chief complaint:   Patient is a 47y old  Female who presents with a chief complaint of New onset afib (2022 21:19)    HPI: 48 y/o female with PMHx of HTN and anemia came to the ED complaining of palpitation. Patient reported nausea and vomiting (non-bloody emesis) yesterday night which she attributed to the food she ate. She woke up this AM feeling lightheaded and palpitation, drank some Gatorade thinking it might be electrolytes imbalance from vomiting. Her symptoms persisted however necessitating ED visit. She has no fever, chills, abdominal pain, diarrhea, dysuria, chest pain, shortness of breath, cough, sick contact, recent travel.  (2022 21:19)    CARDIAC TESTING     TTE Echo Complete w/ Contrast w/ Doppler (20 @ 10:13) >  Summary:   1. Left ventricular ejection fraction, by visual estimation, is 55 to 60%.   2. Normal global left ventricular systolic function.   3. Mildly increased LV wall thickness.   4. There is no evidence of pericardial effusion.   5. Endocardial visualization was enhanced with intravenous echo contrast.  MD Christiano Electronically signed on 3/18/2020 at 6:46:46 PM  < end of copied text >    PAST MEDICAL HISTORY  Hypertension  Anemia  Obesity    PAST SURGICAL HISTORY  S/P   S/P tonsillectomy  S/P gastric surgery    SOCIAL HISTORY: + social alcohol use. Denies smoking/drugs    FAMILY HISTORY:  Family history of hypertension (Sibling)  Family history of CVA  grandfather age 65  Family history of sinus bradycardia (mother has pacemaker)    Family History of Cardiovascular Disease:  Yes   Coronary Artery Disease in first degree relative: No [  ]  Sudden Cardiac Death in First degree relative: No [  ]    HOME MEDICATIONS:   amLODIPine 10 mg oral tablet: 1 tab(s) orally once a day crush and put in low fat yogurt or pudding.  (2020 15:13)  metoprolol: 25 milligram(s) orally once a day crush and put in low fat yogurt or pudding.  (2020 15:13)    CURRENT CARDIAC MEDICATIONS:   amLODIPine   Tablet 10 milliGRAM(s) Oral daily  metoprolol tartrate 25 milliGRAM(s) Oral two times a day    CURRENT OTHER MEDICATIONS:   acetaminophen     Tablet .. 650 milliGRAM(s) Oral every 6 hours PRN Temp greater or equal to 38C (100.4F), Mild Pain (1 - 3)  melatonin 3 milliGRAM(s) Oral at bedtime PRN Insomnia  ondansetron Injectable 4 milliGRAM(s) IV Push every 8 hours PRN Nausea and/or Vomiting  aluminum hydroxide/magnesium hydroxide/simethicone Suspension 30 milliLiter(s) Oral every 4 hours PRN Dyspepsia  heparin   Injectable 8000 Unit(s) IV Push every 6 hours PRN For aPTT less than 40  heparin   Injectable 4000 Unit(s) IV Push every 6 hours PRN For aPTT between 40 - 57  heparin  Infusion.  Unit(s)/Hr (18 mL/Hr) IV Continuous <Continuous>    ALLERGIES:   sulfa drugs (Rash)    REVIEW OF SYMPTOMS:   CONSTITUTIONAL: No fever, no chills, no weight loss, no weight gain, no fatigue   ENMT:  No vertigo; No sinus or throat pain  NECK: No pain or stiffness  CARDIOVASCULAR: No chest pain, no dyspnea, no syncope/presyncope, no fatigue, no palpitations (resolved), no dizziness (resolved), no Orthopnea, no Paroxsymal nocturnal dyspnea  RESPIRATORY: No shortness of breath, no cough, no wheezing  : No dysuria, no hematuria   GI: no nausea, no diarrhea, no constipation, no abdominal pain  NEURO: No headache, no slurred speech   MUSCULOSKELETAL: No joint pain or swelling; No muscle, back, or extremity pain  PSYCH: No agitation, no anxiety.    ALL OTHER REVIEW OF SYSTEMS ARE NEGATIVE.    VITAL SIGNS:   T(C): 37.1 (22 @ 23:17), Max: 37.1 (22 @ 23:17)  T(F): 98.8 (22 @ 23:17), Max: 98.8 (22 @ 23:17)  HR: 74 (22 @ :17) (74 - 160)  BP: 122/80 (22 @ 23:17) (96/56 - 133/83)  RR: 18 (22 @ 23:17) (16 - 21)  SpO2: 99% (22 @ 23:17) (98% - 100%)    PHYSICAL EXAM:   Constitutional: Comfortable . No acute distress.   HEENT: Atraumatic and normocephalic , neck is supple . no JVD.   CNS: A&Ox3. No focal deficits.   Respiratory: CTAB, unlabored. No wheezing, No crackles or rhonchi   Cardiovascular: RRR normal s1 s2. No murmur. No rubs or gallop.  Gastrointestinal: Soft, non-tender. +Bowel sounds.   Extremities: 2+ Peripheral Pulses, No edema  Psychiatric: Calm . no agitation.   Skin: Warm and dry    LABS:   ( 2022 18:35 )  Troponin T  <0.01,  CPK  X    , CKMB  X    , BNP 1187<H>                          12.8   12.04 )-----------( 273      ( 2022 18:35 )             39.5     07-    137  |  104  |  9.2  ----------------------------<  108<H>  3.9   |  22.0  |  0.49<L>    Ca    8.9      2022 18:35  Mg     1.9     -    TPro  7.1  /  Alb  4.6  /  TBili  0.4  /  DBili  x   /  AST  28  /  ALT  24  /  AlkPhos  53  07-    PT/INR - ( 2022 19:33 )   PT: 11.6 sec;   INR: 1.00 ratio      PTT - ( 2022 19:33 )  PTT:25.6 sec    Thyroid Stimulating Hormone, Serum: 3.08 uIU/mL (22 @ 18:35)    ECG: A-fib with RVR  Prior ECG: Yes    RADIOLOGY & ADDITIONAL STUDIES:     Xray Chest 1 View- PORTABLE-Urgent (22 @ 18:53) >  FINDINGS:  Single frontal view of the chest demonstrates the lungs to be clear. The   cardiomediastinal silhouette is enlarged. No acute osseous abnormalities.   Overlying EKG leads and wires are noted  IMPRESSION: No acute cardiopulmonary disease process. Cardiomegaly.  < end of copied text >    Preliminary evaluation, please await official recommendations     Assessment and recommendations are final when note is signed by the attending.

## 2022-07-05 NOTE — CONSULT NOTE ADULT - PROBLEM SELECTOR RECOMMENDATION 2
Well controlled  Continue home meds with strict parameters  Monitor BP  DASH diet    Further recommendations base on above findings Well controlled  Continue home meds with strict parameters  Monitor BP  DASH diet    Further recommendations base on above findings  Case discussed with Dr Oliveros

## 2022-07-05 NOTE — ED PROVIDER NOTE - OBJECTIVE STATEMENT
Patient is a 46 yo female with PMHx HTN on metoprolol presenting with palpitations. Patient states she had a few episodes of nonbilious nonbloody vomiting last night with some intermittent lightheadedness which she attributes to fast food she ate last night; however patient woke up this AM with palpitations. Patient found to be in a fib with RVR upon arrival, complaining of palpitations; denies syncope, falls, CP, SOB, peripheral edema, recent travel, sick contacts. Denies any cardiac history, FH cardiac disease, denies smoking and drinks alcohol occasionally. Denies fevers, chills, dizziness, dysphagia, dysarthria, diplopia, photophobia, syncope, cough, congestion, SOB, CP, abdominal pain, neck pain, back pain, diarrhea, dysuria, hematuria, hematochezia, hematemesis, recent travel, sick contacts, leg swelling.

## 2022-07-05 NOTE — CONSULT NOTE ADULT - NS ATTEND AMEND GEN_ALL_CORE FT
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 Patient reported nausea and vomiting (non-bloody emesis) yesterday night which she attributed to the food she ate. She woke up this AM feeling lightheaded and palpitation, drank some Gatorade thinking it might be electrolytes imbalance from vomiting. Her symptoms persisted however necessitating ED visit. She has no fever, chills, abdominal pain, diarrhea, dysuria, chest pain, shortness of breath, cough, sick contact, recent travel.  (05 Jul 2022 21:19)    Patient has gastric sleeve. On Monday evening she had vomiting.  She woke up with racing heart on 7/5/2022. patient had some dizziness when she walked around. No chest pain or dyspnea.    Patient has no DM. No prior TIA or CVA. Non smoker. No ETOH or Substance abuse  + HTN        VITALS:   T(C): 37 (07-06-22 @ 11:19), Max: 37.1 (07-05-22 @ 23:17)  HR: 72 (07-06-22 @ 11:19) (68 - 160)  BP: 110/65 (07-06-22 @ 11:19) (96/56 - 133/83)  RR: 18 (07-06-22 @ 11:19) (16 - 21)  SpO2: 100% (07-06-22 @ 11:19) (97% - 100%)    Chaperone: Noemi Saxena RN.  Patients  at bedside  GENERAL: NAD, lying in bed comfortably  HEAD:  Atraumatic, Normocephalic  EYES: EOMI, PERRLA, conjunctiva and sclera clear  ENT: Moist mucous membranes  NECK: Supple, No JVD  CHEST/LUNG: Clear to auscultation bilaterally; No rales, rhonchi, wheezing, or rubs. Unlabored respirations  HEART: Regular rate and rhythm; No murmurs, rubs, or gallops  ABDOMEN: BSx4; Soft, nontender, nondistended  EXTREMITIES:  2+ Peripheral Pulses, brisk capillary refill. No clubbing, cyanosis, or edema  NERVOUS SYSTEM:  A&Ox3, no focal deficits   SKIN: No rashes or lesions    Monitor- NSR.  TSH normal  Chest CT negative for PE    Assessment:  1. PAF with CHADSVasc score 2  2. HTN    Recommendations:    Patient educated about PAF management, risks and benefits of AC, verbalized her understanding    1. D/C Heparin  2. Start Eliquis 5 mg BID  3. Lopressor 25 mg BID  4. ECHO today  5. If echo is normal may d/c home    Patient is advised to f/u with me in the office for outpatient stress test and f/u    Will sign off

## 2022-07-05 NOTE — CONSULT NOTE ADULT - PROBLEM SELECTOR RECOMMENDATION 9
Telemonitor  HR controlled. Now pt is NSR  May use Metoprolol 5 mg IVP for sustained HR >120   Start Metoprolol 25 mg BID with strict parameters   [id number] VASc: 2 points (Female, HTN)  Stroke risk was 2.2% and 2.9% risk of stroke/TIA/systemic embolism.  Start full AC with Heparin. Monitor coag panel and daily CBC  Trend CE. Trend trops x 3 q6. Serial EKGs  Replace all electrolytes. Maintain K+~4 and mag~2  A1C, lipid panel fasting, TFTs, sed rate to ro comorbidities  Echo to assess structural and functional status Telemonitor  Keep NPO x now  HR controlled. Now pt is NSR  May use Metoprolol 5 mg IVP for sustained HR >120   Start Metoprolol 25 mg q6 with strict parameters   [id number] VASc: 2 points (Female, HTN)  Stroke risk was 2.2% and 2.9% risk of stroke/TIA/systemic embolism.  Start full AC with Heparin. Monitor coag panel and daily CBC  Trend CE. Trend trops x 3 q6. Serial EKGs  Replace all electrolytes. Maintain K+~4 and mag~2  A1C, lipid panel fasting, TFTs, sed rate to ro comorbidities  Echo to assess structural and functional status Pt NSR on monitor with HR controlled. Needs new EKG to confirm. Keep NPO x now  Pt asymptomatic at this time  Telemonitor  May use Metoprolol 5 mg IVP for sustained HR >120   Start Metoprolol 25 mg q6 with strict parameters   [id number] VASc: 2 points (Female, HTN) Stroke risk was 2.2% and 2.9% risk of stroke/TIA/systemic embolism.  Start full AC with Heparin. Monitor coag panel and daily CBC  Trend CE. Trend trops x 3 q6. Serial EKGs  Replace all electrolytes. Maintain K+~4 and mag~2  A1C, lipid panel fasting, TFTs, sed rate to ro comorbidities  Echo to assess structural and functional status

## 2022-07-05 NOTE — H&P ADULT - NSHPPHYSICALEXAM_GEN_ALL_CORE
Vital Signs Last 24 Hrs  T(C): 36.9 (05 Jul 2022 17:54), Max: 36.9 (05 Jul 2022 17:54)  T(F): 98.5 (05 Jul 2022 17:54), Max: 98.5 (05 Jul 2022 17:54)  HR: 75 (05 Jul 2022 20:26) (75 - 160)  BP: 127/- (05 Jul 2022 20:26) (96/56 - 133/83)  BP(mean): --  RR: 19 (05 Jul 2022 20:26) (16 - 21)  SpO2: 100% (05 Jul 2022 20:26) (98% - 100%)

## 2022-07-05 NOTE — ED ADULT NURSE NOTE - OBJECTIVE STATEMENT
Pt  A&OX3, amb ad anuradha, c/o palpitations since this AM, no sob/CP.  Pt in 160's when she arrived to critical care.  Pt denies any recent illness.  Pt in new onset afib.  Abd soft nondistended, nontender, moving all ext well.

## 2022-07-05 NOTE — ED PROVIDER NOTE - PROGRESS NOTE DETAILS
SUJIT-  patient HR now in the 70s with stable BP, satting 99% on RA s/p Lopressor IV x 3 and Diltiazem IV. Reports improvement in her lightheadedness and palpitations. VSS, resting comfortably. Cardiology consulted - as per Dr. Oliveros patient should be admitted for TTE and given oral rate control at this time. Patient ready and agreeable to admission for further monitoring and echo. D-dimer elevated, CTA pending, Heparin given for Anticoagulation. Currently stable.

## 2022-07-05 NOTE — ED ADULT TRIAGE NOTE - HEIGHT IN FEET
Please schedule video or telephone follow up visit with Dilia MARTINEZ, with Dr. Oro staffing virtually.    Thank you.   5

## 2022-07-05 NOTE — ED PROVIDER NOTE - ATTENDING CONTRIBUTION TO CARE
Boaz: I performed a face to face evaluation of this patient and performed a full history and physical examination on the patient.  I agree with the resident's history, physical examination, and plan of the patient unless otherwise noted. My brief assessment is as follows: hx htn c/o episode of nausea/vomiting last night around 7pm and starting to feel unwell with palpitations and dizziness/lightheadedness today. no hx afib/arrhythmia. no a/c. no f/c, no sob, no abd pain, no other complaints. doesn't have cardiologist. non toxic, tachy/irregular ~160-185, ctab, nl resp rate and effort, abd benign, no swelling bl/ LE. neuro intact. nl skin. afib rvr on ekg, rate control, fluids, cards and admission.

## 2022-07-05 NOTE — ED PROVIDER NOTE - PHYSICAL EXAMINATION
Constitutional: Uncomfortable appearing, awake, alert, oriented to person, place, and time/situation and in no apparent distress  ENMT: Airway patent nasal mucosa clear. Mouth with normal mucosa. Throat has no vesicles no oropharyngeal exudates and uvula is midline. No blood in the oropharynx  EYES: clear bilaterally, pupils equal, round and reactive to light  Cardiac: Tachycardic, irregular rhythm. Heart sounds S1, S2. No murmurs, rubs or gallops. Good capillary refill, 2+ pulses, no peripheral edema  Respiratory: Lungs CTAB, no use of accessory muscles, no crackles, satting 99% on RA in no distress  Gastrointestinal: Abdomen nondistended, non-tender, no rebound guarding or peritoneal signs  Genitourinary: No CVA tenderness, pelvis nontender, bladder nondistended  Musculoskeletal: Spine appears normal, range of motion is not limited, no muscle or joint tenderness  Neurological: Alert and oriented, no focal deficits, no motor or sensory deficits. CN 2-12 intact, PERRLA, EOMI, No FND, moving all extremities equally, sensation intact, No dysmetria, no ataxia, negative heel-shin, 5/5 strength   Skin: Skin normal color for race, warm, dry and intact, No evidence of rash

## 2022-07-05 NOTE — ED PROVIDER NOTE - CLINICAL SUMMARY MEDICAL DECISION MAKING FREE TEXT BOX
Patient is a 46 yo female with PMHx HTN on metoprolol presenting with palpitations. Patient states she had a few episodes of nonbilious nonbloody vomiting last night with some intermittent lightheadedness which she attributes to fast food she ate last night; however patient woke up this AM with palpitations. Patient found to be in a fib with RVR upon arrival, complaining of palpitations; Vitals otherwise stable, lungs ctab, belly soft nontender, 2+ peripheral pulses. Will rate control, obtains labs and TSH d-dimer to r/o thyroid abnormalities and PE, troponin to r/o ACS, CXR to r/o PNA. Will consult cardiology and continue to monitor.

## 2022-07-05 NOTE — H&P ADULT - ASSESSMENT
48 y/o female with PMH of HTN came to the ED complaining of palpitation. Patient reported nausea and vomiting (non-bloody emesis) yesterday night which she attributed to the food she ate. In the ED, found to be in afib with rapid ventricular response. Cardiology consulted. D-dimer elevated, CT chest with angio pending. Mild leukocytosis likely reactive. CXR no acute finding.     New onset afib   Admit to telemetry   Troponin x 1 negative   BNP: 1187  S/p Diltiazem and Metoprolol in the ED  Back in sinus on my evaluation   Will continue Metoprolol 25mg bid with holding parameters   Echo ordered   Started on Heparin drip in the ED as per cardiology rec   Cardiology to see in AM     HTN  Patient takes Metoprolol tartrate 25mg daily, will give bid with holding parameters  Amlodipine 10mg     Supportive   DVT prophylaxis: on Heparin   Diet: NPO after midnight     Plan of care discussed with patient

## 2022-07-05 NOTE — H&P ADULT - HISTORY OF PRESENT ILLNESS
46 y/o female with PMH of HTN came to the ED complaining of palpitation. Patient reported nausea and vomiting (non-bloody emesis) yesterday night which she attributed to the food she ate. She woke up this AM feeling lightheaded and palpitation, drank some Gatorade thinking it might be electrolytes imbalance from vomiting. Her symptoms persisted however necessitating ED visit. She has no fever, chills, abdominal pain, diarrhea, dysuria, chest pain, shortness of breath, cough, sick contact, recent travel.

## 2022-07-05 NOTE — ED ADULT NURSE REASSESSMENT NOTE - NS ED NURSE REASSESS COMMENT FT1
recvd pt from day RN, Pt in no apparent distress at this time. Airway patent, breathing spontaneous and nonlabored. Pt A&Ox3 resting in stretcher. Pt c/o feeling heart palpitation. pt mid afib rvr 150's on monitor during assessment,.

## 2022-07-05 NOTE — ED ADULT NURSE NOTE - NSIMPLEMENTINTERV_GEN_ALL_ED
Implemented All Universal Safety Interventions:  [place] to call system. Call bell, personal items and telephone within reach. Instruct patient to call for assistance. Room bathroom lighting operational. Non-slip footwear when patient is off stretcher. Physically safe environment: no spills, clutter or unnecessary equipment. Stretcher in lowest position, wheels locked, appropriate side rails in place.

## 2022-07-05 NOTE — ED ADULT NURSE NOTE - MUSCULOSKELETAL ASSESSMENT
[FreeTextEntry1] : scar [FreeTextEntry2] : 1542 laser  [FreeTextEntry6] : After numbing skin surface with lidocaine cream the 1540 XD laser was selected and treatment with 6 passes completed on hypertrophic neck and shoulder scars.  pt tolerated the procedure well there were not complications and erythema was mild with no blistering\par  - - -

## 2022-07-05 NOTE — H&P ADULT - MUSCULOSKELETAL
ROM intact/no joint swelling/no joint erythema/no joint warmth/no calf tenderness/strength 5/5 bilateral upper extremities/strength 5/5 bilateral lower extremities

## 2022-07-06 ENCOUNTER — TRANSCRIPTION ENCOUNTER (OUTPATIENT)
Age: 47
End: 2022-07-06

## 2022-07-06 VITALS
HEART RATE: 74 BPM | RESPIRATION RATE: 18 BRPM | OXYGEN SATURATION: 99 % | DIASTOLIC BLOOD PRESSURE: 78 MMHG | SYSTOLIC BLOOD PRESSURE: 124 MMHG | TEMPERATURE: 98 F

## 2022-07-06 DIAGNOSIS — I48.91 UNSPECIFIED ATRIAL FIBRILLATION: ICD-10-CM

## 2022-07-06 LAB
A1C WITH ESTIMATED AVERAGE GLUCOSE RESULT: 4.3 % — SIGNIFICANT CHANGE UP (ref 4–5.6)
ANION GAP SERPL CALC-SCNC: 9 MMOL/L — SIGNIFICANT CHANGE UP (ref 5–17)
APTT BLD: 39.6 SEC — HIGH (ref 27.5–35.5)
APTT BLD: >200 SEC — CRITICAL HIGH (ref 27.5–35.5)
BUN SERPL-MCNC: 8.5 MG/DL — SIGNIFICANT CHANGE UP (ref 8–20)
CALCIUM SERPL-MCNC: 8.4 MG/DL — LOW (ref 8.6–10.2)
CHLORIDE SERPL-SCNC: 108 MMOL/L — HIGH (ref 98–107)
CHOLEST SERPL-MCNC: 134 MG/DL — SIGNIFICANT CHANGE UP
CO2 SERPL-SCNC: 24 MMOL/L — SIGNIFICANT CHANGE UP (ref 22–29)
CREAT SERPL-MCNC: 0.48 MG/DL — LOW (ref 0.5–1.3)
EGFR: 117 ML/MIN/1.73M2 — SIGNIFICANT CHANGE UP
ESTIMATED AVERAGE GLUCOSE: 77 MG/DL — SIGNIFICANT CHANGE UP (ref 68–114)
GLUCOSE SERPL-MCNC: 88 MG/DL — SIGNIFICANT CHANGE UP (ref 70–99)
HCT VFR BLD CALC: 34.6 % — SIGNIFICANT CHANGE UP (ref 34.5–45)
HDLC SERPL-MCNC: 50 MG/DL — LOW
HGB BLD-MCNC: 11.3 G/DL — LOW (ref 11.5–15.5)
LIPID PNL WITH DIRECT LDL SERPL: 67 MG/DL — SIGNIFICANT CHANGE UP
MCHC RBC-ENTMCNC: 32.7 GM/DL — SIGNIFICANT CHANGE UP (ref 32–36)
MCHC RBC-ENTMCNC: 32.9 PG — SIGNIFICANT CHANGE UP (ref 27–34)
MCV RBC AUTO: 100.9 FL — HIGH (ref 80–100)
NON HDL CHOLESTEROL: 84 MG/DL — SIGNIFICANT CHANGE UP
PLATELET # BLD AUTO: 241 K/UL — SIGNIFICANT CHANGE UP (ref 150–400)
POTASSIUM SERPL-MCNC: 3.6 MMOL/L — SIGNIFICANT CHANGE UP (ref 3.5–5.3)
POTASSIUM SERPL-SCNC: 3.6 MMOL/L — SIGNIFICANT CHANGE UP (ref 3.5–5.3)
RBC # BLD: 3.43 M/UL — LOW (ref 3.8–5.2)
RBC # FLD: 12.9 % — SIGNIFICANT CHANGE UP (ref 10.3–14.5)
SODIUM SERPL-SCNC: 141 MMOL/L — SIGNIFICANT CHANGE UP (ref 135–145)
TRIGL SERPL-MCNC: 84 MG/DL — SIGNIFICANT CHANGE UP
TROPONIN T SERPL-MCNC: <0.01 NG/ML — SIGNIFICANT CHANGE UP (ref 0–0.06)
WBC # BLD: 10.5 K/UL — SIGNIFICANT CHANGE UP (ref 3.8–10.5)
WBC # FLD AUTO: 10.5 K/UL — SIGNIFICANT CHANGE UP (ref 3.8–10.5)

## 2022-07-06 PROCEDURE — 85379 FIBRIN DEGRADATION QUANT: CPT

## 2022-07-06 PROCEDURE — 96375 TX/PRO/DX INJ NEW DRUG ADDON: CPT

## 2022-07-06 PROCEDURE — 85730 THROMBOPLASTIN TIME PARTIAL: CPT

## 2022-07-06 PROCEDURE — 80061 LIPID PANEL: CPT

## 2022-07-06 PROCEDURE — 83880 ASSAY OF NATRIURETIC PEPTIDE: CPT

## 2022-07-06 PROCEDURE — 99239 HOSP IP/OBS DSCHRG MGMT >30: CPT

## 2022-07-06 PROCEDURE — 96374 THER/PROPH/DIAG INJ IV PUSH: CPT

## 2022-07-06 PROCEDURE — 84484 ASSAY OF TROPONIN QUANT: CPT

## 2022-07-06 PROCEDURE — 85610 PROTHROMBIN TIME: CPT

## 2022-07-06 PROCEDURE — 96376 TX/PRO/DX INJ SAME DRUG ADON: CPT

## 2022-07-06 PROCEDURE — 83735 ASSAY OF MAGNESIUM: CPT

## 2022-07-06 PROCEDURE — 84443 ASSAY THYROID STIM HORMONE: CPT

## 2022-07-06 PROCEDURE — 85027 COMPLETE CBC AUTOMATED: CPT

## 2022-07-06 PROCEDURE — 80053 COMPREHEN METABOLIC PANEL: CPT

## 2022-07-06 PROCEDURE — 71275 CT ANGIOGRAPHY CHEST: CPT | Mod: MA

## 2022-07-06 PROCEDURE — 80048 BASIC METABOLIC PNL TOTAL CA: CPT

## 2022-07-06 PROCEDURE — C8929: CPT

## 2022-07-06 PROCEDURE — 71045 X-RAY EXAM CHEST 1 VIEW: CPT

## 2022-07-06 PROCEDURE — 99291 CRITICAL CARE FIRST HOUR: CPT | Mod: 25

## 2022-07-06 PROCEDURE — 99222 1ST HOSP IP/OBS MODERATE 55: CPT

## 2022-07-06 PROCEDURE — 84702 CHORIONIC GONADOTROPIN TEST: CPT

## 2022-07-06 PROCEDURE — 84436 ASSAY OF TOTAL THYROXINE: CPT

## 2022-07-06 PROCEDURE — 36415 COLL VENOUS BLD VENIPUNCTURE: CPT

## 2022-07-06 PROCEDURE — 0225U NFCT DS DNA&RNA 21 SARSCOV2: CPT

## 2022-07-06 PROCEDURE — 85025 COMPLETE CBC W/AUTO DIFF WBC: CPT

## 2022-07-06 PROCEDURE — 93005 ELECTROCARDIOGRAM TRACING: CPT

## 2022-07-06 PROCEDURE — 83036 HEMOGLOBIN GLYCOSYLATED A1C: CPT

## 2022-07-06 PROCEDURE — 71275 CT ANGIOGRAPHY CHEST: CPT | Mod: 26

## 2022-07-06 RX ORDER — METOPROLOL TARTRATE 50 MG
1 TABLET ORAL
Qty: 60 | Refills: 0
Start: 2022-07-06

## 2022-07-06 RX ORDER — APIXABAN 2.5 MG/1
1 TABLET, FILM COATED ORAL
Qty: 60 | Refills: 0
Start: 2022-07-06

## 2022-07-06 RX ORDER — POTASSIUM CHLORIDE 20 MEQ
40 PACKET (EA) ORAL ONCE
Refills: 0 | Status: COMPLETED | OUTPATIENT
Start: 2022-07-06 | End: 2022-07-06

## 2022-07-06 RX ORDER — METOPROLOL TARTRATE 50 MG
25 TABLET ORAL
Qty: 0 | Refills: 0 | DISCHARGE

## 2022-07-06 RX ORDER — APIXABAN 2.5 MG/1
5 TABLET, FILM COATED ORAL EVERY 12 HOURS
Refills: 0 | Status: DISCONTINUED | OUTPATIENT
Start: 2022-07-06 | End: 2022-07-06

## 2022-07-06 RX ADMIN — Medication 40 MILLIEQUIVALENT(S): at 14:07

## 2022-07-06 RX ADMIN — APIXABAN 5 MILLIGRAM(S): 2.5 TABLET, FILM COATED ORAL at 14:22

## 2022-07-06 RX ADMIN — HEPARIN SODIUM 1500 UNIT(S)/HR: 5000 INJECTION INTRAVENOUS; SUBCUTANEOUS at 06:24

## 2022-07-06 RX ADMIN — AMLODIPINE BESYLATE 10 MILLIGRAM(S): 2.5 TABLET ORAL at 06:55

## 2022-07-06 RX ADMIN — Medication 25 MILLIGRAM(S): at 06:54

## 2022-07-06 RX ADMIN — HEPARIN SODIUM 0 UNIT(S)/HR: 5000 INJECTION INTRAVENOUS; SUBCUTANEOUS at 05:15

## 2022-07-06 NOTE — DISCHARGE NOTE PROVIDER - NSDCMRMEDTOKEN_GEN_ALL_CORE_FT
amLODIPine 10 mg oral tablet: 1 tab(s) orally once a day crush and put in low fat yogurt or pudding.   apixaban 5 mg oral tablet: 1 tab(s) orally every 12 hours  metoprolol tartrate 25 mg oral tablet: 1 tab(s) orally 2 times a day

## 2022-07-06 NOTE — PROGRESS NOTE ADULT - ASSESSMENT
46 y/o female with PMH of HTN came to the ED complaining of palpitation. Patient reported nausea and vomiting (non-bloody emesis) yesterday night which she attributed to the food she ate. In the ED, found to be in afib with rapid ventricular response. Cardiology consulted. D-dimer elevated, CT chest with angio pending. Mild leukocytosis likely reactive. CXR no acute finding.     New onset afib   Admit to telemetry   Troponin x 2 negative   BNP: 1187  S/p Diltiazem and Metoprolol in the ED  Back in sinus since   Metoprolol 25mg bid   Echo pending  Started on Heparin drip in the ED as per cardiology rec   switched to Eliquis as per cardio recs    HTN  Patient takes Metoprolol tartrate 25mg daily, will give bid with holding parameters  Amlodipine 10mg     DISPO: dc home today if echo unremarkable  d/w Dr. Oliveros.    Plan of care discussed with patient   spent 36 mins

## 2022-07-06 NOTE — PROGRESS NOTE ADULT - SUBJECTIVE AND OBJECTIVE BOX
Patient is a 47y old  Female who presents with a chief complaint of New onset afib (06 Jul 2022 12:39)      INTERVAL History of Present Illness/OVERNIGHT EVENTS: spontaneous conversion to sinus rhythm  no CP or SOB or exertional symptoms.  agreeable to start Eliquis    MEDICATIONS  (STANDING):  amLODIPine   Tablet 10 milliGRAM(s) Oral daily  apixaban 5 milliGRAM(s) Oral every 12 hours  metoprolol tartrate 25 milliGRAM(s) Oral two times a day  potassium chloride   Powder 40 milliEquivalent(s) Oral once    MEDICATIONS  (PRN):  acetaminophen     Tablet .. 650 milliGRAM(s) Oral every 6 hours PRN Temp greater or equal to 38C (100.4F), Mild Pain (1 - 3)  aluminum hydroxide/magnesium hydroxide/simethicone Suspension 30 milliLiter(s) Oral every 4 hours PRN Dyspepsia  melatonin 3 milliGRAM(s) Oral at bedtime PRN Insomnia  ondansetron Injectable 4 milliGRAM(s) IV Push every 8 hours PRN Nausea and/or Vomiting      Allergies    sulfa drugs (Rash)    Intolerances        REVIEW OF SYSTEMS:  Negative unless otherwise specified above.    Vital Signs Last 24 Hrs  T(C): 37 (06 Jul 2022 11:19), Max: 37.1 (05 Jul 2022 23:17)  T(F): 98.6 (06 Jul 2022 11:19), Max: 98.8 (05 Jul 2022 23:17)  HR: 72 (06 Jul 2022 11:19) (68 - 160)  BP: 110/65 (06 Jul 2022 11:19) (96/56 - 133/83)  BP(mean): --  RR: 18 (06 Jul 2022 11:19) (16 - 21)  SpO2: 100% (06 Jul 2022 11:19) (97% - 100%)    Height (cm): 175.3 (07-05 @ 17:54)  Weight (kg): 98.6 (07-05 @ 19:37)  BMI (kg/m2): 32.1 (07-05 @ 19:37)  BSA (m2): 2.14 (07-05 @ 19:37)    PHYSICAL EXAM:  GENERAL: No apparent distress, appears stated age  HEAD:  Atraumatic, Normocephalic  EYES: Conjunctiva and sclera clear, no discharge  ENMT: Moist mucous membranes, no nasal discharge  NECK: Supple, no JVD  CHEST/LUNG: Clear to auscultation bilaterally, no wheeze or rales  HEART: Regular rhythm, no rubs or gallops  ABDOMEN: Soft, Nontender, Nondistended  EXTREMITIES:  No clubbing, cyanosis or edema  SKIN: No rash, no new discoloration  NERVOUS SYSTEM:  Alert & Oriented; Bilateral Lower extremity mobile, sensation to light touch intact      LABS:                        11.3   10.50 )-----------( 241      ( 06 Jul 2022 04:01 )             34.6     06 Jul 2022 04:01    141    |  108    |  8.5    ----------------------------<  88     3.6     |  24.0   |  0.48     Ca    8.4        06 Jul 2022 04:01  Mg     1.9       05 Jul 2022 18:35    TPro  7.1    /  Alb  4.6    /  TBili  0.4    /  DBili  x      /  AST  28     /  ALT  24     /  AlkPhos  53     05 Jul 2022 18:35    PT/INR - ( 05 Jul 2022 19:33 )   PT: 11.6 sec;   INR: 1.00 ratio         PTT - ( 06 Jul 2022 04:01 )  PTT:>200.0 sec    CAPILLARY BLOOD GLUCOSE          RADIOLOGY & ADDITIONAL TESTS:      Images reviewed personally    Consultant Notes Reviewed and Care Discussed with relevant Consultants.

## 2022-07-06 NOTE — DISCHARGE NOTE PROVIDER - CARE PROVIDER_API CALL
Svetlana Oliveros)  Cardiology; Cardiovascular Disease; Internal Medicine  [address] [place] [address]  Phone: [phone]  Fax: [phone]  Follow Up Time: 1 week

## 2022-07-06 NOTE — DISCHARGE NOTE NURSING/CASE MANAGEMENT/SOCIAL WORK - PATIENT PORTAL LINK FT
You can access the FollowMyHealth Patient Portal offered by [facility] by registering at the following website: [email or web address]. By joining [email or web address] FollowMyHealth portal, you will also be able to view your health information using other applications (apps) compatible with our system.

## 2022-07-06 NOTE — DISCHARGE NOTE NURSING/CASE MANAGEMENT/SOCIAL WORK - NSDCPEFALRISK_GEN_ALL_CORE
For information on Fall & Injury Prevention, visit: [email or web address] OR  [email or web address] OR  https://www.cdc.gov/steadi/patient.html

## 2022-07-06 NOTE — DISCHARGE NOTE PROVIDER - NSDCCPCAREPLAN_GEN_ALL_CORE_FT
PRINCIPAL DISCHARGE DIAGNOSIS  Diagnosis: Atrial fibrillation with RVR  Assessment and Plan of Treatment: now you are in normal sinus rhythm. As discussed with Cardiology, you may go back into the abnormal rhythm. This is called paroxysmal Atrial fibrillation. Continue your blood thinner and Metoprolol as instructed. You will need to see Cardiology for further evaluation/ Please call to make an appointment if one has not been given to you already. You will also need to have a stress test in the office      SECONDARY DISCHARGE DIAGNOSES  Diagnosis: HTN (hypertension)  Assessment and Plan of Treatment: Low sodium and fat diet, continue anti-hypertensive medications, and follow up with primary care physician. You need to continue Metoprolol and Amlodipine as instructed.

## 2022-07-06 NOTE — DISCHARGE NOTE PROVIDER - HOSPITAL COURSE
48 y/o female with PMHx of HTN and anemia came to the ED complaining of palpitation. Patient reported nausea and vomiting (non-bloody emesis) prior to arrival to [facility], which she attributed to the food she ate. She woke up this AM feeling lightheaded and palpitation, drank some Gatorade thinking it might be electrolytes imbalance from vomiting.  She states she still felt dizzy and decided to seek an evaluation here at [facility].  EKG in ER showed Atrial fibrillation with RVR. Otherwise Cardiac workup including troponin negative for any acute event.    Pt was started on BB, and full dose heparin gtt. (D-Dimer was elevated).  Pt since converted to NSR.  Cardio was consulted. Recommended Eliquis and ECHO prior to discharge. If negative pt to follow up stress test as outpatient and any other testing if indicated.         46 y/o female with PMHx of HTN and anemia came to the ED complaining of palpitation. Patient reported nausea and vomiting (non-bloody emesis) prior to arrival to [facility], which she attributed to the food she ate. She woke up this AM feeling lightheaded and palpitation, drank some Gatorade thinking it might be electrolytes imbalance from vomiting.  She states she still felt dizzy and decided to seek an evaluation here at [facility].  EKG in ER showed Atrial fibrillation with RVR. Otherwise Cardiac workup including troponin negative for any acute event.    Pt was started on BB, and full dose heparin gtt -  transitioned to Eliquis as per cardiology.  Pt since converted to NSR.  Cardio was consulted. Recommended Eliquis and ECHO prior to discharge. If negative pt to follow up stress test as outpatient and any other testing if indicated.

## 2022-07-06 NOTE — DISCHARGE NOTE PROVIDER - NSDCFUADDINST_GEN_ALL_CORE_FT
Please obtain further Eliquis dosing as per cardiologist outpatient. Please obtain further Eliquis dosing as per cardiologist outpatient.  report to ER if recurrence of palpitations or if any significant bleeding

## 2022-07-08 ENCOUNTER — NON-APPOINTMENT (OUTPATIENT)
Age: 47
End: 2022-07-08

## 2022-07-20 ENCOUNTER — APPOINTMENT (OUTPATIENT)
Dept: INTERNAL MEDICINE | Facility: CLINIC | Age: 47
End: 2022-07-20
Payer: COMMERCIAL

## 2022-07-20 VITALS
DIASTOLIC BLOOD PRESSURE: 80 MMHG | HEART RATE: 82 BPM | BODY MASS INDEX: 31.1 KG/M2 | HEIGHT: 69 IN | WEIGHT: 210 LBS | OXYGEN SATURATION: 99 % | SYSTOLIC BLOOD PRESSURE: 124 MMHG

## 2022-07-20 DIAGNOSIS — Z09 ENCOUNTER FOR FOLLOW-UP EXAMINATION AFTER COMPLETED TREATMENT FOR CONDITIONS OTHER THAN MALIGNANT NEOPLASM: ICD-10-CM

## 2022-07-20 PROCEDURE — 99214 OFFICE O/P EST MOD 30 MIN: CPT

## 2022-07-20 PROCEDURE — 99495 TRANSJ CARE MGMT MOD F2F 14D: CPT

## 2022-07-20 RX ORDER — AMOXICILLIN AND CLAVULANATE POTASSIUM 875; 125 MG/1; MG/1
875-125 TABLET, COATED ORAL TWICE DAILY
Qty: 14 | Refills: 0 | Status: DISCONTINUED | COMMUNITY
Start: 2021-12-16 | End: 2022-07-20

## 2022-07-20 RX ORDER — AMOXICILLIN AND CLAVULANATE POTASSIUM 875; 125 MG/1; MG/1
875-125 TABLET, COATED ORAL TWICE DAILY
Qty: 14 | Refills: 0 | Status: DISCONTINUED | COMMUNITY
Start: 2021-12-20 | End: 2022-07-20

## 2022-07-20 NOTE — HISTORY OF PRESENT ILLNESS
[FreeTextEntry1] : HOSPITAL FOLLOW UP  [de-identified] :  48YO FEMALE HERE FOR FOLLOWUP  AFTER HOSP STAY\par PT WAS FOUND TO BE IN AFIB AND WSA SEEN BY CARDIOLOGY AND CONVERTD OT NSR PT PLACE ADOLPH CABRERA\par HERE FOR FOLLOWUP

## 2022-07-20 NOTE — PLAN
[FreeTextEntry1] :   46YO FEMALE HERE FOR FOLLOWUP  AFTER HOSP STAY\par PT WAS FOUND TO BE IN AFIB AND WSA SEEN BY CARDIOLOGY AND CONVERTD OT NSR PT PLACE DON RICK\par HERE FOR FOLLOWUP\par FEELS OK NO FURHTER EPISODES OF\par AFIB OR CP OR SOB\par HAS APPT WITH CARDIOLOGY WILL DEFER LABS

## 2022-08-02 ENCOUNTER — APPOINTMENT (OUTPATIENT)
Dept: CARDIOLOGY | Facility: CLINIC | Age: 47
End: 2022-08-02

## 2022-08-02 ENCOUNTER — NON-APPOINTMENT (OUTPATIENT)
Age: 47
End: 2022-08-02

## 2022-08-02 VITALS
BODY MASS INDEX: 31.75 KG/M2 | OXYGEN SATURATION: 97 % | WEIGHT: 215 LBS | SYSTOLIC BLOOD PRESSURE: 119 MMHG | TEMPERATURE: 98.2 F | HEART RATE: 64 BPM | DIASTOLIC BLOOD PRESSURE: 78 MMHG

## 2022-08-02 DIAGNOSIS — N39.0 URINARY TRACT INFECTION, SITE NOT SPECIFIED: ICD-10-CM

## 2022-08-02 PROCEDURE — 93000 ELECTROCARDIOGRAM COMPLETE: CPT

## 2022-08-02 PROCEDURE — 99214 OFFICE O/P EST MOD 30 MIN: CPT

## 2022-08-02 RX ORDER — CIPROFLOXACIN HYDROCHLORIDE 500 MG/1
500 TABLET, FILM COATED ORAL
Qty: 14 | Refills: 0 | Status: DISCONTINUED | COMMUNITY
Start: 2022-07-12

## 2022-08-02 RX ORDER — APIXABAN 5 MG/1
5 TABLET, FILM COATED ORAL
Refills: 0 | Status: COMPLETED | COMMUNITY
Start: 2022-07-08 | End: 2022-08-02

## 2022-08-02 RX ORDER — NITROFURANTOIN (MONOHYDRATE/MACROCRYSTALS) 25; 75 MG/1; MG/1
100 CAPSULE ORAL
Qty: 14 | Refills: 0 | Status: DISCONTINUED | COMMUNITY
Start: 2022-06-15

## 2022-08-02 RX ORDER — FOSFOMYCIN TROMETHAMINE 3 G/1
3 POWDER ORAL
Qty: 1 | Refills: 0 | Status: DISCONTINUED | COMMUNITY
Start: 2022-06-24

## 2022-08-11 NOTE — CARDIOLOGY SUMMARY
[de-identified] : 8/2/22: sinus rhythm, narrow QRS [de-identified] : 7/6/22: \par Summary:\par  1. Left ventricular ejection fraction, by visual estimation, is 60 to 65%.\par  2. Normal global left ventricular systolic function.\par  3. There is no evidence of pericardial effusion.\par  4. Structurally normal mitral valve, with normal leaflet excursion.\par  5. Trace mitral valve regurgitation.\par  6. Normal trileaflet aortic valve with normal opening. [de-identified] : FINDINGS:\par \par LUNGS AND AIRWAYS: Patent central airways.  Lungs are clear.\par PLEURA: No pleural effusion.\par MEDIASTINUM AND ROVERTO: No lymphadenopathy.\par VESSELS: There is no pulmonary embolism.\par HEART: Heart size is normal. No pericardial effusion.\par CHEST WALL AND LOWER NECK: Within normal limits.\par VISUALIZED UPPER ABDOMEN: Incompletely characterized low areas of \par attenuation within bilateral kidneys for which nonemergent renal \par ultrasound correlation is advised. Gastric sleeve postoperative changes.\par BONES: Mild multilevel degenerative spine.\par \par IMPRESSION:\par \par No pulmonary embolism.\par \par No consolidation or pleural effusion.\par \par

## 2022-08-11 NOTE — PHYSICAL EXAM
[Well Developed] : well developed [No Acute Distress] : no acute distress [Normal S1, S2] : normal S1, S2 [No Murmur] : no murmur [Clear Lung Fields] : clear lung fields [No Respiratory Distress] : no respiratory distress  [Soft] : abdomen soft [No Edema] : no edema [Normal Gait] : normal gait [No Rash] : no rash [Moves all extremities] : moves all extremities [Alert and Oriented] : alert and oriented

## 2022-08-11 NOTE — REVIEW OF SYSTEMS
[Fever] : no fever [Chills] : no chills [Feeling Fatigued] : not feeling fatigued [SOB] : no shortness of breath [Chest Discomfort] : no chest discomfort [Orthopnea] : no orthopnea [Leg Claudication] : no intermittent leg claudication [Syncope] : no syncope [Dizziness] : no dizziness [Abdominal Pain] : no abdominal pain [Easy Bleeding] : a tendency for easy bleeding [Easy Bruising] : no tendency for easy bruising

## 2022-08-11 NOTE — HISTORY OF PRESENT ILLNESS
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 Patient reported nausea and vomiting (non-bloody emesis) the nght before which she attributed to the food she ate. She awoke feeling lightheaded and palpitation, drank some Gatorade thinking it might be electrolytes imbalance from vomiting. Her symptoms persisted however necessitating ED visit. She has no fever, chills, abdominal pain, diarrhea, dysuria, chest pain, shortness of breath, cough, sick contact, recent travel. \par \par Upon presentation to ED, was noted to be in AF with RVR. Was given IV rate control and spontaneously converted to sinus rhythm within 1-2 hours of presenting to [facility]. Seen by cariology - TTE showed normal LV function and CTA was negative for PE. DKP1ME3–VASc 2 (h/o HTN, F gender). Has previously been on Metoprolol 25mg daily but increased to Metoprolol 50mg at discharge given recent RVR. \par \par Patient has no DM. No prior TIA or CVA. Non smoker. No ETOH or Substance abuse.\par History of HTN, however, this has recently been well controlled since undergoing gastric sleeve and subsequent weight loss. \par \par Doing well and remains in sinus rhythm today. Denies symptomatic recurrence of AF - and she had never experience similar symptoms prior to this isolated event. Denies CP, SOB, MAYFIELD, dizziness, palpitations, syncope or presyncope - but reports fatigue on increased BB dose. Initially started Eliquis with BID dosing but experienced bleeding of the gums and has only been taking once daily. \par \par

## 2022-08-11 NOTE — DISCUSSION/SUMMARY
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 Patient reported nausea and vomiting (non-bloody emesis) the nght before which she attributed to the food she ate. She awoke feeling lightheaded and palpitation, drank some Gatorade thinking it might be electrolytes imbalance from vomiting. Her symptoms persisted however necessitating ED visit. She has no fever, chills, abdominal pain, diarrhea, dysuria, chest pain, shortness of breath, cough, sick contact, recent travel. \par \par Upon presentation to ED, was noted to be in AF with RVR. Was given IV rate control and spontaneously converted to sinus rhythm within 1-2 hours of presenting to [facility]. Seen by cariology - TTE showed normal LV function and CTA was negative for PE. [id number]–VASc 2 (h/o HTN, F gender). Has previously been on Metoprolol 25mg daily but increased to Metoprolol 50mg at discharge given recent RVR. \par EP was not consulted at [facility]\par \par Patient has no DM. No prior TIA or CVA. Non smoker. No ETOH or Substance abuse.\par History of HTN, however, this has recently been well controlled since undergoing gastric sleeve and subsequent weight loss. \par \par Doing well and remains in sinus rhythm today. Denies symptomatic recurrence of AF - and she had never experience similar symptoms prior to this isolated event. Denies CP, SOB, MAYFIELD, dizziness, palpitations, syncope or presyncope - but reports fatigue on increased BB dose. Initially started Eliquis with BID dosing but experienced bleeding of the gums and has only been taking once daily.  Expressed a strong desire to avoid anticoagulation.\par \par - Thus far, appears to have isolated episode of PAF with RVR (< 12 hours duration) that occurred in the setting of GI upset, alcohol intake and suspected dehydration. \par - Will arrange 2 week [facility]OT to screen for ongoing PAF.\par - Will decreased Metoprolol to 25mg daily given fatigue on higher dose.\par - DTG0SR2–VASc 2 (however BP well controlled, and including F gender). Expressed a strong desire to avoid a/c. Given (apparently isolated AF event < 24 hours, and low risk profile - can discontinue a/c at this time. Start ASA 81mg\par - Will arrange EP consult after monitoring \par \par Discussed with Dr. Roth\par Will arrange EP consult after MCOT monitor to discuss further management \par Erma Florez PAC\par

## 2022-08-14 ENCOUNTER — RX RENEWAL (OUTPATIENT)
Age: 47
End: 2022-08-14

## 2022-08-16 ENCOUNTER — APPOINTMENT (OUTPATIENT)
Dept: INTERNAL MEDICINE | Facility: CLINIC | Age: 47
End: 2022-08-16

## 2022-08-16 VITALS
WEIGHT: 205 LBS | BODY MASS INDEX: 30.36 KG/M2 | HEART RATE: 76 BPM | HEIGHT: 69 IN | DIASTOLIC BLOOD PRESSURE: 96 MMHG | RESPIRATION RATE: 16 BRPM | SYSTOLIC BLOOD PRESSURE: 143 MMHG

## 2022-08-16 DIAGNOSIS — Z12.11 ENCOUNTER FOR SCREENING FOR MALIGNANT NEOPLASM OF COLON: ICD-10-CM

## 2022-08-16 DIAGNOSIS — Z12.39 ENCOUNTER FOR OTHER SCREENING FOR MALIGNANT NEOPLASM OF BREAST: ICD-10-CM

## 2022-08-16 PROCEDURE — 99396 PREV VISIT EST AGE 40-64: CPT | Mod: 25

## 2022-08-16 PROCEDURE — 36415 COLL VENOUS BLD VENIPUNCTURE: CPT

## 2022-08-16 NOTE — HISTORY OF PRESENT ILLNESS
[FreeTextEntry1] : physical exam  [de-identified] : Ms. GLENDY WALKER is a 47 year female with a PMH of HTN comes to the office for follow up of medical conditions and physical exam. Patient denies fever, cough SOB. No other complaints at this time. \par

## 2022-08-16 NOTE — HEALTH RISK ASSESSMENT
[Never] : Never [No] : In the past 12 months have you used drugs other than those required for medical reasons? No [0] : 2) Feeling down, depressed, or hopeless: Not at all (0) [PHQ-2 Negative - No further assessment needed] : PHQ-2 Negative - No further assessment needed [Patient declined Low Dose CT Scan] : Patient declined Low Dose CT Scan [Patient declined Retinal Exam] : Patient declined Retinal Exam. [HIV test declined] : HIV test declined [Hepatitis C test declined] : Hepatitis C test declined [OGV3Pbblg] : 0

## 2022-08-16 NOTE — PLAN
[FreeTextEntry1] : In regards to patients Physical exam, routine blood work ordered, will review results with patient.\par \par History of AFIB- patient will continue to follow with cardiologist. \par \par form will be competed when blood work returns \par \par Counseling included abnormal lab results, differential diagnoses, treatment options, risks and benefits, lifestyle changes, current condition, medications, and dose adjustments. \par The patient was interactive, attentive, asked questions, and verbalized understanding

## 2022-08-26 LAB
25(OH)D3 SERPL-MCNC: 39.4 NG/ML
ALBUMIN SERPL ELPH-MCNC: 4.5 G/DL
ALP BLD-CCNC: 58 U/L
ALT SERPL-CCNC: 18 U/L
ANION GAP SERPL CALC-SCNC: 9 MMOL/L
AST SERPL-CCNC: 22 U/L
BASOPHILS # BLD AUTO: 0.05 K/UL
BASOPHILS NFR BLD AUTO: 0.7 %
BILIRUB SERPL-MCNC: 0.9 MG/DL
BUN SERPL-MCNC: 7 MG/DL
CALCIUM SERPL-MCNC: 10 MG/DL
CHLORIDE SERPL-SCNC: 107 MMOL/L
CHOLEST SERPL-MCNC: 157 MG/DL
CO2 SERPL-SCNC: 24 MMOL/L
CREAT SERPL-MCNC: 0.59 MG/DL
EGFR: 111 ML/MIN/1.73M2
EOSINOPHIL # BLD AUTO: 0.36 K/UL
EOSINOPHIL NFR BLD AUTO: 5.2 %
ESTIMATED AVERAGE GLUCOSE: 88 MG/DL
FOLATE SERPL-MCNC: 16.1 NG/ML
GLUCOSE SERPL-MCNC: 82 MG/DL
HBA1C MFR BLD HPLC: 4.7 %
HBV SURFACE AB SERPL IA-ACNC: 44.2 MIU/ML
HCT VFR BLD CALC: 39.2 %
HDLC SERPL-MCNC: 59 MG/DL
HGB BLD-MCNC: 12.9 G/DL
IMM GRANULOCYTES NFR BLD AUTO: 0.3 %
IRON SATN MFR SERPL: 59 %
IRON SERPL-MCNC: 171 UG/DL
LDLC SERPL CALC-MCNC: 77 MG/DL
LYMPHOCYTES # BLD AUTO: 2.06 K/UL
LYMPHOCYTES NFR BLD AUTO: 29.5 %
M TB IFN-G BLD-IMP: NEGATIVE
MAN DIFF?: NORMAL
MCHC RBC-ENTMCNC: 32.2 PG
MCHC RBC-ENTMCNC: 32.9 GM/DL
MCV RBC AUTO: 97.8 FL
MEV IGG FLD QL IA: 25 AU/ML
MEV IGG+IGM SER-IMP: POSITIVE
MONOCYTES # BLD AUTO: 0.58 K/UL
MONOCYTES NFR BLD AUTO: 8.3 %
MUV AB SER-ACNC: NEGATIVE
MUV IGG SER QL IA: 8.3 AU/ML
NEUTROPHILS # BLD AUTO: 3.91 K/UL
NEUTROPHILS NFR BLD AUTO: 56 %
NONHDLC SERPL-MCNC: 98 MG/DL
PLATELET # BLD AUTO: 252 K/UL
POTASSIUM SERPL-SCNC: 3.8 MMOL/L
PROT SERPL-MCNC: 7.1 G/DL
QUANTIFERON TB PLUS MITOGEN MINUS NIL: >10 IU/ML
QUANTIFERON TB PLUS NIL: 0.03 IU/ML
QUANTIFERON TB PLUS TB1 MINUS NIL: -0.01 IU/ML
QUANTIFERON TB PLUS TB2 MINUS NIL: -0.01 IU/ML
RBC # BLD: 4.01 M/UL
RBC # FLD: 12.6 %
RUBV IGG FLD-ACNC: 15.6 INDEX
RUBV IGG SER-IMP: POSITIVE
SODIUM SERPL-SCNC: 140 MMOL/L
TIBC SERPL-MCNC: 292 UG/DL
TRIGL SERPL-MCNC: 102 MG/DL
TSH SERPL-ACNC: 1.51 UIU/ML
UIBC SERPL-MCNC: 121 UG/DL
VIT B12 SERPL-MCNC: 478 PG/ML
VZV AB TITR SER: POSITIVE
VZV IGG SER IF-ACNC: 1299 INDEX
WBC # FLD AUTO: 6.98 K/UL

## 2022-09-01 ENCOUNTER — APPOINTMENT (OUTPATIENT)
Dept: INTERNAL MEDICINE | Facility: CLINIC | Age: 47
End: 2022-09-01

## 2022-09-01 PROCEDURE — 90471 IMMUNIZATION ADMIN: CPT

## 2022-09-01 PROCEDURE — 90707 MMR VACCINE SC: CPT

## 2022-09-12 ENCOUNTER — APPOINTMENT (OUTPATIENT)
Dept: ELECTROPHYSIOLOGY | Facility: CLINIC | Age: 47
End: 2022-09-12

## 2022-09-12 ENCOUNTER — RX RENEWAL (OUTPATIENT)
Age: 47
End: 2022-09-12

## 2022-09-12 VITALS
DIASTOLIC BLOOD PRESSURE: 86 MMHG | WEIGHT: 215 LBS | SYSTOLIC BLOOD PRESSURE: 142 MMHG | BODY MASS INDEX: 31.84 KG/M2 | OXYGEN SATURATION: 97 % | HEART RATE: 65 BPM | TEMPERATURE: 98.1 F | RESPIRATION RATE: 16 BRPM | HEIGHT: 69 IN

## 2022-09-12 DIAGNOSIS — R07.9 CHEST PAIN, UNSPECIFIED: ICD-10-CM

## 2022-09-12 PROCEDURE — 93000 ELECTROCARDIOGRAM COMPLETE: CPT

## 2022-09-12 PROCEDURE — 99205 OFFICE O/P NEW HI 60 MIN: CPT

## 2022-09-12 RX ORDER — METOPROLOL SUCCINATE 50 MG/1
50 TABLET, EXTENDED RELEASE ORAL
Qty: 90 | Refills: 0 | Status: DISCONTINUED | COMMUNITY
Start: 2022-07-20

## 2022-09-12 RX ORDER — AMOXICILLIN AND CLAVULANATE POTASSIUM 500; 125 MG/1; MG/1
500-125 TABLET, FILM COATED ORAL
Refills: 0 | Status: DISCONTINUED | COMMUNITY
Start: 2022-08-02 | End: 2022-09-12

## 2022-09-12 RX ORDER — AZELASTINE HYDROCHLORIDE 137 UG/1
0.1 SPRAY, METERED NASAL
Qty: 1 | Refills: 3 | Status: DISCONTINUED | COMMUNITY
Start: 2022-09-12 | End: 2022-09-12

## 2022-09-12 RX ORDER — FLUCONAZOLE 150 MG/1
150 TABLET ORAL
Qty: 1 | Refills: 0 | Status: DISCONTINUED | COMMUNITY
Start: 2021-10-15 | End: 2022-09-12

## 2022-09-12 RX ORDER — CLOTRIMAZOLE AND BETAMETHASONE DIPROPIONATE 10; .5 MG/G; MG/G
1-0.05 CREAM TOPICAL TWICE DAILY
Qty: 1 | Refills: 2 | Status: DISCONTINUED | COMMUNITY
Start: 2020-12-28 | End: 2022-09-12

## 2022-09-12 RX ORDER — AZELASTINE HYDROCHLORIDE 137 UG/1
137 SPRAY, METERED NASAL TWICE DAILY
Qty: 1 | Refills: 3 | Status: DISCONTINUED | COMMUNITY
Start: 2022-05-04 | End: 2022-09-12

## 2022-09-16 PROBLEM — R07.9 CHEST PAIN OF UNCERTAIN ETIOLOGY: Status: ACTIVE | Noted: 2022-09-16

## 2022-09-16 NOTE — REVIEW OF SYSTEMS
[Fever] : no fever [Chills] : no chills [SOB] : no shortness of breath [Syncope] : no syncope [Cough] : no cough [Wheezing] : no wheezing [Nausea] : no nausea [Vomiting] : no vomiting [Blood in Stool] : no blood in stool [Dizziness] : no dizziness

## 2022-09-16 NOTE — PHYSICAL EXAM
[Well Developed] : well developed [Well Nourished] : well nourished [No Acute Distress] : no acute distress [Normal Conjunctiva] : normal conjunctiva [Normal Venous Pressure] : normal venous pressure [No Carotid Bruit] : no carotid bruit [Normal S1, S2] : normal S1, S2 [No Murmur] : no murmur [No Rub] : no rub [No Gallop] : no gallop [Clear Lung Fields] : clear lung fields [Good Air Entry] : good air entry [No Respiratory Distress] : no respiratory distress  [Soft] : abdomen soft [Non Tender] : non-tender [Normal Gait] : normal gait [No Edema] : no edema [Moves all extremities] : moves all extremities [No Focal Deficits] : no focal deficits [Normal Speech] : normal speech [Alert and Oriented] : alert and oriented [Normal memory] : normal memory

## 2022-09-16 NOTE — HISTORY OF PRESENT ILLNESS
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 Patient reported nausea and vomiting (non-bloody emesis) the night before which she attributed to the food she ate. She awoke feeling lightheaded and palpitation, drank some Gatorade thinking it might be electrolytes imbalance from vomiting. Her symptoms persisted however necessitating ED visit. She has no fever, chills, abdominal pain, diarrhea, dysuria, chest pain, shortness of breath, cough, sick contact, recent travel. \par \par Upon presentation to ED, was noted to be in AF with RVR. Was given IV rate control and spontaneously converted to sinus rhythm within 1-2 hours of presenting to [facility]. Seen by cariology - TTE showed normal LV function and CTA was negative for PE. MOU9OW2–VASc 2 (h/o HTN, F gender). Has previously been on Metoprolol 25mg daily but increased to Metoprolol 50mg at discharge given recent RVR. \par \par History of HTN, however, this has recently been well controlled since undergoing gastric sleeve and subsequent weight loss. \par \par Doing well and denies symptomatic recurrence of AF - and she had never experience similar symptoms prior to this isolated event. She was initially started on Eliquis with BID by cardiology during her hospital visit but experienced bleeding of the gums and has only been taking once daily. Expressed a strong desire to avoid anticoagulation. Was seen on cardiology follow up on 2022 and Eliquis was exchanged to aspirin and she was referred to see me for EP consultation. She reports recent mild, sharp RSCP that started yesterday and has no radiation or other associated symptoms. This sometimes get worse with palpitation and she experienced something similar in the past that self-resolved.\par \par She used to be on metoprolol 25 mg which was increased to 50 mg in July of this year. She could not tolerate this and went back to 25 mg now.   \par \par She denies any fevers, chills, cough, sweats, chest pain, palpitations, dyspnea on exertion, orthopnea, paroxysmal nocturnal dyspnea, peripheral edema, lightheadedness, dizziness, syncope, nausea, vomiting, melena, hematochezia, or hematemesis.\par \par Social history:\par Negative for smoking, illicit drugs or alcohol abuse.\par \par Family history:\par Negative for premature CAD or SCD. Mother had AF and PPM at age 55. Not sure what was the indication of the PM. \par \par \par TESTS:\par ECG 2022: NSR\par EC22: sinus rhythm, narrow QRS EpydoRdobpli843Gje BbrdrFaxkgvh332Lscim VoldhUhehiou550Nle DincyOycodlz103Zsxsq NvpigLmmgkau467Omn KdrbuOohesxi324Vxdmx EjjmxPzwxtnp437Fta PxseyAsqqwrg362Rdauc\par ECG 2022 [facility] ED: afib. \par \par TTE Echo Complete w/ Contrast w/ Doppler (20 @ 10:13) >\par Summary:\par  1. Left ventricular ejection fraction, by visual estimation, is 55 to 60%.\par  2. Normal global left ventricular systolic function.\par  3. Mildly increased LV wall thickness.\par  4. There is no evidence of pericardial effusion.\par  5. Endocardial visualization was enhanced with intravenous echo contrast.\par MD Christiano Electronically signed on 3/18/2020 at 6:46:46 PM\par < end of copied text >\par \par CT [facility] 2022: FINDINGS:\par IMPRESSION:\par -	No pulmonary embolism. No consolidation or pleural effusion.\par

## 2022-09-16 NOTE — ASSESSMENT
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 Patient reported nausea and vomiting (non-bloody emesis) the night before which she attributed to the food she ate. She awoke feeling lightheaded and palpitation, drank some Gatorade thinking it might be electrolytes imbalance from vomiting. Her symptoms persisted however necessitating ED visit. She has no fever, chills, abdominal pain, diarrhea, dysuria, chest pain, shortness of breath, cough, sick contact, recent travel. \par \par Upon presentation to ED, was noted to be in AF with RVR. Was given IV rate control and spontaneously converted to sinus rhythm within 1-2 hours of presenting to [facility]. Seen by cariology - TTE showed normal LV function and CTA was negative for PE. [id number]–VASc 2 (h/o HTN, F gender). Has previously been on Metoprolol 25mg daily but increased to Metoprolol 50mg at discharge given recent RVR. \par \par History of HTN, however, this has recently been well controlled since undergoing gastric sleeve and subsequent weight loss. \par \par Doing well and denies symptomatic recurrence of AF - and she had never experience similar symptoms prior to this isolated event. She was initially started on Eliquis with BID by cardiology during her hospital visit but experienced bleeding of the gums and has only been taking once daily. Expressed a strong desire to avoid anticoagulation. Was seen on cardiology follow up on 8/2/2022 and Eliquis was exchanged to aspirin and she was referred to see me for EP consultation. She reports recent mild, sharp RSCP that started yesterday and has no radiation or other associated symptoms. This sometimes get worse with palpitation and she experienced something similar in the past that self-resolved.\par \par She used to be on metoprolol 25 mg which was increased to 50 mg in July of this year. She could not tolerate this and went back to 25 mg now.   \par \par We had a lengthy discussion about the diagnosis of atrial fibrillation, its prognosis and options for management (rate vs rhythm control), and the advantages and disadvantages of each. We also discussed the options for rhythm control with either medications, catheter ablation, or both. \par \par She just finished wearing an MCOT. \par

## 2022-09-16 NOTE — DISCUSSION/SUMMARY
[FreeTextEntry1] : - Explained that, I suspect that she has AF risk factors and likely to have recurrent AF (that can be silent). She likes to think a bit more about rate/rhythm control. \par - Follow up in 1 months with MCOT results\par - Will order cardiac CT to screen for CAD given her CP and risk factors.\par \par She will continue to follow up with Dr. Oliveros if needed non arrhythmic cardiac care.  [EKG obtained to assist in diagnosis and management of assessed problem(s)] : EKG obtained to assist in diagnosis and management of assessed problem(s)

## 2022-10-12 ENCOUNTER — APPOINTMENT (OUTPATIENT)
Dept: INTERNAL MEDICINE | Facility: CLINIC | Age: 47
End: 2022-10-12

## 2022-10-17 ENCOUNTER — APPOINTMENT (OUTPATIENT)
Dept: ELECTROPHYSIOLOGY | Facility: CLINIC | Age: 47
End: 2022-10-17

## 2022-10-17 ENCOUNTER — NON-APPOINTMENT (OUTPATIENT)
Age: 47
End: 2022-10-17

## 2022-10-17 VITALS
HEIGHT: 69 IN | SYSTOLIC BLOOD PRESSURE: 142 MMHG | WEIGHT: 220 LBS | OXYGEN SATURATION: 96 % | DIASTOLIC BLOOD PRESSURE: 90 MMHG | BODY MASS INDEX: 32.58 KG/M2 | TEMPERATURE: 98.6 F | HEART RATE: 90 BPM

## 2022-10-17 DIAGNOSIS — I49.3 VENTRICULAR PREMATURE DEPOLARIZATION: ICD-10-CM

## 2022-10-17 PROCEDURE — 93000 ELECTROCARDIOGRAM COMPLETE: CPT

## 2022-10-17 PROCEDURE — 99214 OFFICE O/P EST MOD 30 MIN: CPT

## 2022-10-17 NOTE — HISTORY OF PRESENT ILLNESS
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 Patient reported nausea and vomiting (non-bloody emesis) the night before which she attributed to the food she ate. She awoke feeling lightheaded and palpitation, drank some Gatorade thinking it might be electrolytes imbalance from vomiting. Her symptoms persisted however necessitating ED visit. She has no fever, chills, abdominal pain, diarrhea, dysuria, chest pain, shortness of breath, cough, sick contact, recent travel. \par \par Upon presentation to ED, was noted to be in AF with RVR. Was given IV rate control and spontaneously converted to sinus rhythm within 1-2 hours of presenting to [facility]. Seen by cariology - TTE showed normal LV function and CTA was negative for PE. [id number] 2 (h/o HTN, F gender). Has previously been on Metoprolol 25mg daily but increased to Metoprolol 50mg at discharge given recent RVR. \par \par History of HTN, however, this has recently been well controlled since undergoing gastric sleeve and subsequent weight loss. \par \par Doing well and denies symptomatic recurrence of AF - and she had never experience similar symptoms prior to this isolated event. She was initially started on Eliquis with BID by cardiology during her hospital visit but experienced bleeding of the gums and has only been taking once daily. Expressed a strong desire to avoid anticoagulation. Was seen on cardiology follow up on 2022 and Eliquis was exchanged to aspirin and she was referred to see me for EP consultation. She reports recent mild, sharp RSCP that started yesterday and has no radiation or other associated symptoms. This sometimes get worse with palpitation and she experienced something similar in the past that self-resolved.\par \par She used to be on metoprolol 25 mg which was increased to 50 mg in July of this year. She could not tolerate this and went back to 25 mg now.   \par \par 10/17/2022\par Above from initial evaluation on 2022. In that visit, we planned a one week MCOT to screen for AF and cardiac CT to workup her CP. We discussed that her AF episode was unlikely to be completely driven by her GI symptoms and discussed rate and rhythm control as well as stroke prevention. She wanted to think about all that. She is here for follow up. Her MCOT showed no AF and 2% asymptomatic PVC burden. We discussed options of AF monitoring including Apple watch. She has been using EKG -capable Apple watch which did not alert her for any arrhythmia. Still has the same stable CP. She is still waiting on cardiac CT. She denies any fevers, chills, cough, sweats, palpitations, dyspnea on exertion, orthopnea, paroxysmal nocturnal dyspnea, peripheral edema, lightheadedness, dizziness, syncope, nausea, vomiting, melena, hematochezia, or hematemesis.\par \par Social history:\par Negative for smoking, illicit drugs or alcohol abuse.\par \par Family history:\par Negative for premature CAD or SCD. Mother had AF and PPM at age 55. Not sure what was the indication of the PM. \par \par TESTS:\par ECG 10/17/2022: SR with nonspecific T wave abnormality. \par ECG 2022: NSR\par EC22: sinus rhythm, narrow QRS [id number] PrphlUwocfwz095Qgenv AbifsWvbrvzt163Pii VrdbxMwmtmlf039Xuizm EroskReerdjq881Wla [id number] KskkcWxalhce044Uuu CfjtlZhakxqg609Ojuvu\par ECG 2022 [facility] ED: afib. \par \par MCOT 2022: SR with no AF and asymptomatic 2% PVC burden. \par \par TTE Echo Complete w/ Contrast w/ Doppler (20 @ 10:13) >\par Summary:\par  1. Left ventricular ejection fraction, by visual estimation, is 55 to 60%.\par  2. Normal global left ventricular systolic function.\par  3. Mildly increased LV wall thickness.\par  4. There is no evidence of pericardial effusion.\par  5. Endocardial visualization was enhanced with intravenous echo contrast.\par MD Christiano Electronically signed on 3/18/2020 at 6:46:46 PM\par < end of copied text >\par \par CT [facility] 2022: FINDINGS:\par IMPRESSION:\par -	No pulmonary embolism. No consolidation or pleural effusion.\par

## 2022-10-17 NOTE — DISCUSSION/SUMMARY
[EKG obtained to assist in diagnosis and management of assessed problem(s)] : EKG obtained to assist in diagnosis and management of assessed problem(s) [FreeTextEntry1] : 1. P.AFIB. We had lengthy discussion about AF and management and motioning options as outlined in prior note. She likes to c/w monitoring for now. Her BP is managed by her PCP. \par -  MCOT in 6 months\par - Apple watch for monitoring too\par - Cardiac CT\par \par 2. Frequent asymptomatic PVCs. Counseled her about this. Will monitor with MCOT. Will consider PVC suppression if had high burden (>10%), symptoms, or PVC induced sustained VT or VF.\par \par Follow up in 6 months or sooner if needed

## 2022-10-17 NOTE — PHYSICAL EXAM
[Well Developed] : well developed [Well Nourished] : well nourished [No Acute Distress] : no acute distress [Normal Conjunctiva] : normal conjunctiva [Normal Venous Pressure] : normal venous pressure [Normal S1, S2] : normal S1, S2 [No Murmur] : no murmur [No Rub] : no rub [No Gallop] : no gallop [Clear Lung Fields] : clear lung fields [Good Air Entry] : good air entry [No Respiratory Distress] : no respiratory distress  [Soft] : abdomen soft [Non Tender] : non-tender [No Edema] : no edema [Normal Gait] : normal gait [Moves all extremities] : moves all extremities [No Focal Deficits] : no focal deficits [Normal Speech] : normal speech [Alert and Oriented] : alert and oriented [Normal memory] : normal memory

## 2022-12-16 ENCOUNTER — RX RENEWAL (OUTPATIENT)
Age: 47
End: 2022-12-16

## 2023-01-01 ENCOUNTER — NON-APPOINTMENT (OUTPATIENT)
Age: 48
End: 2023-01-01

## 2023-01-13 ENCOUNTER — APPOINTMENT (OUTPATIENT)
Dept: INTERNAL MEDICINE | Facility: CLINIC | Age: 48
End: 2023-01-13
Payer: COMMERCIAL

## 2023-01-13 VITALS
HEIGHT: 69 IN | WEIGHT: 220 LBS | SYSTOLIC BLOOD PRESSURE: 137 MMHG | DIASTOLIC BLOOD PRESSURE: 83 MMHG | HEART RATE: 98 BPM | BODY MASS INDEX: 32.58 KG/M2

## 2023-01-13 DIAGNOSIS — Z13.89 ENCOUNTER FOR SCREENING FOR OTHER DISORDER: ICD-10-CM

## 2023-01-13 DIAGNOSIS — Z02.89 ENCOUNTER FOR OTHER ADMINISTRATIVE EXAMINATIONS: ICD-10-CM

## 2023-01-13 PROCEDURE — 99396 PREV VISIT EST AGE 40-64: CPT | Mod: 25

## 2023-01-13 PROCEDURE — 36415 COLL VENOUS BLD VENIPUNCTURE: CPT

## 2023-01-13 NOTE — HEALTH RISK ASSESSMENT
[Never] : Never [No] : In the past 12 months have you used drugs other than those required for medical reasons? No [0] : 2) Feeling down, depressed, or hopeless: Not at all (0) [PHQ-2 Negative - No further assessment needed] : PHQ-2 Negative - No further assessment needed [Patient declined Low Dose CT Scan] : Patient declined Low Dose CT Scan [Patient declined Retinal Exam] : Patient declined Retinal Exam. [HIV test declined] : HIV test declined [Hepatitis C test declined] : Hepatitis C test declined [[id number]] : 0

## 2023-01-13 NOTE — HISTORY OF PRESENT ILLNESS
[FreeTextEntry1] : physical exam  [de-identified] : Ms. GLENDY WALKER is a 47 year female with a PMH of HTN comes to the office for follow up of medical conditions and physical exam. Patient denies fever, cough SOB. No other complaints at this time. \par

## 2023-01-16 LAB
25(OH)D3 SERPL-MCNC: 43 NG/ML
ALBUMIN SERPL ELPH-MCNC: 4.6 G/DL
ALP BLD-CCNC: 62 U/L
ALT SERPL-CCNC: 30 U/L
ANION GAP SERPL CALC-SCNC: 9 MMOL/L
APPEARANCE: CLEAR
AST SERPL-CCNC: 18 U/L
BACTERIA UR CULT: NORMAL
BACTERIA: NEGATIVE
BASOPHILS # BLD AUTO: 0.04 K/UL
BASOPHILS NFR BLD AUTO: 0.6 %
BILIRUB SERPL-MCNC: 0.7 MG/DL
BILIRUBIN URINE: NEGATIVE
BLOOD URINE: NEGATIVE
BUN SERPL-MCNC: 8 MG/DL
CALCIUM SERPL-MCNC: 9.2 MG/DL
CHLORIDE SERPL-SCNC: 103 MMOL/L
CHOLEST SERPL-MCNC: 178 MG/DL
CO2 SERPL-SCNC: 26 MMOL/L
COLOR: NORMAL
CREAT SERPL-MCNC: 0.63 MG/DL
EGFR: 109 ML/MIN/1.73M2
EOSINOPHIL # BLD AUTO: 0.21 K/UL
EOSINOPHIL NFR BLD AUTO: 3 %
ESTIMATED AVERAGE GLUCOSE: 82 MG/DL
FOLATE SERPL-MCNC: 13.4 NG/ML
GLUCOSE QUALITATIVE U: NEGATIVE
GLUCOSE SERPL-MCNC: 93 MG/DL
HBA1C MFR BLD HPLC: 4.5 %
HCT VFR BLD CALC: 38.7 %
HDLC SERPL-MCNC: 50 MG/DL
HGB BLD-MCNC: 12.3 G/DL
HYALINE CASTS: 1 /LPF
IMM GRANULOCYTES NFR BLD AUTO: 0.3 %
IRON SATN MFR SERPL: 53 %
IRON SERPL-MCNC: 161 UG/DL
KETONES URINE: NEGATIVE
LDLC SERPL CALC-MCNC: 100 MG/DL
LEUKOCYTE ESTERASE URINE: NEGATIVE
LYMPHOCYTES # BLD AUTO: 1.75 K/UL
LYMPHOCYTES NFR BLD AUTO: 24.9 %
MAN DIFF?: NORMAL
MCHC RBC-ENTMCNC: 31.8 GM/DL
MCHC RBC-ENTMCNC: 31.8 PG
MCV RBC AUTO: 100 FL
MICROSCOPIC-UA: NORMAL
MONOCYTES # BLD AUTO: 0.67 K/UL
MONOCYTES NFR BLD AUTO: 9.5 %
NEUTROPHILS # BLD AUTO: 4.35 K/UL
NEUTROPHILS NFR BLD AUTO: 61.7 %
NITRITE URINE: NEGATIVE
NONHDLC SERPL-MCNC: 128 MG/DL
PH URINE: 6.5
PLATELET # BLD AUTO: 342 K/UL
POTASSIUM SERPL-SCNC: 4 MMOL/L
PROT SERPL-MCNC: 7.3 G/DL
PROTEIN URINE: NEGATIVE
RBC # BLD: 3.87 M/UL
RBC # FLD: 12.8 %
RED BLOOD CELLS URINE: 1 /HPF
SODIUM SERPL-SCNC: 139 MMOL/L
SPECIFIC GRAVITY URINE: 1.01
SQUAMOUS EPITHELIAL CELLS: 1 /HPF
TIBC SERPL-MCNC: 305 UG/DL
TRIGL SERPL-MCNC: 138 MG/DL
TSH SERPL-ACNC: 1.59 UIU/ML
UIBC SERPL-MCNC: 144 UG/DL
UROBILINOGEN URINE: NORMAL
VIT B12 SERPL-MCNC: 656 PG/ML
WBC # FLD AUTO: 7.04 K/UL
WHITE BLOOD CELLS URINE: 1 /HPF

## 2023-01-19 LAB
M TB IFN-G BLD-IMP: NEGATIVE
QUANTIFERON TB PLUS MITOGEN MINUS NIL: 9.03 IU/ML
QUANTIFERON TB PLUS NIL: 0.01 IU/ML
QUANTIFERON TB PLUS TB1 MINUS NIL: 0 IU/ML
QUANTIFERON TB PLUS TB2 MINUS NIL: 0 IU/ML

## 2023-01-30 ENCOUNTER — APPOINTMENT (OUTPATIENT)
Dept: INTERNAL MEDICINE | Facility: CLINIC | Age: 48
End: 2023-01-30
Payer: COMMERCIAL

## 2023-01-30 DIAGNOSIS — R68.89 OTHER GENERAL SYMPTOMS AND SIGNS: ICD-10-CM

## 2023-01-30 PROCEDURE — 99442: CPT

## 2023-03-13 ENCOUNTER — RX RENEWAL (OUTPATIENT)
Age: 48
End: 2023-03-13

## 2023-04-06 NOTE — H&P PST ADULT - NS PRO FEM REPRO HEALTH SCREEN
Pt is phoning stated that she is having diarrhea and lower left pain in her abdomen     Pt states that she has a dx of diverticulitis     No fever     Pt rates lower left abdominal pain 5/10    Per disposition:Go To ED/UCC Now (Or To Office With PCP Approval)     Care advice given per protocol and when to call back. Pt verbalized understanding and agrees to plan of care.    Loretta Ortiz RN  [facility] Nurse Advisor  9:15 AM 4/6/2023        Reason for Disposition    Constant abdominal pain lasting > 2 hours    Additional Information    Negative: Shock suspected (e.g., cold/pale/clammy skin, too weak to stand, low BP, rapid pulse)    Negative: Difficult to awaken or acting confused (e.g., disoriented, slurred speech)    Negative: Sounds like a life-threatening emergency to the triager    Negative: Vomiting also present and worse than the diarrhea    Negative: Blood in stool and without diarrhea    Negative: SEVERE abdominal pain (e.g., excruciating) and present > 1 hour    Negative: SEVERE abdominal pain and age > 60 years    Negative: Bloody, black, or tarry bowel movements (Exception: chronic-unchanged black-grey bowel movements and is taking iron pills or Pepto-Bismol)    Negative: SEVERE diarrhea (e.g., 7 or more times / day more than normal) and age > 60 years    Protocols used: DIARRHEA-A-OH       mammogram

## 2023-04-07 ENCOUNTER — NON-APPOINTMENT (OUTPATIENT)
Age: 48
End: 2023-04-07

## 2023-04-07 ENCOUNTER — LABORATORY RESULT (OUTPATIENT)
Age: 48
End: 2023-04-07

## 2023-04-07 ENCOUNTER — APPOINTMENT (OUTPATIENT)
Dept: INTERNAL MEDICINE | Facility: CLINIC | Age: 48
End: 2023-04-07
Payer: COMMERCIAL

## 2023-04-07 VITALS
HEART RATE: 79 BPM | SYSTOLIC BLOOD PRESSURE: 134 MMHG | HEIGHT: 69 IN | WEIGHT: 220 LBS | BODY MASS INDEX: 32.58 KG/M2 | DIASTOLIC BLOOD PRESSURE: 89 MMHG

## 2023-04-07 DIAGNOSIS — Z01.818 ENCOUNTER FOR OTHER PREPROCEDURAL EXAMINATION: ICD-10-CM

## 2023-04-07 PROCEDURE — 36415 COLL VENOUS BLD VENIPUNCTURE: CPT

## 2023-04-07 PROCEDURE — 99214 OFFICE O/P EST MOD 30 MIN: CPT | Mod: 25

## 2023-04-07 PROCEDURE — 93000 ELECTROCARDIOGRAM COMPLETE: CPT

## 2023-04-10 LAB
ABO + RH PNL BLD: NORMAL
ALBUMIN SERPL ELPH-MCNC: 4.7 G/DL
ALP BLD-CCNC: 55 U/L
ALT SERPL-CCNC: 19 U/L
ANION GAP SERPL CALC-SCNC: 12 MMOL/L
APPEARANCE: ABNORMAL
APTT BLD: 33.3 SEC
AST SERPL-CCNC: 16 U/L
BASOPHILS # BLD AUTO: 0.06 K/UL
BASOPHILS NFR BLD AUTO: 1 %
BILIRUB SERPL-MCNC: 0.7 MG/DL
BILIRUBIN URINE: NEGATIVE
BLOOD URINE: NEGATIVE
BUN SERPL-MCNC: 9 MG/DL
CALCIUM SERPL-MCNC: 10 MG/DL
CHLORIDE SERPL-SCNC: 103 MMOL/L
CO2 SERPL-SCNC: 25 MMOL/L
COLOR: YELLOW
CREAT SERPL-MCNC: 0.63 MG/DL
EGFR: 109 ML/MIN/1.73M2
EOSINOPHIL # BLD AUTO: 0.16 K/UL
EOSINOPHIL NFR BLD AUTO: 2.6 %
GLUCOSE QUALITATIVE U: NEGATIVE
GLUCOSE SERPL-MCNC: 77 MG/DL
HCG SERPL-MCNC: <1 MIU/ML
HCT VFR BLD CALC: 41.1 %
HGB BLD-MCNC: 12.9 G/DL
IMM GRANULOCYTES NFR BLD AUTO: 0.2 %
INR PPP: 0.95 RATIO
KETONES URINE: NEGATIVE
LEUKOCYTE ESTERASE URINE: NEGATIVE
LYMPHOCYTES # BLD AUTO: 1.47 K/UL
LYMPHOCYTES NFR BLD AUTO: 23.8 %
MAN DIFF?: NORMAL
MCHC RBC-ENTMCNC: 31.4 GM/DL
MCHC RBC-ENTMCNC: 32.3 PG
MCV RBC AUTO: 103 FL
MONOCYTES # BLD AUTO: 0.42 K/UL
MONOCYTES NFR BLD AUTO: 6.8 %
NEUTROPHILS # BLD AUTO: 4.05 K/UL
NEUTROPHILS NFR BLD AUTO: 65.6 %
NITRITE URINE: NEGATIVE
PH URINE: 7
PLATELET # BLD AUTO: 292 K/UL
POTASSIUM SERPL-SCNC: 4.4 MMOL/L
PROT SERPL-MCNC: 7.2 G/DL
PROTEIN URINE: NORMAL
PT BLD: 11 SEC
RBC # BLD: 3.99 M/UL
RBC # FLD: 13.4 %
SODIUM SERPL-SCNC: 140 MMOL/L
SPECIFIC GRAVITY URINE: 1.02
UROBILINOGEN URINE: NORMAL
WBC # FLD AUTO: 6.17 K/UL

## 2023-04-14 DIAGNOSIS — Z01.810 ENCOUNTER FOR PREPROCEDURAL CARDIOVASCULAR EXAMINATION: ICD-10-CM

## 2023-04-17 ENCOUNTER — APPOINTMENT (OUTPATIENT)
Dept: ELECTROPHYSIOLOGY | Facility: CLINIC | Age: 48
End: 2023-04-17
Payer: COMMERCIAL

## 2023-04-17 VITALS
OXYGEN SATURATION: 96 % | HEIGHT: 69 IN | HEART RATE: 69 BPM | WEIGHT: 214 LBS | SYSTOLIC BLOOD PRESSURE: 120 MMHG | TEMPERATURE: 98.7 F | DIASTOLIC BLOOD PRESSURE: 74 MMHG | BODY MASS INDEX: 31.7 KG/M2

## 2023-04-17 PROCEDURE — 93000 ELECTROCARDIOGRAM COMPLETE: CPT

## 2023-04-17 PROCEDURE — 99214 OFFICE O/P EST MOD 30 MIN: CPT

## 2023-04-17 RX ORDER — ASPIRIN 81 MG/1
81 TABLET ORAL DAILY
Qty: 90 | Refills: 1 | Status: DISCONTINUED | COMMUNITY
Start: 2022-08-02 | End: 2023-04-17

## 2023-04-17 RX ORDER — OSELTAMIVIR PHOSPHATE 75 MG/1
75 CAPSULE ORAL TWICE DAILY
Qty: 10 | Refills: 0 | Status: DISCONTINUED | COMMUNITY
Start: 2023-01-30 | End: 2023-04-17

## 2023-04-19 ENCOUNTER — APPOINTMENT (OUTPATIENT)
Dept: RADIOLOGY | Facility: CLINIC | Age: 48
End: 2023-04-19
Payer: COMMERCIAL

## 2023-04-19 ENCOUNTER — OUTPATIENT (OUTPATIENT)
Dept: OUTPATIENT SERVICES | Facility: HOSPITAL | Age: 48
LOS: 1 days | End: 2023-04-19
Payer: COMMERCIAL

## 2023-04-19 DIAGNOSIS — Z98.89 OTHER SPECIFIED POSTPROCEDURAL STATES: Chronic | ICD-10-CM

## 2023-04-19 DIAGNOSIS — Z00.8 ENCOUNTER FOR OTHER GENERAL EXAMINATION: ICD-10-CM

## 2023-04-19 DIAGNOSIS — Z01.818 ENCOUNTER FOR OTHER PREPROCEDURAL EXAMINATION: ICD-10-CM

## 2023-04-19 PROCEDURE — 71046 X-RAY EXAM CHEST 2 VIEWS: CPT

## 2023-04-19 PROCEDURE — 71046 X-RAY EXAM CHEST 2 VIEWS: CPT | Mod: 26

## 2023-04-28 NOTE — ASSESSMENT
[FreeTextEntry1] : 1. P.AFIB. We had lengthy discussion about AF and management and motioning options as outlined previously. She likes to c/w monitoring for now. Her BP is managed by her PCP. \par -  MCOT now\par - Apple watch for monitoring too\par - Cardiac CT\par - Will consider early rhythm control if recurrent AF. \par \par 2. Frequent asymptomatic PVCs. Counseled her about this. Will monitor with MCOT. Will consider PVC suppression if had high burden (>10%), symptoms, or PVC induced sustained VT or VF.\par \par Follow up in 6 months or sooner if needed\par She is planning a breast surgery and asked for clearance. Will clear her after cardiac CT.

## 2023-04-28 NOTE — ADDENDUM
[FreeTextEntry1] : \par Recent cardiac CTA showed CAD. Coronary artery calicum score 0\par \par Reviewed with Dr. Sepulveda. Patient is cleared from a cardiac perspective for upcoming breast surgery on 5/1/23.\par \par Erma Florez PAC\par

## 2023-04-28 NOTE — HISTORY OF PRESENT ILLNESS
[Atrial Fibrillation] : atrial fibrillation [No Pertinent Pulmonary History] : no history of asthma, COPD, sleep apnea, or smoking [No Adverse Anesthesia Reaction] : no adverse anesthesia reaction in self or family member [(Patient denies any chest pain, claudication, dyspnea on exertion, orthopnea, palpitations or syncope)] : Patient denies any chest pain, claudication, dyspnea on exertion, orthopnea, palpitations or syncope [Moderate (4-6 METs)] : Moderate (4-6 METs) [FreeTextEntry1] : Bilateral breast lift  [FreeTextEntry3] : Dr. Dakota Gaytan [FreeTextEntry2] : 05/02/23 [FreeTextEntry4] : Ms. GLENDY WALKER is a 48 year female with a PMH of HTN comes to the office for preoperative evaluation. Patient denies fever, cough SOB. No other complaints at this time.

## 2023-04-28 NOTE — HISTORY OF PRESENT ILLNESS
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 Patient reported nausea and vomiting (non-bloody emesis) the night before which she attributed to the food she ate. She awoke feeling lightheaded and palpitation, drank some Gatorade thinking it might be electrolytes imbalance from vomiting. Her symptoms persisted however necessitating ED visit. She has no fever, chills, abdominal pain, diarrhea, dysuria, chest pain, shortness of breath, cough, sick contact, recent travel. \par \par Upon presentation to ED, was noted to be in AF with RVR. Was given IV rate control and spontaneously converted to sinus rhythm within 1-2 hours of presenting to [facility]. Seen by cariology - TTE showed normal LV function and CTA was negative for PE. [id number] 2 (h/o HTN, F gender). Has previously been on Metoprolol 25mg daily but increased to Metoprolol 50mg at discharge given recent RVR. \par \par History of HTN, however, this has recently been well controlled since undergoing gastric sleeve and subsequent weight loss. \par \par Doing well and denies symptomatic recurrence of AF - and she had never experience similar symptoms prior to this isolated event. She was initially started on Eliquis with BID by cardiology during her hospital visit but experienced bleeding of the gums and has only been taking once daily. Expressed a strong desire to avoid anticoagulation. Was seen on cardiology follow up on 2022 and Eliquis was exchanged to aspirin and she was referred to see me for EP consultation. She reports recent mild, sharp RSCP that started yesterday and has no radiation or other associated symptoms. This sometimes get worse with palpitation and she experienced something similar in the past that self-resolved.\par \par She used to be on metoprolol 25 mg which was increased to 50 mg in July of this year. She could not tolerate this and went back to 25 mg now.   \par \par 10/17/2022\par Above from initial evaluation on 2022. In that visit, we planned a one week MCOT to screen for AF and cardiac CT to workup her CP. We discussed that her AF episode was unlikely to be completely driven by her GI symptoms and discussed rate and rhythm control as well as stroke prevention. She wanted to think about all that. She is here for follow up. Her MCOT showed no AF and 2% asymptomatic PVC burden. We discussed options of AF monitoring including Apple watch. She has been using EKG -capable Apple watch which did not alert her for any arrhythmia. Still has the same stable CP. She is still waiting on cardiac CT. She denies any fevers, chills, cough, sweats, palpitations, dyspnea on exertion, orthopnea, paroxysmal nocturnal dyspnea, peripheral edema, lightheadedness, dizziness, syncope, nausea, vomiting, melena, hematochezia, or hematemesis.\par \par 23:\par She reports no recurrent AF based on symptoms or Apple watch. She is taking metoprolol and amlodipine and stopped aspirin. She did not get the monitor and reports it was denied by insurance. Did not do the cardiac CT yet, confirmed no insurance authorization needed and discussed with pt. She denies any fevers, chills, cough, sweats, chest pain, palpitations, dyspnea on exertion, orthopnea, paroxysmal nocturnal dyspnea, peripheral edema, lightheadedness, dizziness, syncope, nausea, vomiting, melena, hematochezia, or hematemesis.\par \par Social history:\par Negative for smoking, illicit drugs or alcohol abuse.\par \par Family history:\par Negative for premature CAD or SCD. Mother had AF and PPM at age 55. Not sure what was the indication of the PM. \par \par TESTS:\par ECG 23: NSR\par ECG 10/17/2022: SR with nonspecific T wave abnormality. \par ECG 2022: NSR\par EC22: sinus rhythm, narrow QRS\par ECG 2022 [facility] ED: AFIB \par \par MCOT 2022: SR with no AF and asymptomatic 2% PVC burden. \par \par TTE Echo Complete w/ Contrast w/ Doppler (20 @ 10:13) >\par Summary:\par  1. Left ventricular ejection fraction, by visual estimation, is 55 to 60%.\par  2. Normal global left ventricular systolic function.\par  3. Mildly increased LV wall thickness.\par  4. There is no evidence of pericardial effusion.\par  5. Endocardial visualization was enhanced with intravenous echo contrast.\par MD Christiano Electronically signed on 3/18/2020 at 6:46:46 PM\par < end of copied text >\par \par CT [facility] 2022: FINDINGS:\par IMPRESSION:\par -	No pulmonary embolism. No consolidation or pleural effusion.\par

## 2023-04-28 NOTE — ASSESSMENT
[Patient Optimized for Surgery] : Patient optimized for surgery [No Further Testing Recommended] : no further testing recommended [As per surgery] : as per surgery [FreeTextEntry4] : Ms. GLENDY WALKER is a 48 year female with a PMH of HTN comes to the office for preoperative evaluation. Patient denies fever, cough SOB. No other complaints at this time.  Patient has greater than 4 METS, is a low- moderate perioperative risk for Major adverse cardiac event. ECG and blood work reviewed. Cardiac preoperative note reviewed. Cardiac CT showed a calcium score of 0. No further testing indicated at this time. Patient is medically optimized for this procedure.

## 2023-04-28 NOTE — PLAN
[FreeTextEntry1] : Ms. GLENDY WALKER is a 48 year female with a PMH of HTN comes to the office for preoperative evaluation. Patient denies fever, cough SOB. No other complaints at this time.  Patient has greater than 4 METS, is a low- moderate perioperative risk for Major adverse cardiac event. ECG and blood work reviewed. Cardiac preoperative note reviewed. Cardiac CT showed a calcium score of 0. No further testing indicated at this time. Patient is medically optimized for this procedure. \par \par Prior to appointment and during encounter with patient extensive medical records were reviewed including but not limited to, Hospital records records, out patient records, laboratory data and microbiology data \par In addition extensive time was also spent in reviewing diagnostic studies.\par \par Total encounter total time 30 mins\par >50% of time spent counseling/coordinating care \par \par Counseling included abnormal lab results, differential diagnoses, treatment options, risks and benefits, lifestyle changes, current condition, medications, and dose adjustments. \par The patient was interactive, attentive, asked questions, and verbalized understanding

## 2023-05-11 ENCOUNTER — TRANSCRIPTION ENCOUNTER (OUTPATIENT)
Age: 48
End: 2023-05-11

## 2023-05-12 ENCOUNTER — APPOINTMENT (OUTPATIENT)
Dept: INTERNAL MEDICINE | Facility: CLINIC | Age: 48
End: 2023-05-12
Payer: COMMERCIAL

## 2023-05-12 VITALS
HEIGHT: 69 IN | BODY MASS INDEX: 31.7 KG/M2 | SYSTOLIC BLOOD PRESSURE: 123 MMHG | DIASTOLIC BLOOD PRESSURE: 84 MMHG | WEIGHT: 214 LBS | HEART RATE: 73 BPM

## 2023-05-12 PROCEDURE — 99213 OFFICE O/P EST LOW 20 MIN: CPT

## 2023-05-12 NOTE — PHYSICAL EXAM
[No Acute Distress] : no acute distress [Well Nourished] : well nourished [Well Developed] : well developed [Well-Appearing] : well-appearing [Normal Sclera/Conjunctiva] : normal sclera/conjunctiva [PERRL] : pupils equal round and reactive to light [EOMI] : extraocular movements intact [Normal Outer Ear/Nose] : the outer ears and nose were normal in appearance [Normal Oropharynx] : the oropharynx was normal [No JVD] : no jugular venous distention [No Lymphadenopathy] : no lymphadenopathy [Supple] : supple [Thyroid Normal, No Nodules] : the thyroid was normal and there were no nodules present [No Respiratory Distress] : no respiratory distress  [No Accessory Muscle Use] : no accessory muscle use [Clear to Auscultation] : lungs were clear to auscultation bilaterally [Normal Rate] : normal rate  [Regular Rhythm] : with a regular rhythm [Normal S1, S2] : normal S1 and S2 [No Murmur] : no murmur heard [No Carotid Bruits] : no carotid bruits [No Abdominal Bruit] : a ~M bruit was not heard ~T in the abdomen [No Varicosities] : no varicosities [Pedal Pulses Present] : the pedal pulses are present [No Edema] : there was no peripheral edema [No Palpable Aorta] : no palpable aorta [No Extremity Clubbing/Cyanosis] : no extremity clubbing/cyanosis [Normal Appearance] : normal in appearance [No Nipple Discharge] : no nipple discharge [Soft] : abdomen soft [Non Tender] : non-tender [Non-distended] : non-distended [No Masses] : no abdominal mass palpated [No HSM] : no HSM [Normal Bowel Sounds] : normal bowel sounds [Normal Posterior Cervical Nodes] : no posterior cervical lymphadenopathy [Normal Anterior Cervical Nodes] : no anterior cervical lymphadenopathy [No CVA Tenderness] : no CVA  tenderness [No Spinal Tenderness] : no spinal tenderness [No Joint Swelling] : no joint swelling [Grossly Normal Strength/Tone] : grossly normal strength/tone [No Rash] : no rash [Coordination Grossly Intact] : coordination grossly intact [No Focal Deficits] : no focal deficits [Normal Gait] : normal gait [Deep Tendon Reflexes (DTR)] : deep tendon reflexes were 2+ and symmetric [Normal Affect] : the affect was normal [Normal Insight/Judgement] : insight and judgment were intact [de-identified] : No surrounding erythema, no active bleeding or pustular discharge from surgical incisions. . Patient preferred not to have a Chaperone in the room during exam.

## 2023-05-12 NOTE — PLAN
10-May-2019 23:36 [FreeTextEntry1] : HTN-  patient's BP well controlled with current medication. will continue current  regimen \par \par Post surgical inspection,No surrounding erythema, no active bleeding or pustular discharge from surgical incisions. Patient will contact her surgeon for follow up \par \par Patient advised to go to hospital if any signs or symptoms of infection are noted. \par \par Prior to appointment and during encounter with patient extensive medical records were reviewed including but not limited to, Hospital records records, out patient records, laboratory data and microbiology data \par In addition extensive time was also spent in reviewing diagnostic studies.\par \par Total encounter total time 20 mins\par >50% of time spent counseling/coordinating care\par \par \par Counseling included abnormal lab results, differential diagnoses, treatment options, risks and benefits, lifestyle changes, current condition, medications, and dose adjustments. \par The patient was interactive, attentive, asked questions, and verbalized understanding

## 2023-05-12 NOTE — HEALTH RISK ASSESSMENT
[0] : 2) Feeling down, depressed, or hopeless: Not at all (0) [PHQ-2 Negative - No further assessment needed] : PHQ-2 Negative - No further assessment needed [[id number]] : 0

## 2023-05-22 ENCOUNTER — APPOINTMENT (OUTPATIENT)
Dept: INTERNAL MEDICINE | Facility: CLINIC | Age: 48
End: 2023-05-22
Payer: COMMERCIAL

## 2023-05-22 VITALS
SYSTOLIC BLOOD PRESSURE: 131 MMHG | BODY MASS INDEX: 31.7 KG/M2 | HEART RATE: 83 BPM | HEIGHT: 69 IN | DIASTOLIC BLOOD PRESSURE: 85 MMHG | WEIGHT: 214 LBS

## 2023-05-22 DIAGNOSIS — Z48.02 ENCOUNTER FOR REMOVAL OF SUTURES: ICD-10-CM

## 2023-05-22 PROCEDURE — 99213 OFFICE O/P EST LOW 20 MIN: CPT

## 2023-05-22 NOTE — PHYSICAL EXAM
[Well Nourished] : well nourished [No Acute Distress] : no acute distress [Well Developed] : well developed [Well-Appearing] : well-appearing [Normal Sclera/Conjunctiva] : normal sclera/conjunctiva [PERRL] : pupils equal round and reactive to light [EOMI] : extraocular movements intact [Normal Outer Ear/Nose] : the outer ears and nose were normal in appearance [Normal Oropharynx] : the oropharynx was normal [No JVD] : no jugular venous distention [No Lymphadenopathy] : no lymphadenopathy [Supple] : supple [Thyroid Normal, No Nodules] : the thyroid was normal and there were no nodules present [No Respiratory Distress] : no respiratory distress  [No Accessory Muscle Use] : no accessory muscle use [Clear to Auscultation] : lungs were clear to auscultation bilaterally [Normal Rate] : normal rate  [Regular Rhythm] : with a regular rhythm [Normal S1, S2] : normal S1 and S2 [No Murmur] : no murmur heard [No Carotid Bruits] : no carotid bruits [No Abdominal Bruit] : a ~M bruit was not heard ~T in the abdomen [No Varicosities] : no varicosities [Pedal Pulses Present] : the pedal pulses are present [No Edema] : there was no peripheral edema [No Palpable Aorta] : no palpable aorta [No Extremity Clubbing/Cyanosis] : no extremity clubbing/cyanosis [Normal Appearance] : normal in appearance [No Nipple Discharge] : no nipple discharge [Soft] : abdomen soft [Non Tender] : non-tender [Non-distended] : non-distended [No Masses] : no abdominal mass palpated [No HSM] : no HSM [Normal Bowel Sounds] : normal bowel sounds [Normal Posterior Cervical Nodes] : no posterior cervical lymphadenopathy [Normal Anterior Cervical Nodes] : no anterior cervical lymphadenopathy [No CVA Tenderness] : no CVA  tenderness [No Spinal Tenderness] : no spinal tenderness [No Joint Swelling] : no joint swelling [Grossly Normal Strength/Tone] : grossly normal strength/tone [No Rash] : no rash [Coordination Grossly Intact] : coordination grossly intact [No Focal Deficits] : no focal deficits [Normal Gait] : normal gait [Deep Tendon Reflexes (DTR)] : deep tendon reflexes were 2+ and symmetric [Normal Affect] : the affect was normal [Normal Insight/Judgement] : insight and judgment were intact [de-identified] : No surrounding erythema, no active bleeding or pustular discharge from surgical incisions. 2 dissolvable sutures were sticking out from the incision line, one lovated in central area of left breast and the other was in the lateral portion of right breast

## 2023-05-22 NOTE — HEALTH RISK ASSESSMENT
[0] : 2) Feeling down, depressed, or hopeless: Not at all (0) [PHQ-2 Negative - No further assessment needed] : PHQ-2 Negative - No further assessment needed [[id number]] : 0

## 2023-05-22 NOTE — PLAN
[FreeTextEntry1] : HTN-  patient's BP well controlled with current medication. will continue current  regimen \par \par Post surgical inspection,No surrounding erythema, no active bleeding or pustular discharge from surgical incisions. \par 2 absorbable sutures were cut down to the level of the skin. \par Patient advised to keep with incision site clean and dry. \par Patient advised to follow up with the surgeon \par Patient advised to go to hospital if any signs or symptoms of infection are noted. \par \par Prior to appointment and during encounter with patient extensive medical records were reviewed including but not limited to, Hospital records records, out patient records, laboratory data and microbiology data \par In addition extensive time was also spent in reviewing diagnostic studies.\par \par Total encounter total time 20 mins\par >50% of time spent counseling/coordinating care\par \par \par Counseling included abnormal lab results, differential diagnoses, treatment options, risks and benefits, lifestyle changes, current condition, medications, and dose adjustments. \par The patient was interactive, attentive, asked questions, and verbalized understanding

## 2023-05-22 NOTE — HISTORY OF PRESENT ILLNESS
[FreeTextEntry1] : follow up after surgery  [de-identified] : Ms. GLENDY WALKER is a 48 year female with a PMH of HTN comes to the office for follow up  after bilateral breast lift on 05/02/23. Patient is noticing that some of the absorbable sutures were still present on the incision line.  Patient denies active bleeding today Patient denies fever, cough SOB. No other complaints at this time.

## 2023-06-05 ENCOUNTER — APPOINTMENT (OUTPATIENT)
Dept: INTERNAL MEDICINE | Facility: CLINIC | Age: 48
End: 2023-06-05
Payer: COMMERCIAL

## 2023-06-05 VITALS
HEART RATE: 75 BPM | DIASTOLIC BLOOD PRESSURE: 78 MMHG | SYSTOLIC BLOOD PRESSURE: 119 MMHG | HEIGHT: 69 IN | WEIGHT: 214 LBS | BODY MASS INDEX: 31.7 KG/M2

## 2023-06-05 DIAGNOSIS — E66.9 OBESITY, UNSPECIFIED: ICD-10-CM

## 2023-06-05 DIAGNOSIS — T81.30XA DISRUPTION OF WOUND, UNSPECIFIED, INITIAL ENCOUNTER: ICD-10-CM

## 2023-06-05 PROCEDURE — 99214 OFFICE O/P EST MOD 30 MIN: CPT

## 2023-06-05 RX ORDER — METOPROLOL SUCCINATE 25 MG/1
25 TABLET, EXTENDED RELEASE ORAL DAILY
Refills: 3 | Status: COMPLETED | COMMUNITY
Start: 2022-07-20 | End: 2023-06-05

## 2023-06-05 NOTE — HISTORY OF PRESENT ILLNESS
[FreeTextEntry1] : follow up after surgery  [de-identified] : Ms. GLENDY WALKER is a 48 year female with a PMH of HTN comes to the office for follow up  after bilateral breast lift on 05/02/23. Patient is noticing that the incision was opening up under her left breast.   Patient denies active bleeding today Patient denies fever, cough SOB. No other complaints at this time.

## 2023-06-05 NOTE — PHYSICAL EXAM
[No Acute Distress] : no acute distress [Well Nourished] : well nourished [Well Developed] : well developed [Well-Appearing] : well-appearing [Normal Sclera/Conjunctiva] : normal sclera/conjunctiva [PERRL] : pupils equal round and reactive to light [EOMI] : extraocular movements intact [Normal Outer Ear/Nose] : the outer ears and nose were normal in appearance [Normal Oropharynx] : the oropharynx was normal [No JVD] : no jugular venous distention [No Lymphadenopathy] : no lymphadenopathy [Supple] : supple [Thyroid Normal, No Nodules] : the thyroid was normal and there were no nodules present [No Respiratory Distress] : no respiratory distress  [No Accessory Muscle Use] : no accessory muscle use [Clear to Auscultation] : lungs were clear to auscultation bilaterally [Normal Rate] : normal rate  [Regular Rhythm] : with a regular rhythm [Normal S1, S2] : normal S1 and S2 [No Murmur] : no murmur heard [No Carotid Bruits] : no carotid bruits [No Abdominal Bruit] : a ~M bruit was not heard ~T in the abdomen [No Varicosities] : no varicosities [Pedal Pulses Present] : the pedal pulses are present [No Edema] : there was no peripheral edema [No Palpable Aorta] : no palpable aorta [No Extremity Clubbing/Cyanosis] : no extremity clubbing/cyanosis [Normal Appearance] : normal in appearance [No Nipple Discharge] : no nipple discharge [Soft] : abdomen soft [Non Tender] : non-tender [Non-distended] : non-distended [No Masses] : no abdominal mass palpated [No HSM] : no HSM [Normal Bowel Sounds] : normal bowel sounds [Normal Posterior Cervical Nodes] : no posterior cervical lymphadenopathy [Normal Anterior Cervical Nodes] : no anterior cervical lymphadenopathy [No CVA Tenderness] : no CVA  tenderness [No Spinal Tenderness] : no spinal tenderness [No Joint Swelling] : no joint swelling [Grossly Normal Strength/Tone] : grossly normal strength/tone [No Rash] : no rash [Coordination Grossly Intact] : coordination grossly intact [No Focal Deficits] : no focal deficits [Normal Gait] : normal gait [Deep Tendon Reflexes (DTR)] : deep tendon reflexes were 2+ and symmetric [Normal Affect] : the affect was normal [Normal Insight/Judgement] : insight and judgment were intact [de-identified] : No surrounding erythema, no active bleeding or pustular discharge from surgical incisions.  left breast inscision line from inferior aspect of left nipple to bottom of breast, some wound dehiscence  exam performed to chaperone FLOIRN Navarro present

## 2023-06-05 NOTE — PLAN
[FreeTextEntry1] : HTN-  patient's BP well controlled with current medication. will continue current  regimen \par \par Post surgical inspection,No surrounding erythema, no active bleeding or pustular discharge from surgical incisions. \par 3 steri strips were applied to opening incision to re-approximate, and stop wound from spreading wider\par \par Patient was advised to see the plastic surgeon that performed this surgery, however, she states that the surgeon is too far away. \par \par Patient advised to keep with incision site clean and dry. \par Patient advised to follow up with the surgeon \par Patient advised to go to hospital if any signs or symptoms of infection are noted. \par \par Prior to appointment and during encounter with patient extensive medical records were reviewed including but not limited to, Hospital records records, out patient records, laboratory data and microbiology data \par In addition extensive time was also spent in reviewing diagnostic studies.\par \par Total encounter total time 30 mins\par >50% of time spent counseling/coordinating care\par \par \par Counseling included abnormal lab results, differential diagnoses, treatment options, risks and benefits, lifestyle changes, current condition, medications, and dose adjustments. \par The patient was interactive, attentive, asked questions, and verbalized understanding

## 2023-06-06 LAB
ALBUMIN SERPL ELPH-MCNC: 4.7 G/DL
ALP BLD-CCNC: 78 U/L
ALT SERPL-CCNC: 21 U/L
ANION GAP SERPL CALC-SCNC: 13 MMOL/L
AST SERPL-CCNC: 18 U/L
BILIRUB SERPL-MCNC: 0.3 MG/DL
BUN SERPL-MCNC: 8 MG/DL
CALCIUM SERPL-MCNC: 9.5 MG/DL
CHLORIDE SERPL-SCNC: 106 MMOL/L
CO2 SERPL-SCNC: 22 MMOL/L
CREAT SERPL-MCNC: 0.68 MG/DL
EGFR: 107 ML/MIN/1.73M2
GLUCOSE SERPL-MCNC: 104 MG/DL
POTASSIUM SERPL-SCNC: 4 MMOL/L
PROT SERPL-MCNC: 7.5 G/DL
SODIUM SERPL-SCNC: 141 MMOL/L

## 2023-08-23 ENCOUNTER — LABORATORY RESULT (OUTPATIENT)
Age: 48
End: 2023-08-23

## 2023-08-23 ENCOUNTER — APPOINTMENT (OUTPATIENT)
Dept: INTERNAL MEDICINE | Facility: CLINIC | Age: 48
End: 2023-08-23
Payer: COMMERCIAL

## 2023-08-23 VITALS
BODY MASS INDEX: 31.7 KG/M2 | HEIGHT: 69 IN | DIASTOLIC BLOOD PRESSURE: 70 MMHG | WEIGHT: 214 LBS | SYSTOLIC BLOOD PRESSURE: 112 MMHG
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
## 2023-08-23 PROCEDURE — 36415 COLL VENOUS BLD VENIPUNCTURE: CPT

## 2023-08-23 PROCEDURE — 99214 OFFICE O/P EST MOD 30 MIN: CPT | Mod: 25

## 2023-08-23 NOTE — PLAN
[FreeTextEntry1] : Bug bite- will obtain blood work and start patient on antibiotics.   HTN-  patient's BP well controlled with current medication. will continue current  regimen   Prior to appointment and during encounter with patient extensive medical records were reviewed including but not limited to, Hospital records records, out patient records, laboratory data and microbiology data  In addition extensive time was also spent in reviewing diagnostic studies.  Total encounter total time 30 mins >50% of time spent counseling/coordinating care   Counseling included abnormal lab results, differential diagnoses, treatment options, risks and benefits, lifestyle changes, current condition, medications, and dose adjustments.  The patient was interactive, attentive, asked questions, and verbalized understanding

## 2023-08-23 NOTE — HEALTH RISK ASSESSMENT
[0] : 2) Feeling down, depressed, or hopeless: Not at all (0) [PHQ-2 Negative - No further assessment needed] : PHQ-2 Negative - No further assessment needed [Never] : Never [[id number]] : 0

## 2023-08-23 NOTE — PHYSICAL EXAM
[No Acute Distress] : no acute distress [Well Nourished] : well nourished [Well Developed] : well developed [Well-Appearing] : well-appearing [Normal Sclera/Conjunctiva] : normal sclera/conjunctiva [PERRL] : pupils equal round and reactive to light [EOMI] : extraocular movements intact [Normal Outer Ear/Nose] : the outer ears and nose were normal in appearance [Normal Oropharynx] : the oropharynx was normal [No JVD] : no jugular venous distention [No Lymphadenopathy] : no lymphadenopathy [Supple] : supple [No Respiratory Distress] : no respiratory distress  [No Accessory Muscle Use] : no accessory muscle use [Clear to Auscultation] : lungs were clear to auscultation bilaterally [Normal Rate] : normal rate  [Regular Rhythm] : with a regular rhythm [Normal S1, S2] : normal S1 and S2 [No Carotid Bruits] : no carotid bruits [No Abdominal Bruit] : a ~M bruit was not heard ~T in the abdomen [No Varicosities] : no varicosities [No Edema] : there was no peripheral edema [No Palpable Aorta] : no palpable aorta [No Extremity Clubbing/Cyanosis] : no extremity clubbing/cyanosis [Soft] : abdomen soft [Non Tender] : non-tender [Non-distended] : non-distended [No HSM] : no HSM [Normal Bowel Sounds] : normal bowel sounds [Normal Posterior Cervical Nodes] : no posterior cervical lymphadenopathy [Normal Anterior Cervical Nodes] : no anterior cervical lymphadenopathy [No CVA Tenderness] : no CVA  tenderness [No Spinal Tenderness] : no spinal tenderness [No Joint Swelling] : no joint swelling [Grossly Normal Strength/Tone] : grossly normal strength/tone [Coordination Grossly Intact] : coordination grossly intact [No Focal Deficits] : no focal deficits [Normal Gait] : normal gait [Normal Affect] : the affect was normal [Normal Insight/Judgement] : insight and judgment were intact [de-identified] : 1 cm erythema left leg. no pustular discharge or active bleeding.

## 2023-08-23 NOTE — HISTORY OF PRESENT ILLNESS
[FreeTextEntry1] : Bug bite [de-identified] : Ms. GLENDY WALKER is a 48 year female with a PMH of HTN comes to the office after she was bit by a bug on her leg 6 days ago while carpeting her house. Patient denies fever, cough SOB. No other complaints at this time.

## 2023-08-24 LAB
ANION GAP SERPL CALC-SCNC: 13 MMOL/L
BUN SERPL-MCNC: 11 MG/DL
CALCIUM SERPL-MCNC: 10 MG/DL
CHLORIDE SERPL-SCNC: 105 MMOL/L
CO2 SERPL-SCNC: 24 MMOL/L
CREAT SERPL-MCNC: 0.67 MG/DL
EGFR: 107 ML/MIN/1.73M2
GLUCOSE SERPL-MCNC: 56 MG/DL
POTASSIUM SERPL-SCNC: 4.5 MMOL/L
SODIUM SERPL-SCNC: 142 MMOL/L

## 2023-08-28 LAB
A PHAGOCYTOPH IGG TITR SER IF: NORMAL TITER
B BURGDOR AB SER QL IA: POSITIVE
B MICROTI IGG TITR SER: NORMAL TITER
E CHAFFEENSIS IGG TITR SER IF: NORMAL TITER

## 2023-08-29 ENCOUNTER — TRANSCRIPTION ENCOUNTER (OUTPATIENT)
Age: 48
End: 2023-08-29

## 2023-08-30 ENCOUNTER — TRANSCRIPTION ENCOUNTER (OUTPATIENT)
Age: 48
End: 2023-08-30

## 2023-09-20 NOTE — DISCHARGE NOTE NURSING/CASE MANAGEMENT/SOCIAL WORK - NSCORESITESY/N_GEN_A_CORE_RD
Pre-Surgery Instructions:   Medication Instructions   • amLODIPine (NORVASC) 10 mg tablet Take day of surgery. • ergocalciferol (VITAMIN D2) 50,000 units Hold day of surgery. • lisinopril (ZESTRIL) 10 mg tablet Hold day of surgery. Medication instructions for day surgery reviewed. Please use only a sip of water to take your instructed medications. Avoid all over the counter vitamins, supplements and NSAIDS for one week prior to surgery per anesthesia guidelines. Tylenol is ok to take as needed. You will receive a call one business day prior to surgery with an arrival time and hospital directions. If your surgery is scheduled on a Monday, the hospital will be calling you on the Friday prior to your surgery. If you have not heard from anyone by 8pm, please call the hospital supervisor through the hospital  at [phone]. Anah[facility] Every [phone]). Do not eat or drink anything after midnight the night before your surgery, including candy, mints, lifesavers, or chewing gum. Do not drink alcohol 24hrs before your surgery. Try not to smoke at least 24hrs before your surgery. Follow the pre surgery showering instructions as listed in the [facility] Surgical Experience Booklet” or otherwise provided by your surgeon's office. Do not shave the surgical area 24 hours before surgery. Do not apply any lotions, creams, including makeup, cologne, deodorant, or perfumes after showering on the day of your surgery. No contact lenses, eye make-up, or artificial eyelashes. Remove nail polish, including gel polish, and any artificial, gel, or acrylic nails if possible. Remove all jewelry including rings and body piercing jewelry. Wear causal clothing that is easy to take on and off. Consider your type of surgery. Keep any valuables, jewelry, piercings at home. Please bring any specially ordered equipment (sling, braces) if indicated.     Arrange for a responsible person to drive you to and from the No hospital on the day of your surgery. Visitor Guidelines discussed. Call the surgeon's office with any new illnesses, exposures, or additional questions prior to surgery. Please reference your [facility] Surgical Experience Booklet” for additional information to prepare for your upcoming surgery.

## 2023-10-24 ENCOUNTER — APPOINTMENT (OUTPATIENT)
Dept: ELECTROPHYSIOLOGY | Facility: CLINIC | Age: 48
End: 2023-10-24
Payer: COMMERCIAL

## 2023-10-24 ENCOUNTER — NON-APPOINTMENT (OUTPATIENT)
Age: 48
End: 2023-10-24

## 2023-10-24 VITALS
OXYGEN SATURATION: 100 % | SYSTOLIC BLOOD PRESSURE: 138 MMHG | WEIGHT: 217 LBS | DIASTOLIC BLOOD PRESSURE: 90 MMHG | HEIGHT: 69 IN | BODY MASS INDEX: 32.14 KG/M2 | HEART RATE: 89 BPM

## 2023-10-24 DIAGNOSIS — I48.0 PAROXYSMAL ATRIAL FIBRILLATION: ICD-10-CM

## 2023-10-24 DIAGNOSIS — I48.91 UNSPECIFIED ATRIAL FIBRILLATION: ICD-10-CM

## 2023-10-24 DIAGNOSIS — G47.33 OBSTRUCTIVE SLEEP APNEA (ADULT) (PEDIATRIC): ICD-10-CM

## 2023-10-24 PROCEDURE — 99214 OFFICE O/P EST MOD 30 MIN: CPT

## 2023-10-24 RX ORDER — AMOXICILLIN AND CLAVULANATE POTASSIUM 875; 125 MG/1; MG/1
875-125 TABLET, COATED ORAL TWICE DAILY
Qty: 14 | Refills: 0 | Status: DISCONTINUED | COMMUNITY
Start: 2023-08-23 | End: 2023-10-24

## 2023-10-24 RX ORDER — AMOXICILLIN AND CLAVULANATE POTASSIUM 875; 125 MG/1; MG/1
875-125 TABLET, COATED ORAL TWICE DAILY
Qty: 20 | Refills: 0 | Status: DISCONTINUED | COMMUNITY
Start: 2023-06-05 | End: 2023-10-24

## 2023-12-05 ENCOUNTER — APPOINTMENT (OUTPATIENT)
Dept: INTERNAL MEDICINE | Facility: CLINIC | Age: 48
End: 2023-12-05
Payer: COMMERCIAL

## 2023-12-05 VITALS
HEIGHT: 69 IN | BODY MASS INDEX: 32.14 KG/M2 | SYSTOLIC BLOOD PRESSURE: 122 MMHG | DIASTOLIC BLOOD PRESSURE: 80 MMHG | WEIGHT: 217 LBS | HEART RATE: 83 BPM | RESPIRATION RATE: 16 BRPM

## 2023-12-05 DIAGNOSIS — R79.9 ABNORMAL FINDING OF BLOOD CHEMISTRY, UNSPECIFIED: ICD-10-CM

## 2023-12-05 DIAGNOSIS — I10 ESSENTIAL (PRIMARY) HYPERTENSION: ICD-10-CM

## 2023-12-05 DIAGNOSIS — R53.83 OTHER FATIGUE: ICD-10-CM

## 2023-12-05 DIAGNOSIS — R51.9 HEADACHE, UNSPECIFIED: ICD-10-CM

## 2023-12-05 DIAGNOSIS — Z87.898 PERSONAL HISTORY OF OTHER SPECIFIED CONDITIONS: ICD-10-CM

## 2023-12-05 PROCEDURE — 36415 COLL VENOUS BLD VENIPUNCTURE: CPT

## 2023-12-05 PROCEDURE — 99214 OFFICE O/P EST MOD 30 MIN: CPT | Mod: 25

## 2023-12-06 LAB
ALBUMIN SERPL ELPH-MCNC: 4.5 G/DL
ALP BLD-CCNC: 61 U/L
ALT SERPL-CCNC: 30 U/L
ANION GAP SERPL CALC-SCNC: 11 MMOL/L
AST SERPL-CCNC: 30 U/L
BASOPHILS # BLD AUTO: 0.07 K/UL
BASOPHILS NFR BLD AUTO: 0.8 %
BILIRUB SERPL-MCNC: 0.4 MG/DL
BUN SERPL-MCNC: 15 MG/DL
CALCIUM SERPL-MCNC: 10 MG/DL
CHLORIDE SERPL-SCNC: 102 MMOL/L
CHOLEST SERPL-MCNC: 177 MG/DL
CO2 SERPL-SCNC: 22 MMOL/L
CREAT SERPL-MCNC: 0.64 MG/DL
EGFR: 108 ML/MIN/1.73M2
EOSINOPHIL # BLD AUTO: 0.3 K/UL
EOSINOPHIL NFR BLD AUTO: 3.5 %
ESTIMATED AVERAGE GLUCOSE: 85 MG/DL
FOLATE SERPL-MCNC: 19 NG/ML
GLUCOSE SERPL-MCNC: 61 MG/DL
HBA1C MFR BLD HPLC: 4.6 %
HCT VFR BLD CALC: 39.9 %
HDLC SERPL-MCNC: 57 MG/DL
HGB BLD-MCNC: 12.8 G/DL
IMM GRANULOCYTES NFR BLD AUTO: 0.4 %
LDLC SERPL CALC-MCNC: 98 MG/DL
LYMPHOCYTES # BLD AUTO: 2.02 K/UL
LYMPHOCYTES NFR BLD AUTO: 23.8 %
MAGNESIUM SERPL-MCNC: 2.3 MG/DL
MAN DIFF?: NORMAL
MCHC RBC-ENTMCNC: 32 PG
MCHC RBC-ENTMCNC: 32.1 GM/DL
MCV RBC AUTO: 99.8 FL
MONOCYTES # BLD AUTO: 0.64 K/UL
MONOCYTES NFR BLD AUTO: 7.6 %
NEUTROPHILS # BLD AUTO: 5.41 K/UL
NEUTROPHILS NFR BLD AUTO: 63.9 %
NONHDLC SERPL-MCNC: 121 MG/DL
PLATELET # BLD AUTO: 304 K/UL
POTASSIUM SERPL-SCNC: 5.2 MMOL/L
PROT SERPL-MCNC: 7.6 G/DL
RBC # BLD: 4 M/UL
RBC # FLD: 12.7 %
SODIUM SERPL-SCNC: 135 MMOL/L
TRIGL SERPL-MCNC: 127 MG/DL
TSH SERPL-ACNC: 1.55 UIU/ML
VIT B12 SERPL-MCNC: 549 PG/ML
WBC # FLD AUTO: 8.47 K/UL

## 2023-12-15 ENCOUNTER — APPOINTMENT (OUTPATIENT)
Dept: CT IMAGING | Facility: CLINIC | Age: 48
End: 2023-12-15

## 2024-01-16 ENCOUNTER — APPOINTMENT (OUTPATIENT)
Dept: INTERNAL MEDICINE | Facility: CLINIC | Age: 49
End: 2024-01-16
Payer: COMMERCIAL

## 2024-01-16 VITALS
HEIGHT: 69 IN | DIASTOLIC BLOOD PRESSURE: 70 MMHG | SYSTOLIC BLOOD PRESSURE: 134 MMHG | WEIGHT: 217 LBS | BODY MASS INDEX: 32.14 KG/M2 | HEART RATE: 70 BPM

## 2024-01-16 DIAGNOSIS — I10 ESSENTIAL (PRIMARY) HYPERTENSION: ICD-10-CM

## 2024-01-16 DIAGNOSIS — E78.5 HYPERLIPIDEMIA, UNSPECIFIED: ICD-10-CM

## 2024-01-16 DIAGNOSIS — E66.9 OBESITY, UNSPECIFIED: ICD-10-CM

## 2024-01-16 DIAGNOSIS — Z00.00 ENCOUNTER FOR GENERAL ADULT MEDICAL EXAMINATION W/OUT ABNORMAL FINDINGS: ICD-10-CM

## 2024-01-16 PROCEDURE — 99396 PREV VISIT EST AGE 40-64: CPT

## 2024-01-16 RX ORDER — MONTELUKAST 10 MG/1
10 TABLET, FILM COATED ORAL
Qty: 90 | Refills: 0 | Status: COMPLETED | COMMUNITY
Start: 2022-06-15 | End: 2024-01-16

## 2024-01-16 RX ORDER — MECLIZINE HYDROCHLORIDE 25 MG/1
25 TABLET ORAL
Qty: 90 | Refills: 0 | Status: COMPLETED | COMMUNITY
Start: 2023-12-05 | End: 2024-01-16

## 2024-01-16 NOTE — HISTORY OF PRESENT ILLNESS
[FreeTextEntry1] : physical exam  [de-identified] : Ms. GLENDY WALKER is a 48 year female with a PMH of HTN comes to the office for follow up of medical conditions and physical exam. Patient denies fever, cough SOB. No other complaints at this time.

## 2024-01-16 NOTE — PLAN
[FreeTextEntry1] : In regards to patients Physical exam, routine blood work drawn, will review results with patient.    form completed for patient at time of visit.   History of headache- CT head with IV contrast denies by insurance company. patient referred to Neurologist for further evaluation and management Patient advised to take OTC medication for symptomatic treatment of symptoms and advised to go to the hospital if symptoms become severe.   Counseling included abnormal lab results, differential diagnoses, treatment options, risks and benefits, lifestyle changes, current condition, medications, and dose adjustments.  The patient was interactive, attentive, asked questions, and verbalized understanding

## 2024-02-12 DIAGNOSIS — E34.8 OTHER SPECIFIED ENDOCRINE DISORDERS: ICD-10-CM

## 2024-02-20 ENCOUNTER — APPOINTMENT (OUTPATIENT)
Dept: OTOLARYNGOLOGY | Facility: CLINIC | Age: 49
End: 2024-02-20
Payer: COMMERCIAL

## 2024-02-20 VITALS
BODY MASS INDEX: 31.1 KG/M2 | SYSTOLIC BLOOD PRESSURE: 149 MMHG | WEIGHT: 210 LBS | DIASTOLIC BLOOD PRESSURE: 82 MMHG | HEART RATE: 92 BPM | HEIGHT: 69 IN

## 2024-02-20 PROCEDURE — 99204 OFFICE O/P NEW MOD 45 MIN: CPT | Mod: 25

## 2024-02-20 PROCEDURE — 31231 NASAL ENDOSCOPY DX: CPT

## 2024-02-20 NOTE — HISTORY OF PRESENT ILLNESS
[de-identified] : 49 year old female presents for evaluation of sinus disease Referred by Dr. Medhat Chapa MRI brain 1/30/24 showing incidental finding of sinus disease-- had been ordered for HA Reports frequent nasal congestion, anterior rhinorrhea, PND with frequent cough, sinus pain and pressure HA localized to frontoethmoid area Present for 1yr Sense of smell is good  Does not get recurrent sinus infections Uses zyrtec D & singulair at night - doesnt help as much as when she originally started these meds. Has been taking them for about 2 years Not on INCS

## 2024-02-20 NOTE — CONSULT LETTER
[Dear  ___] : Dear  [unfilled], [Consult Letter:] : I had the pleasure of evaluating your patient, [unfilled]. [Please see my note below.] : Please see my note below. [Consult Closing:] : Thank you very much for allowing me to participate in the care of this patient.  If you have any questions, please do not hesitate to contact me. [Sincerely,] : Sincerely, [FreeTextEntry2] : Medhat Chapa [FreeTextEntry3] : Selwyn Ruiz MD Sinus & Endoscopic Skull Base Surgery Department of Otolaryngology- Head & Neck Surgery [facility]  Phone: [phone] Fax: [phone]

## 2024-02-28 ENCOUNTER — APPOINTMENT (OUTPATIENT)
Dept: OTOLARYNGOLOGY | Facility: CLINIC | Age: 49
End: 2024-02-28
Payer: SELF-PAY

## 2024-02-28 DIAGNOSIS — L98.8 OTHER SPECIFIED DISORDERS OF THE SKIN AND SUBCUTANEOUS TISSUE: ICD-10-CM

## 2024-02-28 PROCEDURE — EN003: CPT

## 2024-02-28 RX ORDER — TRETINOIN 0.5 MG/G
0.05 CREAM TOPICAL
Qty: 1 | Refills: 0 | Status: ACTIVE | COMMUNITY
Start: 2024-02-28 | End: 1900-01-01

## 2024-03-06 NOTE — PROCEDURE
[FreeTextEntry3] : Treatment Area : upper and lower lip hair removal Consent taken and signed. Skin prepped and sterilized with alcohol wipe.  Test spot on neck waited 5 mins:  no erythema, no edema, pt tolerated well.  Spot size: 10 Fluence: 17.0 Pulse duration 8.0 Frequency 1.0 Pt tolerated treatment well, skin asses no erthema or edema to treatment area.  No extreme heat, or increase body temperature .ie working out, sweating, sun exposure for 24 hours. pt can return in 4-6 weeks for retreatment. Reccomend a series of 6-8 treatments for optimal reduction of hair.  if swelling blistering peeling of skin occur pt is to contact office

## 2024-04-05 ENCOUNTER — APPOINTMENT (OUTPATIENT)
Dept: OTOLARYNGOLOGY | Facility: CLINIC | Age: 49
End: 2024-04-05
Payer: SELF-PAY

## 2024-04-05 PROCEDURE — EN003: CPT

## 2024-04-05 RX ORDER — TRETINOIN 0.5 MG/G
0.05 CREAM TOPICAL
Qty: 1 | Refills: 0 | Status: ACTIVE | COMMUNITY
Start: 2024-03-05 | End: 1900-01-01

## 2024-04-06 NOTE — PHYSICAL EXAM
[de-identified] : Pt presents for subsequent  Laser hair treatment , pt reported no swelling bruising or changes to skin and tolerated procedure weel. test spot completed. see procedure note for  settings

## 2024-05-07 ENCOUNTER — APPOINTMENT (OUTPATIENT)
Dept: OTOLARYNGOLOGY | Facility: CLINIC | Age: 49
End: 2024-05-07
Payer: COMMERCIAL

## 2024-05-07 DIAGNOSIS — R09.89 OTHER SPECIFIED SYMPTOMS AND SIGNS INVOLVING THE CIRCULATORY AND RESPIRATORY SYSTEMS: ICD-10-CM

## 2024-05-07 DIAGNOSIS — R49.0 DYSPHONIA: ICD-10-CM

## 2024-05-07 PROCEDURE — 31231 NASAL ENDOSCOPY DX: CPT

## 2024-05-07 PROCEDURE — 99213 OFFICE O/P EST LOW 20 MIN: CPT | Mod: 25

## 2024-05-07 RX ORDER — FLUTICASONE PROPIONATE 50 UG/1
50 SPRAY, METERED NASAL
Qty: 1 | Refills: 7 | Status: ACTIVE | COMMUNITY
Start: 2024-02-20 | End: 1900-01-01

## 2024-05-07 NOTE — HISTORY OF PRESENT ILLNESS
[de-identified] : 49 year old female hx CRS presents for follow up V 2/20/24 States using Flonase 2x a day with improvement No recent nasal congestion, anterior rhinorrhea, PND, facial pain/pressure Denies epistaxis, recent fevers or sinus infections Sense of smell is good

## 2024-05-15 ENCOUNTER — APPOINTMENT (OUTPATIENT)
Dept: OTOLARYNGOLOGY | Facility: CLINIC | Age: 49
End: 2024-05-15
Payer: SELF-PAY

## 2024-05-15 PROCEDURE — EN003: CPT

## 2024-06-19 ENCOUNTER — APPOINTMENT (OUTPATIENT)
Dept: OTOLARYNGOLOGY | Facility: CLINIC | Age: 49
End: 2024-06-19
Payer: SELF-PAY

## 2024-06-19 PROCEDURE — EN003: CPT

## 2024-06-20 DIAGNOSIS — L08.9 LOCAL INFECTION OF THE SKIN AND SUBCUTANEOUS TISSUE, UNSPECIFIED: ICD-10-CM

## 2024-06-20 RX ORDER — METHYLPREDNISOLONE 4 MG/1
4 TABLET ORAL
Qty: 1 | Refills: 1 | Status: ACTIVE | COMMUNITY
Start: 2024-06-20 | End: 1900-01-01

## 2024-06-20 RX ORDER — HYDROCORTISONE 25 MG/G
2.5 CREAM TOPICAL 3 TIMES DAILY
Qty: 1 | Refills: 0 | Status: ACTIVE | COMMUNITY
Start: 2024-06-20 | End: 1900-01-01

## 2024-06-21 ENCOUNTER — APPOINTMENT (OUTPATIENT)
Dept: OTOLARYNGOLOGY | Facility: CLINIC | Age: 49
End: 2024-06-21

## 2024-06-21 PROCEDURE — 99212 OFFICE O/P EST SF 10 MIN: CPT

## 2024-06-21 NOTE — PHYSICAL EXAM
[de-identified] : Treatment Area :  Consent taken and signed. Skin prepped and sterilized with alcohol wipe.  Test spot on neck waited 5 mins:  no erythema, no edema, pt tolerated well.  Spot size: 10 Fluence: 17.0 Pulse duration 8.0 Frequency 1.0 Pt tolerated treatment well, skin asses no erthema or edema to treatment area. two passes one for coarse and one with fine setting  No extreme heat, or increase body temperature .ie working out, sweating, sun exposure for 24 hours. P t can return in 4-6 weeks for retreatment. Recommend a series of 6-8 treatments for optimal reduction of hair.  If swelling,blistering,peeling of skin occur pt is to contact office.

## 2024-06-24 RX ORDER — GABAPENTIN 100 MG/1
100 CAPSULE ORAL 3 TIMES DAILY
Qty: 30 | Refills: 0 | Status: ACTIVE | COMMUNITY
Start: 2024-06-24 | End: 1900-01-01

## 2024-06-25 ENCOUNTER — RX RENEWAL (OUTPATIENT)
Age: 49
End: 2024-06-25

## 2024-06-25 RX ORDER — SPIRONOLACTONE 25 MG/1
25 TABLET ORAL DAILY
Qty: 90 | Refills: 3 | Status: ACTIVE | COMMUNITY
Start: 1900-01-01 | End: 1900-01-01

## 2024-06-25 RX ORDER — AMLODIPINE BESYLATE 10 MG/1
10 TABLET ORAL
Qty: 90 | Refills: 3 | Status: ACTIVE | COMMUNITY
Start: 2022-07-08 | End: 1900-01-01

## 2024-06-26 ENCOUNTER — APPOINTMENT (OUTPATIENT)
Dept: OTOLARYNGOLOGY | Facility: CLINIC | Age: 49
End: 2024-06-26

## 2024-06-26 PROCEDURE — 99242 OFF/OP CONSLTJ NEW/EST SF 20: CPT | Mod: NC

## 2024-07-24 ENCOUNTER — APPOINTMENT (OUTPATIENT)
Dept: OTOLARYNGOLOGY | Facility: CLINIC | Age: 49
End: 2024-07-24

## 2024-07-24 PROCEDURE — 99242 OFF/OP CONSLTJ NEW/EST SF 20: CPT | Mod: NC

## 2024-08-21 ENCOUNTER — APPOINTMENT (OUTPATIENT)
Dept: OTOLARYNGOLOGY | Facility: CLINIC | Age: 49
End: 2024-08-21

## 2024-10-22 ENCOUNTER — APPOINTMENT (OUTPATIENT)
Dept: ELECTROPHYSIOLOGY | Facility: CLINIC | Age: 49
End: 2024-10-22

## 2025-01-17 ENCOUNTER — APPOINTMENT (OUTPATIENT)
Dept: INTERNAL MEDICINE | Facility: CLINIC | Age: 50
End: 2025-01-17
Payer: COMMERCIAL

## 2025-01-17 VITALS
HEART RATE: 87 BPM | SYSTOLIC BLOOD PRESSURE: 130 MMHG | HEIGHT: 69 IN | DIASTOLIC BLOOD PRESSURE: 85 MMHG | WEIGHT: 210 LBS | BODY MASS INDEX: 31.1 KG/M2

## 2025-01-17 DIAGNOSIS — R53.83 OTHER FATIGUE: ICD-10-CM

## 2025-01-17 DIAGNOSIS — I10 ESSENTIAL (PRIMARY) HYPERTENSION: ICD-10-CM

## 2025-01-17 DIAGNOSIS — E66.9 OBESITY, UNSPECIFIED: ICD-10-CM

## 2025-01-17 DIAGNOSIS — R79.9 ABNORMAL FINDING OF BLOOD CHEMISTRY, UNSPECIFIED: ICD-10-CM

## 2025-01-17 DIAGNOSIS — E78.5 HYPERLIPIDEMIA, UNSPECIFIED: ICD-10-CM

## 2025-01-17 DIAGNOSIS — Z00.00 ENCOUNTER FOR GENERAL ADULT MEDICAL EXAMINATION W/OUT ABNORMAL FINDINGS: ICD-10-CM

## 2025-01-17 PROCEDURE — 36415 COLL VENOUS BLD VENIPUNCTURE: CPT

## 2025-01-17 PROCEDURE — 99396 PREV VISIT EST AGE 40-64: CPT

## 2025-01-20 LAB
ALBUMIN SERPL ELPH-MCNC: 4.4 G/DL
ALP BLD-CCNC: 74 U/L
ALT SERPL-CCNC: 16 U/L
ANION GAP SERPL CALC-SCNC: 11 MMOL/L
AST SERPL-CCNC: 17 U/L
BASOPHILS # BLD AUTO: 0.06 K/UL
BASOPHILS NFR BLD AUTO: 0.9 %
BILIRUB SERPL-MCNC: 0.4 MG/DL
BUN SERPL-MCNC: 8 MG/DL
CALCIUM SERPL-MCNC: 9.6 MG/DL
CHLORIDE SERPL-SCNC: 102 MMOL/L
CHOLEST SERPL-MCNC: 196 MG/DL
CO2 SERPL-SCNC: 25 MMOL/L
CREAT SERPL-MCNC: 0.63 MG/DL
EGFR: 108 ML/MIN/1.73M2
EOSINOPHIL # BLD AUTO: 0.32 K/UL
EOSINOPHIL NFR BLD AUTO: 4.7 %
ESTIMATED AVERAGE GLUCOSE: 85 MG/DL
GLUCOSE SERPL-MCNC: 97 MG/DL
HBA1C MFR BLD HPLC: 4.6 %
HCT VFR BLD CALC: 41 %
HDLC SERPL-MCNC: 68 MG/DL
HGB BLD-MCNC: 12.6 G/DL
IMM GRANULOCYTES NFR BLD AUTO: 0.3 %
LDLC SERPL CALC-MCNC: 112 MG/DL
LYMPHOCYTES # BLD AUTO: 1.77 K/UL
LYMPHOCYTES NFR BLD AUTO: 25.8 %
MAN DIFF?: NORMAL
MCHC RBC-ENTMCNC: 30.7 G/DL
MCHC RBC-ENTMCNC: 31.7 PG
MCV RBC AUTO: 103 FL
MONOCYTES # BLD AUTO: 0.53 K/UL
MONOCYTES NFR BLD AUTO: 7.7 %
NEUTROPHILS # BLD AUTO: 4.15 K/UL
NEUTROPHILS NFR BLD AUTO: 60.6 %
NONHDLC SERPL-MCNC: 128 MG/DL
PLATELET # BLD AUTO: 287 K/UL
POTASSIUM SERPL-SCNC: 4.2 MMOL/L
PROT SERPL-MCNC: 7.2 G/DL
RBC # BLD: 3.98 M/UL
RBC # FLD: 13.3 %
SODIUM SERPL-SCNC: 139 MMOL/L
TRIGL SERPL-MCNC: 92 MG/DL
TSH SERPL-ACNC: 1.7 UIU/ML
WBC # FLD AUTO: 6.85 K/UL

## 2025-05-09 NOTE — HISTORY OF PRESENT ILLNESS
[FreeTextEntry1] : follow up  [de-identified] : Ms. GLENDY WALKER is a 46 year female with a PMH of HTN comes to the office for follow up of medical conditions and follow up of  infection of wound. patient had abdominoplasty and liposuction on 09/10/21 in [facility] [place] \par \par  No

## 2025-05-29 ENCOUNTER — APPOINTMENT (OUTPATIENT)
Dept: INTERNAL MEDICINE | Facility: CLINIC | Age: 50
End: 2025-05-29

## 2025-05-29 ENCOUNTER — NON-APPOINTMENT (OUTPATIENT)
Age: 50
End: 2025-05-29

## 2025-06-12 NOTE — HISTORY OF PRESENT ILLNESS
[FreeTextEntry1] : stomach issues / vomiting / diarrhea since coming back from [place]. [de-identified] : Ms. GLENDY WALKER is a 43 year female with a PMH of HTN and hypokalemia comes to the office c/o stomach issues / vomiting / diarrhea since coming back from [place] 3 weeks ago. Patient denies fever. 99.2